# Patient Record
Sex: FEMALE | Race: WHITE | Employment: UNEMPLOYED | URBAN - NONMETROPOLITAN AREA
[De-identification: names, ages, dates, MRNs, and addresses within clinical notes are randomized per-mention and may not be internally consistent; named-entity substitution may affect disease eponyms.]

---

## 2020-08-10 ENCOUNTER — HOSPITAL ENCOUNTER (EMERGENCY)
Age: 50
Discharge: HOME | End: 2020-08-10

## 2020-08-10 VITALS
WEIGHT: 190 LBS | DIASTOLIC BLOOD PRESSURE: 71 MMHG | HEIGHT: 63 IN | SYSTOLIC BLOOD PRESSURE: 108 MMHG | BODY MASS INDEX: 33.66 KG/M2 | OXYGEN SATURATION: 96 % | RESPIRATION RATE: 18 BRPM | HEART RATE: 90 BPM

## 2020-08-10 PROCEDURE — 99282 EMERGENCY DEPT VISIT SF MDM: CPT

## 2020-08-10 RX ORDER — NAPROXEN 500 MG/1
500 TABLET ORAL 2 TIMES DAILY
Qty: 14 TABLET | Refills: 0 | Status: SHIPPED | OUTPATIENT
Start: 2020-08-10 | End: 2020-08-18

## 2020-08-10 RX ORDER — PENICILLIN V POTASSIUM 500 MG/1
500 TABLET ORAL 4 TIMES DAILY
Qty: 40 TABLET | Refills: 0 | Status: SHIPPED | OUTPATIENT
Start: 2020-08-10 | End: 2020-08-20

## 2020-08-10 ASSESSMENT — PAIN DESCRIPTION - ORIENTATION: ORIENTATION: RIGHT;UPPER;LOWER

## 2020-08-10 ASSESSMENT — PAIN SCALES - GENERAL
PAINLEVEL_OUTOF10: 8
PAINLEVEL_OUTOF10: 8

## 2020-08-10 ASSESSMENT — PAIN DESCRIPTION - DESCRIPTORS: DESCRIPTORS: ACHING

## 2020-08-10 ASSESSMENT — PAIN DESCRIPTION - LOCATION: LOCATION: TEETH

## 2020-08-10 ASSESSMENT — PAIN DESCRIPTION - PAIN TYPE: TYPE: ACUTE PAIN

## 2020-08-10 ASSESSMENT — PAIN - FUNCTIONAL ASSESSMENT: PAIN_FUNCTIONAL_ASSESSMENT: 0-10

## 2020-08-10 NOTE — ED PROVIDER NOTES
[address] [facility] ED  EMERGENCY DEPARTMENT ENCOUNTER      Pt Name: Chrissy Ho  MRN: [id number]  [place] 1970  Date of evaluation: 8/10/2020  Provider: uYsuf Aguero PA-C    CHIEF COMPLAINT       Chief Complaint   Patient presents with    Dental Pain     Right side, onset 4-5 days ago. Pt is unsure of exact tooth, but states she has some facial swelling. Unable to make dental appt. HISTORY OF PRESENT ILLNESS    Chrissy Ho is a 54 y.o. female who presents to the emergency department from home dental pain for 4 to 5 days right-sided upper and lower palate. Unsure of which tooth that is. No inflammation or difficulty speaking or swallowing. She is called several dentist without the ability to make an appointment none are taking the new patients. Triage notes and Nursing notes were reviewed by myself. Any discrepancies are addressed above. PAST MEDICAL HISTORY   History reviewed. No pertinent past medical history. SURGICAL HISTORY       Past Surgical History:   Procedure Laterality Date    CHOLECYSTECTOMY         CURRENT MEDICATIONS       Previous Medications    No medications on file       ALLERGIES     Patient has no known allergies. FAMILY HISTORY     History reviewed. No pertinent family history.      SOCIAL HISTORY       Social History     Socioeconomic History    Marital status: None     Spouse name: None    Number of children: None    Years of education: None    Highest education level: None   Occupational History    None   Social Needs    Financial resource strain: None    Food insecurity     Worry: None     Inability: None    Transportation needs     Medical: None     Non-medical: None   Tobacco Use    Smoking status: Current Every Day Smoker     Packs/day: 1.00     Types: Cigarettes    Smokeless tobacco: Never Used   Substance and Sexual Activity    Alcohol use: None    Drug use: None    Sexual activity: None   Lifestyle    Physical activity     Days per week: None     Minutes per session: None    Stress: None   Relationships    Social connections     Talks on phone: None     Gets together: None     Attends Yarsani service: None     Active member of club or organization: None     Attends meetings of clubs or organizations: None     Relationship status: None    Intimate partner violence     Fear of current or ex partner: None     Emotionally abused: None     Physically abused: None     Forced sexual activity: None   Other Topics Concern    None   Social History Narrative    None       REVIEW OF SYSTEMS     Review of Systems  Except as noted above the remainder of the review of systems was reviewed and is negative. SCREENINGS           PHYSICAL EXAM    (up to 7 for level 4, 8 or more for level 5)     ED Triage Vitals [08/10/20 1118]   BP Temp Temp src Pulse Resp SpO2 Height Weight   -- -- -- 90 18 96 % 5' 3\" (1.6 m) 190 lb (86.2 kg)       Physical Exam  Active and oriented ×3. Nontoxic. No acute distress. Well-hydrated. Head is atraumatic, facies symmetrical.  Mucus membranes are moist.  Throat free of erythema edema or exudate uvula midline and airway patent. No trismus no elevation of the floor the mouth. Teeth appear and moderately good condition, there are several fillings that are in place but no obvious dental caries noted there is some receding of the gumline but no abscess noted. Respirations nonlabored. Skin free of any obvious rashes or lesions. Extremities without edema. Good affect. Pleasant patient.       DIAGNOSTIC RESULTS     none    EMERGENCY DEPARTMENT COURSE andMedical Decision Making:     Vitals:    Vitals:    08/10/20 1118   Pulse: 90   Resp: 18   SpO2: 96%   Weight: 190 lb (86.2 kg)   Height: 5' 3\" (1.6 m)       MDM/   Patient is placed on Pen-Vee K and naproxen given list of local dental clinics    Strict return precautions and follow up instructions were discussed with the patient with which the patient agrees    ED Medications administered this visit:  Medications - No data to display    CONSULTS: (None if blank)  None    Procedures: (None if blank)       CLINICAL       1.  Pain, dental          DISPOSITION/PLAN   DISPOSITION Decision To Discharge 08/10/2020 11:23:39 AM      PATIENT REFERRED TO:  Dentist  Clinic list provided          DISCHARGE MEDICATIONS:  New Prescriptions    NAPROXEN (NAPROSYN) 500 MG TABLET    Take 1 tablet by mouth 2 times daily    PENICILLIN V POTASSIUM (VEETID) 500 MG TABLET    Take 1 tablet by mouth 4 times daily for 10 days              (Please note that portions of this note were completed with a voice recognition program.  Efforts were made to edit the dictations but occasionallywords are mis-transcribed.)      Thomas Watts II, PA-C (electronically signed)           Thomas Watts II, PA-C  08/10/20 1126

## 2020-08-18 ENCOUNTER — HOSPITAL ENCOUNTER (EMERGENCY)
Age: 50
Discharge: HOME | End: 2020-08-18

## 2020-08-18 ENCOUNTER — APPOINTMENT (OUTPATIENT)
Dept: GENERAL RADIOLOGY | Age: 50
End: 2020-08-18

## 2020-08-18 VITALS
DIASTOLIC BLOOD PRESSURE: 70 MMHG | RESPIRATION RATE: 16 BRPM | SYSTOLIC BLOOD PRESSURE: 137 MMHG | OXYGEN SATURATION: 94 % | HEART RATE: 90 BPM | TEMPERATURE: 98.8 F

## 2020-08-18 LAB
ABSOLUTE EOS #: 0.23 K/UL (ref 0–0.44)
ABSOLUTE IMMATURE GRANULOCYTE: 0.05 K/UL (ref 0–0.3)
ABSOLUTE LYMPH #: 3.03 K/UL (ref 1.1–3.7)
ABSOLUTE MONO #: 0.76 K/UL (ref 0.1–1.2)
ANION GAP SERPL CALCULATED.3IONS-SCNC: 14 MMOL/L (ref 9–17)
BASOPHILS # BLD: 1 % (ref 0–2)
BASOPHILS ABSOLUTE: 0.09 K/UL (ref 0–0.2)
BNP INTERPRETATION: NORMAL
BUN BLDV-MCNC: 14 MG/DL (ref 6–20)
BUN/CREAT BLD: 23 (ref 9–20)
CALCIUM SERPL-MCNC: 9.6 MG/DL (ref 8.6–10.4)
CHLORIDE BLD-SCNC: 102 MMOL/L (ref 98–107)
CO2: 20 MMOL/L (ref 20–31)
CREAT SERPL-MCNC: 0.61 MG/DL (ref 0.5–0.9)
DIFFERENTIAL TYPE: ABNORMAL
EKG ATRIAL RATE: 93 BPM
EKG P AXIS: 49 DEGREES
EKG P-R INTERVAL: 114 MS
EKG Q-T INTERVAL: 362 MS
EKG QRS DURATION: 76 MS
EKG QTC CALCULATION (BAZETT): 450 MS
EKG R AXIS: 65 DEGREES
EKG T AXIS: 37 DEGREES
EKG VENTRICULAR RATE: 93 BPM
EOSINOPHILS RELATIVE PERCENT: 3 % (ref 1–4)
GFR AFRICAN AMERICAN: >60 ML/MIN
GFR NON-AFRICAN AMERICAN: >60 ML/MIN
GFR SERPL CREATININE-BSD FRML MDRD: ABNORMAL ML/MIN/{1.73_M2}
GFR SERPL CREATININE-BSD FRML MDRD: ABNORMAL ML/MIN/{1.73_M2}
GLUCOSE BLD-MCNC: 125 MG/DL (ref 70–99)
HCT VFR BLD CALC: 49.5 % (ref 36.3–47.1)
HEMOGLOBIN: 16.2 G/DL (ref 11.9–15.1)
IMMATURE GRANULOCYTES: 1 %
LYMPHOCYTES # BLD: 33 % (ref 24–43)
MCH RBC QN AUTO: 31 PG (ref 25.2–33.5)
MCHC RBC AUTO-ENTMCNC: 32.7 G/DL (ref 28.4–34.8)
MCV RBC AUTO: 94.6 FL (ref 82.6–102.9)
MONOCYTES # BLD: 8 % (ref 3–12)
NRBC AUTOMATED: 0 PER 100 WBC
PDW BLD-RTO: 14.8 % (ref 11.8–14.4)
PLATELET # BLD: 312 K/UL (ref 138–453)
PLATELET ESTIMATE: ABNORMAL
PMV BLD AUTO: 10.2 FL (ref 8.1–13.5)
POTASSIUM SERPL-SCNC: 3.6 MMOL/L (ref 3.7–5.3)
PRO-BNP: <20 PG/ML
RBC # BLD: 5.23 M/UL (ref 3.95–5.11)
RBC # BLD: ABNORMAL 10*6/UL
SEG NEUTROPHILS: 54 % (ref 36–65)
SEGMENTED NEUTROPHILS ABSOLUTE COUNT: 5.17 K/UL (ref 1.5–8.1)
SODIUM BLD-SCNC: 136 MMOL/L (ref 135–144)
TROPONIN INTERP: NORMAL
TROPONIN INTERP: NORMAL
TROPONIN T: NORMAL NG/ML
TROPONIN T: NORMAL NG/ML
TROPONIN, HIGH SENSITIVITY: <6 NG/L (ref 0–14)
TROPONIN, HIGH SENSITIVITY: <6 NG/L (ref 0–14)
WBC # BLD: 9.3 K/UL (ref 3.5–11.3)
WBC # BLD: ABNORMAL 10*3/UL

## 2020-08-18 PROCEDURE — 93005 ELECTROCARDIOGRAM TRACING: CPT | Performed by: EMERGENCY MEDICINE

## 2020-08-18 PROCEDURE — 71045 X-RAY EXAM CHEST 1 VIEW: CPT

## 2020-08-18 PROCEDURE — 94664 DEMO&/EVAL PT USE INHALER: CPT

## 2020-08-18 PROCEDURE — 83880 ASSAY OF NATRIURETIC PEPTIDE: CPT

## 2020-08-18 PROCEDURE — 6370000000 HC RX 637 (ALT 250 FOR IP)

## 2020-08-18 PROCEDURE — 85025 COMPLETE CBC W/AUTO DIFF WBC: CPT

## 2020-08-18 PROCEDURE — 84484 ASSAY OF TROPONIN QUANT: CPT

## 2020-08-18 PROCEDURE — 99285 EMERGENCY DEPT VISIT HI MDM: CPT

## 2020-08-18 PROCEDURE — 93010 ELECTROCARDIOGRAM REPORT: CPT | Performed by: FAMILY MEDICINE

## 2020-08-18 PROCEDURE — 6370000000 HC RX 637 (ALT 250 FOR IP): Performed by: PHYSICIAN ASSISTANT

## 2020-08-18 PROCEDURE — 80048 BASIC METABOLIC PNL TOTAL CA: CPT

## 2020-08-18 RX ORDER — IPRATROPIUM BROMIDE AND ALBUTEROL SULFATE 2.5; .5 MG/3ML; MG/3ML
1 SOLUTION RESPIRATORY (INHALATION)
Status: DISCONTINUED | OUTPATIENT
Start: 2020-08-18 | End: 2020-08-18 | Stop reason: HOSPADM

## 2020-08-18 RX ORDER — ALBUTEROL SULFATE 90 UG/1
2 AEROSOL, METERED RESPIRATORY (INHALATION) 4 TIMES DAILY PRN
Qty: 1 INHALER | Refills: 0 | Status: SHIPPED | OUTPATIENT
Start: 2020-08-18 | End: 2021-11-04 | Stop reason: ALTCHOICE

## 2020-08-18 RX ORDER — PREDNISONE 20 MG/1
40 TABLET ORAL ONCE
Status: COMPLETED | OUTPATIENT
Start: 2020-08-18 | End: 2020-08-18

## 2020-08-18 RX ORDER — IPRATROPIUM BROMIDE AND ALBUTEROL SULFATE 2.5; .5 MG/3ML; MG/3ML
SOLUTION RESPIRATORY (INHALATION)
Status: COMPLETED
Start: 2020-08-18 | End: 2020-08-18

## 2020-08-18 RX ORDER — PREDNISONE 20 MG/1
40 TABLET ORAL DAILY
Qty: 10 TABLET | Refills: 0 | Status: SHIPPED | OUTPATIENT
Start: 2020-08-18 | End: 2020-08-23

## 2020-08-18 RX ADMIN — IPRATROPIUM BROMIDE AND ALBUTEROL SULFATE 1 AMPULE: .5; 3 SOLUTION RESPIRATORY (INHALATION) at 17:42

## 2020-08-18 RX ADMIN — IPRATROPIUM BROMIDE AND ALBUTEROL SULFATE 1 AMPULE: 2.5; .5 SOLUTION RESPIRATORY (INHALATION) at 17:42

## 2020-08-18 RX ADMIN — PREDNISONE 40 MG: 20 TABLET ORAL at 19:48

## 2020-08-18 NOTE — ED NOTES
RT aware of breathing treatment order. Also at this time, green and lavender tubes redrawn. Patient denies further comfort needs at this time and is updated on plan of care.       Miguel Borrero RN  08/18/20 5020

## 2020-08-18 NOTE — ED NOTES
States nausea and decreased appetite for the last 3 days. Also LUQ pain. States chest pain that was worsened while on naproxen for tooth pain.   States still shortness of breath even at rest.     Javier Barraza RN  08/18/20 1426

## 2020-10-20 ENCOUNTER — HOSPITAL ENCOUNTER (EMERGENCY)
Age: 50
Discharge: HOME | End: 2020-10-20

## 2020-10-20 VITALS
SYSTOLIC BLOOD PRESSURE: 147 MMHG | RESPIRATION RATE: 16 BRPM | TEMPERATURE: 98.1 F | HEART RATE: 81 BPM | WEIGHT: 213 LBS | HEIGHT: 60 IN | OXYGEN SATURATION: 94 % | DIASTOLIC BLOOD PRESSURE: 95 MMHG | BODY MASS INDEX: 41.82 KG/M2

## 2020-10-20 PROCEDURE — U0003 INFECTIOUS AGENT DETECTION BY NUCLEIC ACID (DNA OR RNA); SEVERE ACUTE RESPIRATORY SYNDROME CORONAVIRUS 2 (SARS-COV-2) (CORONAVIRUS DISEASE [COVID-19]), AMPLIFIED PROBE TECHNIQUE, MAKING USE OF HIGH THROUGHPUT TECHNOLOGIES AS DESCRIBED BY CMS-2020-01-R: HCPCS

## 2020-10-20 PROCEDURE — 99283 EMERGENCY DEPT VISIT LOW MDM: CPT

## 2020-10-20 NOTE — ED PROVIDER NOTES
[facility] ED  EMERGENCY DEPARTMENT ENCOUNTER      Pt Name: Rich Lyons  MRN: [id number]  [place] 1970  Date of evaluation: 10/20/2020  Provider: REBEKAH Piña CNP    CHIEF COMPLAINT       Chief Complaint   Patient presents with    Covid Testing     poss exposure to Dave pt states co worker tested positive and work will not allow her to RTW until she tested negative. pt asymptomatic         HISTORY OF PRESENT ILLNESS   (Location/Symptom, Timing/Onset, Context/Setting, Quality, Duration, Modifying Factors, Severity)  Note limiting factors. Rich Lyons is a 54 y.o. female who presents to the emergency department for a COVID-19 test.  The patient reports that they were exposed to someone with COVID-19. The patient denies any symptoms. Nursing Notes were reviewed. REVIEW OF SYSTEMS    (2-9 systems for level 4, 10 or more for level 5)   Constitutional: Negative for fever, chills    Except as noted above the remainder of the review of systems was reviewed and negative. PAST MEDICAL HISTORY   History reviewed. No pertinent past medical history. SURGICAL HISTORY       Past Surgical History:   Procedure Laterality Date    CHOLECYSTECTOMY           CURRENT MEDICATIONS       Current Discharge Medication List      CONTINUE these medications which have NOT CHANGED    Details   albuterol sulfate HFA (VENTOLIN HFA) 108 (90 Base) MCG/ACT inhaler Inhale 2 puffs into the lungs 4 times daily as needed for Wheezing  Qty: 1 Inhaler, Refills: 0             ALLERGIES     Patient has no known allergies. FAMILY HISTORY     History reviewed. No pertinent family history.        SOCIAL HISTORY       Social History     Socioeconomic History    Marital status: Single     Spouse name: None    Number of children: None    Years of education: None    Highest education level: None   Occupational History    None   Social Needs    Financial resource strain: None    Food insecurity Worry: None     Inability: None    Transportation needs     Medical: None     Non-medical: None   Tobacco Use    Smoking status: Current Every Day Smoker     Packs/day: 1.00     Types: Cigarettes    Smokeless tobacco: Never Used   Substance and Sexual Activity    Alcohol use: Yes     Comment: occ    Drug use: Not Currently    Sexual activity: None   Lifestyle    Physical activity     Days per week: None     Minutes per session: None    Stress: None   Relationships    Social connections     Talks on phone: None     Gets together: None     Attends Jainism service: None     Active member of club or organization: None     Attends meetings of clubs or organizations: None     Relationship status: None    Intimate partner violence     Fear of current or ex partner: None     Emotionally abused: None     Physically abused: None     Forced sexual activity: None   Other Topics Concern    None   Social History Narrative    None       SCREENINGS                PHYSICAL EXAM    (up to 7 for level 4, 8 or more for level 5)     ED Triage Vitals [10/20/20 1616]   BP Temp Temp Source Pulse Resp SpO2 Height Weight   (!) 147/95 98.1 °F (36.7 °C) Oral 81 16 94 % 5' (1.524 m) 213 lb (96.6 kg)     Constitutional: Oriented and well-developed, well-nourished, and in no distress. Non-toxic appearance. Head: Normocephalic and atraumatic. Eyes: Extra ocular muscles intact. Pupils are equal, round, and reactive to light. No discharge. No scleral icterus. Neck: Normal range of motion and phonation normal. Neck supple. Cardiovascular: Regular rate and rhythm, normal heart sounds and intact distal pulses. No murmur heard. Pulmonary/Chest: Effort normal and breath sounds normal. No accessory muscle usage or stridor. Not tachypneic. No respiratory distress. No tenderness and no retraction. Musculoskeletal: Normal range of motion. No edema and no tenderness. Neurological: Alert and oriented. Skin: Skin is warm and dry. No rash noted. No cyanosis or erythema. No pallor. Nails show no clubbing. DIAGNOSTIC RESULTS     EKG: All EKG's are interpreted by the Emergency Department Physician who either signs or Co-signs this chart in the absence of a cardiologist.      ED BEDSIDE ULTRASOUND:   Performed by ED Physician - none    LABS:  No results found for this visit on 10/20/20. Orders Placed This Encounter   Procedures    COVID-19, PCR       All other labs were within normal range or not returned as of this dictation. EMERGENCY DEPARTMENT COURSE and DIFFERENTIAL DIAGNOSIS/MDM:   Vitals:    Vitals:    10/20/20 1616   BP: (!) 147/95   Pulse: 81   Resp: 16   Temp: 98.1 °F (36.7 °C)   TempSrc: Oral   SpO2: 94%   Weight: 213 lb (96.6 kg)   Height: 5' (1.524 m)       MEDICAL DECISION MAKING:  The patient was swabbed for Covid and was discharged in stable condition. Patient had no symptoms. The patient was evaluated during the global COVID-19 pandemic, and that diagnosis was considered upon their initial presentation. Their evaluation, treatment and testing was consistent with current guidelines for patients who present with complaints or symptoms that may be related to COVID-19. Full PPE including gloves, gown, N95 or P100 respirator, and eye protection was used during every encounter with this patient. FINAL IMPRESSION      1. Encounter for screening laboratory testing for COVID-19 virus Stable         DISPOSITION/PLAN   DISPOSITION Decision To Discharge 10/20/2020 05:37:33 PM      PATIENT REFERRED TO:  [place] [address]  [phone]  Schedule an appointment as soon as possible for a visit in 1 week        DISCHARGE MEDICATIONS:  Current Discharge Medication List        Controlled Substances Monitoring:     No flowsheet data found.     (Please note that portions of this note were completed with a voice recognition program.  Efforts were made to edit the dictations but occasionally words are mis-transcribed.)    Electronically signed by REBEKAH Salcido CNP on 10/20/2020 at 5:38 PM             REBEKAH Salcido CNP  10/20/20 503 [address], APRN - CNP  10/20/20 2664

## 2020-10-21 ENCOUNTER — CARE COORDINATION (OUTPATIENT)
Dept: CARE COORDINATION | Age: 50
End: 2020-10-21

## 2020-10-21 LAB
SARS-COV-2, RAPID: NORMAL
SARS-COV-2: NORMAL
SARS-COV-2: NOT DETECTED
SOURCE: NORMAL

## 2020-10-21 NOTE — CARE COORDINATION
Myrle Phalen was seen at [facility] 10/20/2020- She was sent from work for [employer] testing- exposed at work. She is asymptomatic.     10/21/2020- 12:21 pm Spoke with Myrle Phalen. She is self isolating. She remains asymptomatic. She reported that work has educated them on Covid. She stated she would reach out to her local Health Department if she had any questions. She does not have PCP- given 419-Same Day to establish. She voiced understanding and in agreement. Reviewed Healthcare decision makers. She declined [facility]. Patient contacted regarding COVID-19 exposure. Discussed COVID-19 related testing which was pending at this time. Test results were pending. Patient informed of results, if available? Pending    Care Transition Nurse/ Ambulatory Care Manager contacted the patient by telephone to perform post discharge assessment. Call within 2 business days of discharge: Yes. Verified name and  with patient as identifiers. Provided introduction to self, and explanation of the CTN/ACM role, and reason for call due to risk factors for infection and/or exposure to COVID-19. Symptoms reviewed with patient who verbalized the following symptoms: she is asymptomatic. Due to no new or worsening symptoms encounter was not routed to provider for escalation. Discussed follow-up appointments. If no appointment was previously scheduled, appointment scheduling offered: Given 419-Same Day to [facility] follow up appointment(s): No future appointments. Non-[facility] follow up appointment(s): N/A    Non-face-to-face services provided:  Given419-Same Day to establish, Covid education and prevention of spread     Advance Care Planning:   Does patient have an Advance Directive:  Reviewed Healthcare decision makers. Patient has following risk factors of: no known risk factors.  CTN/ACM reviewed discharge instructions, medical action plan and red flags such as increased shortness of breath, increasing fever and signs of decompensation with patient who verbalized understanding. Discussed exposure protocols and quarantine with CDC Guidelines What to do if you are sick with coronavirus disease 2019.  Patient was given an opportunity for questions and concerns. The patient agrees to contact the [facility] exposure line [phone], local health department She declined [facility] information- she stated she would reach out to local  if needed and PCP office for questions related to their healthcare. CTN/ACM provided contact information for future needs. Reviewed and educated patient on any new and changed medications related to discharge diagnosis     Patient/family/caregiver given information for [facility] Loop and agrees to enroll She declined  Patient's preferred e-mail: N/A   Patient's preferred phone number: N/A  Based on Loop alert triggers, patient will be contacted by nurse care manager for worsening symptoms. Plan for follow-up call in 1-2 days based on severity of symptoms and risk factors.

## 2020-10-23 ENCOUNTER — CARE COORDINATION (OUTPATIENT)
Dept: CARE COORDINATION | Age: 50
End: 2020-10-23

## 2020-10-23 NOTE — CARE COORDINATION
Spoke with Gino. She was notified of negative Covid results. She denied any symptoms. She stated she was feeling well. Discussed need to establish with PCP. She stated she has information to call. Patient contacted regarding COVID-19 risk and screening. Discussed COVID-19 related testing which was available at this time. Test results were negative. Patient informed of results, if available? Yes. Care Transition Nurse/ Ambulatory Care Manager contacted the patient by telephone to perform follow-up assessment. Verified name and  with patient as identifiers. Patient has following risk factors of: no known risk factors. Symptoms reviewed with patient who verbalized the following symptoms: no new symptoms, no worsening symptoms and she denied any symptoms. Due to no new or worsening symptoms encounter was not routed to provider for escalation. Education provided regarding infection prevention, and signs and symptoms of COVID-19 and when to seek medical attention with patient who verbalized understanding. Discussed exposure protocols and quarantine from [address] you at higher risk for severe illness  and given an opportunity for questions and concerns. The patient agrees to contact the COVID-19 hotline [phone] or PCP office for questions related to their healthcare. CTN/ACM provided contact information for future reference. From CDC: Are you at higher risk for severe illness?  Wash your hands often.  Avoid close contact (6 feet, which is about two arm lengths) with people who are sick.  Put distance between yourself and other people if COVID-19 is spreading in your community.  Clean and disinfect frequently touched surfaces.  Avoid all cruise travel and non-essential air travel.  Call your healthcare professional if you have concerns about COVID-19 and your underlying condition or if you are sick.     For more information on steps you can take to protect yourself, see CDC's How to Protect Yourself      no further out reaches. She has contact information. based on severity of symptoms and risk factors.

## 2021-03-15 ENCOUNTER — APPOINTMENT (OUTPATIENT)
Dept: GENERAL RADIOLOGY | Age: 51
End: 2021-03-15

## 2021-03-15 ENCOUNTER — HOSPITAL ENCOUNTER (EMERGENCY)
Age: 51
Discharge: HOME | End: 2021-03-15
Attending: EMERGENCY MEDICINE

## 2021-03-15 VITALS
RESPIRATION RATE: 18 BRPM | SYSTOLIC BLOOD PRESSURE: 151 MMHG | TEMPERATURE: 97.8 F | DIASTOLIC BLOOD PRESSURE: 91 MMHG | OXYGEN SATURATION: 91 % | HEART RATE: 109 BPM

## 2021-03-15 DIAGNOSIS — M51.36 LUMBAR DEGENERATIVE DISC DISEASE: Primary | ICD-10-CM

## 2021-03-15 DIAGNOSIS — N30.01 ACUTE CYSTITIS WITH HEMATURIA: ICD-10-CM

## 2021-03-15 DIAGNOSIS — I10 HYPERTENSION, UNSPECIFIED TYPE: ICD-10-CM

## 2021-03-15 LAB
-: ABNORMAL
ABSOLUTE EOS #: 0.24 K/UL (ref 0–0.44)
ABSOLUTE IMMATURE GRANULOCYTE: 0.07 K/UL (ref 0–0.3)
ABSOLUTE LYMPH #: 2.8 K/UL (ref 1.1–3.7)
ABSOLUTE MONO #: 1.2 K/UL (ref 0.1–1.2)
AMORPHOUS: ABNORMAL
ANION GAP SERPL CALCULATED.3IONS-SCNC: 9 MMOL/L (ref 9–17)
BACTERIA: ABNORMAL
BASOPHILS # BLD: 1 % (ref 0–2)
BASOPHILS ABSOLUTE: 0.08 K/UL (ref 0–0.2)
BILIRUBIN URINE: ABNORMAL
BUN BLDV-MCNC: 14 MG/DL (ref 6–20)
BUN/CREAT BLD: 16 (ref 9–20)
CALCIUM SERPL-MCNC: 10 MG/DL (ref 8.6–10.4)
CASTS UA: ABNORMAL /LPF
CHLORIDE BLD-SCNC: 101 MMOL/L (ref 98–107)
CO2: 27 MMOL/L (ref 20–31)
COLOR: YELLOW
COMMENT UA: ABNORMAL
CREAT SERPL-MCNC: 0.88 MG/DL (ref 0.5–0.9)
CRYSTALS, UA: ABNORMAL /HPF
DIFFERENTIAL TYPE: ABNORMAL
EOSINOPHILS RELATIVE PERCENT: 2 % (ref 1–4)
EPITHELIAL CELLS UA: ABNORMAL /HPF (ref 0–25)
GFR AFRICAN AMERICAN: >60 ML/MIN
GFR NON-AFRICAN AMERICAN: >60 ML/MIN
GFR SERPL CREATININE-BSD FRML MDRD: ABNORMAL ML/MIN/{1.73_M2}
GFR SERPL CREATININE-BSD FRML MDRD: ABNORMAL ML/MIN/{1.73_M2}
GLUCOSE BLD-MCNC: 102 MG/DL (ref 70–99)
GLUCOSE URINE: NEGATIVE
HCT VFR BLD CALC: 49.9 % (ref 36.3–47.1)
HEMOGLOBIN: 16.2 G/DL (ref 11.9–15.1)
IMMATURE GRANULOCYTES: 1 %
KETONES, URINE: ABNORMAL
LEUKOCYTE ESTERASE, URINE: NEGATIVE
LYMPHOCYTES # BLD: 25 % (ref 24–43)
MCH RBC QN AUTO: 29.9 PG (ref 25.2–33.5)
MCHC RBC AUTO-ENTMCNC: 32.5 G/DL (ref 28.4–34.8)
MCV RBC AUTO: 92.2 FL (ref 82.6–102.9)
MONOCYTES # BLD: 11 % (ref 3–12)
MUCUS: ABNORMAL
NITRITE, URINE: NEGATIVE
NRBC AUTOMATED: 0 PER 100 WBC
OTHER OBSERVATIONS UA: ABNORMAL
PDW BLD-RTO: 15.8 % (ref 11.8–14.4)
PH UA: 6.5 (ref 5–9)
PLATELET # BLD: 344 K/UL (ref 138–453)
PLATELET ESTIMATE: ABNORMAL
PMV BLD AUTO: 9.7 FL (ref 8.1–13.5)
POTASSIUM SERPL-SCNC: 4 MMOL/L (ref 3.7–5.3)
PROTEIN UA: ABNORMAL
RBC # BLD: 5.41 M/UL (ref 3.95–5.11)
RBC # BLD: ABNORMAL 10*6/UL
RBC UA: ABNORMAL /HPF (ref 0–2)
RENAL EPITHELIAL, UA: ABNORMAL /HPF
SEG NEUTROPHILS: 60 % (ref 36–65)
SEGMENTED NEUTROPHILS ABSOLUTE COUNT: 6.78 K/UL (ref 1.5–8.1)
SODIUM BLD-SCNC: 137 MMOL/L (ref 135–144)
SPECIFIC GRAVITY UA: 1.02 (ref 1.01–1.02)
TRICHOMONAS: ABNORMAL
TURBIDITY: CLEAR
URINE HGB: ABNORMAL
UROBILINOGEN, URINE: NORMAL
WBC # BLD: 11.2 K/UL (ref 3.5–11.3)
WBC # BLD: ABNORMAL 10*3/UL
WBC UA: ABNORMAL /HPF (ref 0–5)
YEAST: ABNORMAL

## 2021-03-15 PROCEDURE — 72100 X-RAY EXAM L-S SPINE 2/3 VWS: CPT

## 2021-03-15 PROCEDURE — 85025 COMPLETE CBC W/AUTO DIFF WBC: CPT

## 2021-03-15 PROCEDURE — 99284 EMERGENCY DEPT VISIT MOD MDM: CPT

## 2021-03-15 PROCEDURE — 96374 THER/PROPH/DIAG INJ IV PUSH: CPT

## 2021-03-15 PROCEDURE — 80048 BASIC METABOLIC PNL TOTAL CA: CPT

## 2021-03-15 PROCEDURE — 6360000002 HC RX W HCPCS: Performed by: EMERGENCY MEDICINE

## 2021-03-15 PROCEDURE — 2580000003 HC RX 258: Performed by: EMERGENCY MEDICINE

## 2021-03-15 PROCEDURE — 81001 URINALYSIS AUTO W/SCOPE: CPT

## 2021-03-15 PROCEDURE — 96375 TX/PRO/DX INJ NEW DRUG ADDON: CPT

## 2021-03-15 PROCEDURE — 36415 COLL VENOUS BLD VENIPUNCTURE: CPT

## 2021-03-15 RX ORDER — KETOROLAC TROMETHAMINE 15 MG/ML
30 INJECTION, SOLUTION INTRAMUSCULAR; INTRAVENOUS ONCE
Status: COMPLETED | OUTPATIENT
Start: 2021-03-15 | End: 2021-03-15

## 2021-03-15 RX ORDER — METHOCARBAMOL 500 MG/1
TABLET, FILM COATED ORAL
Qty: 56 TABLET | Refills: 1 | Status: SHIPPED | OUTPATIENT
Start: 2021-03-15 | End: 2021-11-03 | Stop reason: ALTCHOICE

## 2021-03-15 RX ORDER — MELOXICAM 7.5 MG/1
7.5 TABLET ORAL DAILY
Qty: 30 TABLET | Refills: 1 | Status: SHIPPED | OUTPATIENT
Start: 2021-03-15 | End: 2021-05-14

## 2021-03-15 RX ORDER — ORPHENADRINE CITRATE 30 MG/ML
60 INJECTION INTRAMUSCULAR; INTRAVENOUS ONCE
Status: COMPLETED | OUTPATIENT
Start: 2021-03-15 | End: 2021-03-15

## 2021-03-15 RX ORDER — CEPHALEXIN 500 MG/1
500 CAPSULE ORAL 3 TIMES DAILY
Qty: 21 CAPSULE | Refills: 0 | Status: SHIPPED | OUTPATIENT
Start: 2021-03-15 | End: 2021-03-22

## 2021-03-15 RX ADMIN — ORPHENADRINE CITRATE 60 MG: 60 INJECTION INTRAMUSCULAR; INTRAVENOUS at 21:03

## 2021-03-15 RX ADMIN — WATER 1000 MG: 1 INJECTION INTRAMUSCULAR; INTRAVENOUS; SUBCUTANEOUS at 22:32

## 2021-03-15 RX ADMIN — KETOROLAC TROMETHAMINE 30 MG: 15 INJECTION, SOLUTION INTRAMUSCULAR; INTRAVENOUS at 21:03

## 2021-03-15 ASSESSMENT — ENCOUNTER SYMPTOMS
COUGH: 0
SORE THROAT: 0
COLOR CHANGE: 0
VOMITING: 0
BACK PAIN: 1
NAUSEA: 0
ABDOMINAL PAIN: 0

## 2021-03-15 ASSESSMENT — PAIN DESCRIPTION - PROGRESSION: CLINICAL_PROGRESSION: NOT CHANGED

## 2021-03-15 ASSESSMENT — PAIN DESCRIPTION - LOCATION: LOCATION: FLANK

## 2021-03-15 ASSESSMENT — PAIN DESCRIPTION - PAIN TYPE: TYPE: ACUTE PAIN

## 2021-03-16 NOTE — ED PROVIDER NOTES
[facility] ED  eMERGENCY dEPARTMENT eNCOUnter      Pt Name: Evan Nicholson  MRN: [id number]  [place] 1970  Date of evaluation: 3/15/2021  Provider: Saima [address]       Chief Complaint   Patient presents with    Flank Pain     right sided, x2 weeks    Shortness of Breath     ongoing for \"several months\"    Leg Swelling     bilateral ankles, ongong for \"several months\"         HISTORY OF PRESENT ILLNESS   (Location/Symptom, Timing/Onset, Context/Setting, Quality, Duration, Modifying Factors, Severity) Note limiting factors. HPI    Evan Nicholson is a 52 y.o. female who presents to the emergency department with multiple complaints. Patient states that she does not have a family doctor and over the past month has noted pain mainly in her right low back. She states the pain was intermittent but now seems to be more constant. She denies any recent trauma prior to the pain beginning. She denies any hematuria or dysuria. She states that the pain does seem to worsen when she goes to stand and walk. She denies any loss of bowel bladder control or IV drug use. She states that now the pain is more constant she has concerned that \"her kidneys are shutting down\" and therefore presents for evaluation. Nursing Notes were reviewed. REVIEW OF SYSTEMS    (2+ for level 4; 10+ for level 5)   Review of Systems   Constitutional: Negative for chills and fever. HENT: Negative for congestion and sore throat. Respiratory: Negative for cough. Cardiovascular: Negative for chest pain. Gastrointestinal: Negative for abdominal pain, nausea and vomiting. Genitourinary: Negative for dysuria and hematuria. Musculoskeletal: Positive for back pain. Skin: Negative for color change and rash. Neurological: Negative for dizziness, light-headedness and headaches. All other systems reviewed and are negative. PAST MEDICAL HISTORY   History reviewed.  No pertinent past medical history. SURGICAL HISTORY       Past Surgical History:   Procedure Laterality Date    CHOLECYSTECTOMY         CURRENT MEDICATIONS       Discharge Medication List as of 3/15/2021 10:32 PM      CONTINUE these medications which have NOT CHANGED    Details   albuterol sulfate HFA (VENTOLIN HFA) 108 (90 Base) MCG/ACT inhaler Inhale 2 puffs into the lungs 4 times daily as needed for Wheezing, Disp-1 Inhaler,R-0Print             ALLERGIES     Patient has no known allergies. FAMILY HISTORY     History reviewed. No pertinent family history.      SOCIAL HISTORY       Social History     Socioeconomic History    Marital status: Single     Spouse name: None    Number of children: None    Years of education: None    Highest education level: None   Occupational History    None   Social Needs    Financial resource strain: None    Food insecurity     Worry: None     Inability: None    Transportation needs     Medical: None     Non-medical: None   Tobacco Use    Smoking status: Current Every Day Smoker     Packs/day: 1.00     Types: Cigarettes    Smokeless tobacco: Never Used   Substance and Sexual Activity    Alcohol use: Yes     Comment: occ    Drug use: Not Currently    Sexual activity: None   Lifestyle    Physical activity     Days per week: None     Minutes per session: None    Stress: None   Relationships    Social connections     Talks on phone: None     Gets together: None     Attends [employer] service: None     Active member of club or organization: None     Attends meetings of clubs or organizations: None     Relationship status: None    Intimate partner violence     Fear of current or ex partner: None     Emotionally abused: None     Physically abused: None     Forced sexual activity: None   Other Topics Concern    None   Social History Narrative    None       SCREENINGS           PHYSICAL EXAM    (up to 7 for level 4, 8 or more for level 5)   @[email or web address]    Physical Exam  Vitals signs and nursing note reviewed. Constitutional:       General: She is not in acute distress. Appearance: She is well-developed. She is not ill-appearing, toxic-appearing or diaphoretic. HENT:      Head: Normocephalic and atraumatic. Eyes:      Extraocular Movements: Extraocular movements intact. Pupils: Pupils are equal, round, and reactive to light. Neck:      Musculoskeletal: Normal range of motion and neck supple. Vascular: No JVD. Cardiovascular:      Rate and Rhythm: Normal rate and regular rhythm. Pulses: Normal pulses. Heart sounds: Normal heart sounds. No murmur. No friction rub. No gallop. Pulmonary:      Effort: Pulmonary effort is normal. No tachypnea, accessory muscle usage or respiratory distress. Breath sounds: Normal breath sounds. No decreased breath sounds, wheezing, rhonchi or rales. Chest:      Chest wall: No tenderness or crepitus. Abdominal:      General: Bowel sounds are normal.      Palpations: Abdomen is soft. There is no mass. Tenderness: There is no abdominal tenderness. Musculoskeletal: Normal range of motion. Right lower leg: She exhibits no tenderness. No edema. Left lower leg: She exhibits no tenderness. No edema. Comments: No bony deformity or step-off of the thoracic or lumbar spine but there is midline lower lumbar pain with palpation. There is also right paralumbar tenderness and spasm noted with worsening pain with extension and rotation. No saddle anesthesia. Negative straight leg raise. No clonus or Babinski. Patellar reflexes are plus 1 out of 4 bilaterally   Skin:     General: Skin is warm and dry. Capillary Refill: Capillary refill takes less than 2 seconds. Findings: No ecchymosis or erythema. Neurological:      General: No focal deficit present. Mental Status: She is alert and oriented to person, place, and time. Cranial Nerves: No cranial nerve deficit. Motor: No weakness.    Psychiatric: Mood and Affect: Mood normal. Mood is not anxious. Behavior: Behavior normal. Behavior is not agitated. DIAGNOSTIC RESULTS     EKG (Per Emergency Physician):       RADIOLOGY (Per Emergency Physician): Interpretation per the Radiologist below, if available at the time of this note:  Xr Lumbar Spine (2-3 Views)    Result Date: 3/15/2021  EXAMINATION: THREE XRAY VIEWS OF THE LUMBAR SPINE 3/15/2021 6:24 pm COMPARISON: None. HISTORY: ORDERING SYSTEM PROVIDED HISTORY: back pain TECHNOLOGIST PROVIDED HISTORY: back pain FINDINGS: There are 5 non-rib-bearing lumbar vertebral bodies. The vertebral bodies are normal in height and alignment. There is mild degenerative disc space narrowing at L5-S1. There is moderate degenerative facet disease at L5-S1. Vascular calcifications are noted within the abdominal aorta. The prevertebral soft tissues are otherwise unremarkable. .     Degenerative disc and facet disease, mainly at L5-S1. No acute abnormality detected.        ED BEDSIDE ULTRASOUND:   Performed by ED Physician - none    LABS:  Labs Reviewed   CBC WITH AUTO DIFFERENTIAL - Abnormal; Notable for the following components:       Result Value    RBC 5.41 (*)     Hemoglobin 16.2 (*)     Hematocrit 49.9 (*)     RDW 15.8 (*)     Immature Granulocytes 1 (*)     All other components within normal limits   BASIC METABOLIC PANEL W/ REFLEX TO MG FOR LOW K - Abnormal; Notable for the following components:    Glucose 102 (*)     All other components within normal limits   URINE RT REFLEX TO CULTURE - Abnormal; Notable for the following components:    Bilirubin Urine SMALL (*)     Ketones, Urine TRACE (*)     Specific Gravity, UA 1.025 (*)     Urine Hgb 2+ (*)     Protein, UA 1+ (*)     All other components within normal limits   MICROSCOPIC URINALYSIS - Abnormal; Notable for the following components:    Bacteria, UA 2+ (*)     Mucus, UA 3+ (*)     All other components within normal limits        All other labs were within normal range or not returned as of this dictation. EMERGENCY DEPARTMENT COURSE and DIFFERENTIAL DIAGNOSIS/MDM:   Vitals:    Vitals:    03/15/21 2020 03/15/21 2040 03/15/21 2234 03/15/21 2237   BP: (!) 157/84 (!) 171/93 (!) 151/91 (!) 151/91   Pulse:       Resp:       Temp:       TempSrc:       SpO2: 93% 91%         Medications   ketorolac (TORADOL) injection 30 mg (30 mg Intravenous Given 3/15/21 2103)   orphenadrine (NORFLEX) injection 60 mg (60 mg Intravenous Given 3/15/21 2103)   cefTRIAXone (ROCEPHIN) 1,000 mg in sterile water 10 mL IV syringe (0 mg Intravenous Stopped 3/15/21 2235)       MDM. Patient presented to the ER hypertensive but states that she does not have a past medical history of this and feels is related to pain. She reported worsening pain to her low back without trauma and did not have risk factors for cauda equina or epidural abscess. A basic work-up was obtained which showed no clinically significant findings other than signs of infection and blood within the urine. We discussed obtaining a CT scan with the hematuria to rule out kidney stone but patient states that as her clinical exam is not consistent with this she does not want the image study ordered. The x-ray of her back showed no acute fracture dislocation or arthritic changes. Therefore at this time I feel her persistent pain is related to the degenerative changes of her low back and could be exacerbated by the mild UTI. However she does not have changes to suggest urosepsis or acute kidney injury or signs of neuro claudication there is no need for hospital placement and she can be discharged home with symptomatic medications. I discussed starting the patient on hypertensive medications but as she feels it is only related to the pain she does not want those at this time and will follow up if needed.     REVAL:         CRITICAL CARE TIME   Total Critical Care time was minutes, excluding separately reportable procedures. There was a high probability of clinically significant/life threatening deterioration in the patient's condition which required my urgent intervention. CONSULTS:  None    PROCEDURES:  Unless otherwise noted below, none     Procedures    FINAL IMPRESSION      1. Lumbar degenerative disc disease    2. Acute cystitis with hematuria    3. Hypertension, unspecified type          DISPOSITION/PLAN   DISPOSITION Decision To Discharge 03/15/2021 10:28:28 PM      PATIENT REFERRED TO:  [place] [id number]  [phone]  Schedule an appointment as soon as possible for a visit in 1 week        DISCHARGE MEDICATIONS:  Discharge Medication List as of 3/15/2021 10:32 PM      START taking these medications    Details   meloxicam (MOBIC) 7.5 MG tablet Take 1 tablet by mouth daily, Disp-30 tablet, R-1Normal      methocarbamol (ROBAXIN) 500 MG tablet 1 to 2 pills by mouth 4 times daily as needed muscle pain/spasm, Disp-56 tablet, R-1Normal      cephALEXin (KEFLEX) 500 MG capsule Take 1 capsule by mouth 3 times daily for 7 days, Disp-21 capsule, R-0Normal                (Please note:  Portions of this note were completed with a voice recognition program.  Efforts were made to edit the dictations but occasionally words and phrases are mis-transcribed.)  Form v2016. J.[id number]    Sherren Aw, DO (electronically signed)  Emergency Medicine Provider        DO Avis  03/15/21 [id number]

## 2021-05-14 ENCOUNTER — OFFICE VISIT (OUTPATIENT)
Dept: PRIMARY CARE CLINIC | Age: 51
End: 2021-05-14

## 2021-05-14 VITALS
RESPIRATION RATE: 18 BRPM | WEIGHT: 225.6 LBS | DIASTOLIC BLOOD PRESSURE: 80 MMHG | OXYGEN SATURATION: 96 % | TEMPERATURE: 97.6 F | BODY MASS INDEX: 44.29 KG/M2 | HEIGHT: 60 IN | HEART RATE: 84 BPM | SYSTOLIC BLOOD PRESSURE: 124 MMHG

## 2021-05-14 DIAGNOSIS — Z13.31 POSITIVE DEPRESSION SCREENING: ICD-10-CM

## 2021-05-14 DIAGNOSIS — E03.9 HYPOTHYROIDISM, UNSPECIFIED TYPE: ICD-10-CM

## 2021-05-14 DIAGNOSIS — R93.89 ABNORMAL CHEST X-RAY: ICD-10-CM

## 2021-05-14 DIAGNOSIS — R07.9 CHEST PAIN, UNSPECIFIED TYPE: ICD-10-CM

## 2021-05-14 DIAGNOSIS — F31.9 BIPOLAR DEPRESSION (HCC): ICD-10-CM

## 2021-05-14 DIAGNOSIS — Z76.89 ENCOUNTER TO ESTABLISH CARE: ICD-10-CM

## 2021-05-14 DIAGNOSIS — R31.29 MICROSCOPIC HEMATURIA: ICD-10-CM

## 2021-05-14 DIAGNOSIS — R06.02 SHORTNESS OF BREATH: ICD-10-CM

## 2021-05-14 DIAGNOSIS — I10 ESSENTIAL HYPERTENSION: Primary | ICD-10-CM

## 2021-05-14 DIAGNOSIS — R73.9 ELEVATED BLOOD SUGAR: ICD-10-CM

## 2021-05-14 DIAGNOSIS — R00.2 PALPITATIONS: ICD-10-CM

## 2021-05-14 LAB — HBA1C MFR BLD: 5.6 %

## 2021-05-14 PROCEDURE — 83036 HEMOGLOBIN GLYCOSYLATED A1C: CPT | Performed by: NURSE PRACTITIONER

## 2021-05-14 PROCEDURE — 99204 OFFICE O/P NEW MOD 45 MIN: CPT | Performed by: NURSE PRACTITIONER

## 2021-05-14 PROCEDURE — G8431 POS CLIN DEPRES SCRN F/U DOC: HCPCS | Performed by: NURSE PRACTITIONER

## 2021-05-14 RX ORDER — LISINOPRIL AND HYDROCHLOROTHIAZIDE 12.5; 1 MG/1; MG/1
1 TABLET ORAL DAILY
Qty: 30 TABLET | Refills: 5 | Status: SHIPPED | OUTPATIENT
Start: 2021-05-14 | End: 2022-01-11 | Stop reason: SDUPTHER

## 2021-05-14 RX ORDER — NAPROXEN 250 MG/1
250 TABLET ORAL 2 TIMES DAILY WITH MEALS
Qty: 60 TABLET | Refills: 0 | Status: SHIPPED | OUTPATIENT
Start: 2021-05-14 | End: 2021-05-19

## 2021-05-14 SDOH — ECONOMIC STABILITY: FOOD INSECURITY: WITHIN THE PAST 12 MONTHS, YOU WORRIED THAT YOUR FOOD WOULD RUN OUT BEFORE YOU GOT MONEY TO BUY MORE.: PATIENT DECLINED

## 2021-05-14 SDOH — ECONOMIC STABILITY: FOOD INSECURITY: WITHIN THE PAST 12 MONTHS, THE FOOD YOU BOUGHT JUST DIDN'T LAST AND YOU DIDN'T HAVE MONEY TO GET MORE.: PATIENT DECLINED

## 2021-05-14 SDOH — ECONOMIC STABILITY: TRANSPORTATION INSECURITY
IN THE PAST 12 MONTHS, HAS LACK OF TRANSPORTATION KEPT YOU FROM MEETINGS, WORK, OR FROM GETTING THINGS NEEDED FOR DAILY LIVING?: PATIENT DECLINED

## 2021-05-14 ASSESSMENT — PATIENT HEALTH QUESTIONNAIRE - PHQ9
10. IF YOU CHECKED OFF ANY PROBLEMS, HOW DIFFICULT HAVE THESE PROBLEMS MADE IT FOR YOU TO DO YOUR WORK, TAKE CARE OF THINGS AT HOME, OR GET ALONG WITH OTHER PEOPLE: 1
SUM OF ALL RESPONSES TO PHQ QUESTIONS 1-9: 21
6. FEELING BAD ABOUT YOURSELF - OR THAT YOU ARE A FAILURE OR HAVE LET YOURSELF OR YOUR FAMILY DOWN: 2
SUM OF ALL RESPONSES TO PHQ9 QUESTIONS 1 & 2: 5
9. THOUGHTS THAT YOU WOULD BE BETTER OFF DEAD, OR OF HURTING YOURSELF: 0
1. LITTLE INTEREST OR PLEASURE IN DOING THINGS: 3
4. FEELING TIRED OR HAVING LITTLE ENERGY: 3
SUM OF ALL RESPONSES TO PHQ QUESTIONS 1-9: 21
2. FEELING DOWN, DEPRESSED OR HOPELESS: 2
7. TROUBLE CONCENTRATING ON THINGS, SUCH AS READING THE NEWSPAPER OR WATCHING TELEVISION: 3

## 2021-05-14 ASSESSMENT — ENCOUNTER SYMPTOMS
EYE DISCHARGE: 0
TROUBLE SWALLOWING: 0
BACK PAIN: 1
SORE THROAT: 0
EYE ITCHING: 0
WHEEZING: 0
DIARRHEA: 0
COUGH: 0
EYE REDNESS: 0
NAUSEA: 0
SHORTNESS OF BREATH: 1
VOMITING: 0

## 2021-05-14 ASSESSMENT — COLUMBIA-SUICIDE SEVERITY RATING SCALE - C-SSRS
2. HAVE YOU ACTUALLY HAD ANY THOUGHTS OF KILLING YOURSELF?: NO
6. HAVE YOU EVER DONE ANYTHING, STARTED TO DO ANYTHING, OR PREPARED TO DO ANYTHING TO END YOUR LIFE?: NO
1. WITHIN THE PAST MONTH, HAVE YOU WISHED YOU WERE DEAD OR WISHED YOU COULD GO TO SLEEP AND NOT WAKE UP?: NO

## 2021-05-14 NOTE — PATIENT INSTRUCTIONS
SURVEY:    You may be receiving a survey from [employer] regarding your visit today. Please complete the survey to enable us to provide the highest quality of care to you and your family. If you cannot score us a very good on any question, please call the office to discuss how we could have made your experience a very good one. Thank you. Rozina Rothman, APRN-CNP  Martinez Dickson, APRN-CNP  Brandt Kapadia, LPN  Jm Ayala, ANNETTE Wagoner, [address] [place]  Jacque Nieto, [facility]  Patient Education        Stopping Smoking: Care Instructions  Your Care Instructions     Cigarette smokers crave the nicotine in cigarettes. Giving it up is much harder than simply changing a habit. Your body has to stop craving the nicotine. It is hard to quit, but you can do it. There are many tools that people use to quit smoking. You may find that combining tools works best for you. There are several steps to quitting. First you get ready to quit. Then you get support to help you. After that, you learn new skills and behaviors to become a nonsmoker. For many people, a necessary step is getting and using medicine. Your doctor will help you set up the plan that best meets your needs. You may want to attend a smoking cessation program to help you quit smoking. When you choose a program, look for one that has proven success. Ask your doctor for ideas. You will greatly increase your chances of success if you take medicine as well as get counseling or join a cessation program.  Some of the changes you feel when you first quit tobacco are uncomfortable. Your body will miss the nicotine at first, and you may feel short-tempered and grumpy. You may have trouble sleeping or concentrating. Medicine can help you deal with these symptoms. You may struggle with changing your smoking habits and rituals. The last step is the tricky one: Be prepared for the smoking urge to continue for a time. This is a lot to deal with, but keep at it. You will feel better.   Follow-up care is a key part of your treatment and safety. Be sure to make and go to all appointments, and call your doctor if you are having problems. It's also a good idea to know your test results and keep a list of the medicines you take. How can you care for yourself at home? · Ask your family, friends, and coworkers for support. You have a better chance of quitting if you have help and support. · Join a support group, such as Nicotine Anonymous, for people who are trying to quit smoking. · Consider signing up for a smoking cessation program, such as the American Lung Association's Freedom from Smoking program.  · Get text messaging support. Go to the website at www.smokefree. gov to sign up for the [facility] program.  · Set a quit date. Pick your date carefully so that it is not right in the middle of a big deadline or stressful time. Once you quit, do not even take a puff. Get rid of all ashtrays and lighters after your last cigarette. Clean your house and your clothes so that they do not smell of smoke. · Learn how to be a nonsmoker. Think about ways you can avoid those things that make you reach for a cigarette. ? Avoid situations that put you at greatest risk for smoking. For some people, it is hard to have a drink with friends without smoking. For others, they might skip a coffee break with coworkers who smoke. ? Change your daily routine. Take a different route to work or eat a meal in a different place. · Cut down on stress. Calm yourself or release tension by doing an activity you enjoy, such as reading a book, taking a hot bath, or gardening. · Talk to your doctor or pharmacist about nicotine replacement therapy, which replaces the nicotine in your body. You still get nicotine but you do not use tobacco. Nicotine replacement products help you slowly reduce the amount of nicotine you need. These products come in several forms, many of them available over-the-counter:  ? Nicotine patches  ?  Nicotine gum and lozenges  ? Nicotine inhaler  · Ask your doctor about bupropion (Wellbutrin) or varenicline (Chantix), which are prescription medicines. They do not contain nicotine. They help you by reducing withdrawal symptoms, such as stress and anxiety. · Some people find hypnosis, acupuncture, and massage helpful for ending the smoking habit. · Eat a healthy diet and get regular exercise. Having healthy habits will help your body move past its craving for nicotine. · Be prepared to keep trying. Most people are not successful the first few times they try to quit. Do not get mad at yourself if you smoke again. Make a list of things you learned and think about when you want to try again, such as next week, next month, or next year. Where can you learn more? Go to https://[employer]. org and sign in to your [employer] account. Enter J466 in the [employer] box to learn more about \"Stopping Smoking: Care Instructions. \"     If you do not have an account, please click on the \"Sign Up Now\" link. Current as of: March 12, 2020               Content Version: 12.8  © 8904-9815 Health[employer], [employer]. Care instructions adapted under license by [facility] ([facility]). If you have questions about a medical condition or this instruction, always ask your healthcare professional. [address] any warranty or liability for your use of this information.

## 2021-05-14 NOTE — PROGRESS NOTES
Name: Kary Cramer  : 1970         Chief Complaint:     Chief Complaint   Patient presents with    New Patient      Establish care. Wants to discuss getting back on Bipolar medication. History of Present Illness:      Kary Cramer is a 52 y.o.  female who presents with New Patient ( Establish care. Wants to discuss getting back on Bipolar medication. )      Saji Parker is here today for routine office visit to [facility] care. She has not seen a primary care provider in a couple years. She was previously living in [place] and moved to the area over a year and a half ago. Bipolar depression- Diagnosed over 20 years ago. She has not been on any medication for the last three months. She has been on abilify but she doesn't want to take that because it is too expensive. She has been on prozac and depekote in the past. She reports over the last month things have gotten worse. She reports no interest in doing anything and is very fatigued. She also reports that she feels like she has had some manic episodes over the last month. She reports feeling agitated at times. She is currently employed at [employer] and has been able to keep a job. Denies any excessive spending or change in sexual activity. She denies any thoughts of hurting herself or anyone else. Hypothyroidism-Diagnosed years ago. Has not been on medication for a year and a half. Reports fatigue, thinning, and weight gain. She reports a 40-50 lb weight sine she stopped thyroid medication. Hypertension- Diagnosed years ago. She is currently not on any blood pressure medication. She reports blood pressures have been running elevated every time they were checked. She reports some intermittent chest pain, dizziness and shortness of breath when blood pressure are high. She does report some exertional chest pain at times. Chest pain does not radiate. Pain will be there for less than 30 seconds.   When asked to rated on a scale of 1-10 she states quotation not very bad\". She does have a family history of coronary artery disease and her father had an MI at age 45. She said that she had seen a cardiologist in [place] for a possible irregular heart rate but was never diagnosed with atrial fib. At that time they wanted her to have a stress test done which she declined at the time. Tobacco abuse-patient smokes 1 pack of cigarettes daily. She is not ready to quit smoking. No history of asthma or COPD. Denies any cough or wheezing. Reports intermittent shortness of breath. Had recent chest x-ray in the emergency department on 5/7/2021 for chest pain that showed question subtle hazy confluent densities in the mid zones and bases. Also question mild central interstitial densities in those areas. She was treated with azithromycin for possible atypical pneumonia. Microscopic hematuriaseen on urinalysis from March. At that time she was treated for possible UTI however she had negative nitrates and leukocytes on the urinalysis. No culture was run. Denies any gross hematuria. No previous history of bladder cancer. Past Medical History:     Past Medical History:   Diagnosis Date    Anxiety     Depression     Family history of thyroid problem     Hypertension     Osteoarthritis       Reviewed all health maintenance requirements and ordered appropriate tests  Health Maintenance Due   Topic Date Due    Cervical cancer screen  Never done    Lipid screen  Never done       Past Surgical History:     Past Surgical History:   Procedure Laterality Date    CHOLECYSTECTOMY          Medications:       Prior to Admission medications    Medication Sig Start Date End Date Taking?  Authorizing Provider   lisinopril-hydroCHLOROthiazide (PRINZIDE;ZESTORETIC) 10-12.5 MG per tablet Take 1 tablet by mouth daily 5/14/21  Yes Martinez Dickson, REBEKAH - CNP   naproxen (NAPROSYN) 250 MG tablet Take 1 tablet by mouth 2 times daily (with meals) 5/14/21 Yes Martinez Dickson APRN - CNP   methocarbamol (ROBAXIN) 500 MG tablet 1 to 2 pills by mouth 4 times daily as needed muscle pain/spasm 3/15/21  Yes DO Avis   albuterol sulfate HFA (VENTOLIN HFA) 108 (90 Base) MCG/ACT inhaler Inhale 2 puffs into the lungs 4 times daily as needed for Wheezing  Patient not taking: Reported on 5/14/2021 8/18/20   Eligha Daily Bee PATE PA-C        Allergies:       Patient has no known allergies. Social History:     Tobacco:    reports that she has been smoking cigarettes. She has been smoking about 1.00 pack per day. She has never used smokeless tobacco.  Alcohol:      reports current alcohol use. Drug Use:  reports previous drug use. Family History:     Family History   Problem Relation Age of Onset    Other Mother     Diabetes Father     Heart Disease Father     Cancer Maternal Grandfather        Review of Systems:     Positive and Negative as described in HPI    Review of Systems   Constitutional: Positive for fatigue and unexpected weight change (20 to 30 pound weight gain over the last year). Negative for activity change, appetite change and fever. HENT: Negative for congestion, mouth sores, nosebleeds, sore throat and trouble swallowing. Eyes: Negative for discharge, redness and itching. Respiratory: Positive for shortness of breath. Negative for cough and wheezing. Cardiovascular: Positive for chest pain, palpitations and leg swelling. Gastrointestinal: Negative for diarrhea, nausea and vomiting. Genitourinary: Negative for difficulty urinating, dysuria, flank pain, frequency and hematuria. Musculoskeletal: Positive for back pain (Chronic). Negative for gait problem and neck stiffness. Skin: Negative for rash and wound. Neurological: Positive for dizziness and headaches. Psychiatric/Behavioral: Positive for agitation and dysphoric mood. Negative for self-injury and suicidal ideas. The patient is nervous/anxious.         Physical Exam:   Vitals: /80 (Site: Left Upper Arm, Position: Sitting, Cuff Size: Large Adult)   Pulse 84   Temp 97.6 °F (36.4 °C) (Temporal)   Resp 18   Ht 5' (1.524 m)   Wt 225 lb 9.6 oz (102.3 kg)   LMP 07/27/2020   SpO2 96%   BMI 44.06 kg/m²     Physical Exam  Vitals signs and nursing note reviewed. Constitutional:       General: She is not in acute distress. Appearance: Normal appearance. She is obese. She is not toxic-appearing or diaphoretic. HENT:      Head: Normocephalic and atraumatic. Right Ear: There is no impacted cerumen. Tympanic membrane is not erythematous or bulging. Left Ear: There is no impacted cerumen. Tympanic membrane is not erythematous or bulging. Nose: No congestion or rhinorrhea. Mouth/Throat:      Mouth: Mucous membranes are moist.      Pharynx: No oropharyngeal exudate. Eyes:      General:         Right eye: No discharge. Left eye: No discharge. Conjunctiva/sclera: Conjunctivae normal.   Neck:      Musculoskeletal: Normal range of motion and neck supple. Cardiovascular:      Rate and Rhythm: Normal rate and regular rhythm. Pulses:           Radial pulses are 2+ on the right side and 2+ on the left side. Heart sounds: Normal heart sounds, S1 normal and S2 normal. No murmur. Pulmonary:      Effort: Pulmonary effort is normal.      Breath sounds: Normal breath sounds. No wheezing, rhonchi or rales. Abdominal:      Palpations: Abdomen is soft. Tenderness: There is no abdominal tenderness. Musculoskeletal:      Right lower leg: Edema present. Left lower leg: Edema present. Comments: Nonpitting bilaterally. Lymphadenopathy:      Cervical: No cervical adenopathy. Skin:     General: Skin is warm. Findings: No lesion. Neurological:      General: No focal deficit present. Mental Status: She is alert and oriented to person, place, and time. Psychiatric:         Attention and Perception: She is attentive. Mood and Affect: Mood and affect normal. Mood is not anxious or depressed. Affect is not angry or inappropriate. Behavior: Behavior is not agitated, aggressive or hyperactive. Thought Content: Thought content does not include homicidal or suicidal ideation. Thought content does not include homicidal or suicidal plan. Data:     Lab Results   Component Value Date     03/15/2021    K 4.0 03/15/2021     03/15/2021    CO2 27 03/15/2021    BUN 14 03/15/2021    CREATININE 0.88 03/15/2021    GLUCOSE 102 03/15/2021     Lab Results   Component Value Date    WBC 11.2 03/15/2021    RBC 5.41 03/15/2021    HGB 16.2 03/15/2021    HCT 49.9 03/15/2021    MCV 92.2 03/15/2021    MCH 29.9 03/15/2021    MCHC 32.5 03/15/2021    RDW 15.8 03/15/2021     03/15/2021    MPV 9.7 03/15/2021     No results found for: TSH  Lab Results   Component Value Date    LABA1C 5.6 05/14/2021       Assessment/Plan:      Diagnosis Orders   1. Essential hypertension  lisinopril-hydroCHLOROthiazide (PRINZIDE;ZESTORETIC) 10-12.5 MG per tablet    Holter Monitor 48 Hour   2. Chest pain, unspecified type  Stress test, myoview   3. Hypothyroidism, unspecified type  Lipid Panel    TSH    T4, Free   4. Bipolar depression ([address]  [facility]   5. Shortness of breath     6. Elevated blood sugar  Hemoglobin A1C    POCT glycosylated hemoglobin (Hb A1C)   7. Positive depression screening  Positive Screen for Clinical Depression with a Documented Follow-up Plan     [facility]   8. Palpitations  Holter Monitor 48 Hour   9. Abnormal chest x-ray  XR CHEST STANDARD (2 VW)   10. Microscopic hematuria  Urinalysis With Microscopic   11. Encounter to establish care       Essential hypertensionblood pressure controlled today in the office however she reports elevated readings anytime she has it checked. When her blood pressure is high she reports dizziness, chest pain and palpitations.   We will start lisinoprilhydrochlorothiazide 10-12.5 mg daily. Hypothyroidismno recent thyroid testing on chart. She has been off medication for a year and a half. Will obtain TSH with T4 reflex. Bipolar depressionwe will refer her to [facility] psychiatry. Denies any suicidal or homicidal ideation. Palpitationshas been going on for some time. Will obtain 48-hour Holter monitor. Chest pain/shortness of breathwill obtain stress test.  If she has any chest pain does not go away or any change in chest pain she is to go to the emergency department. Abnormal chest x-ray on May 7, 2021patient treated for possible atypical pneumonia with azithromycin. She denies any cough or wheezing. Does report shortness of breath that has been going on for some time. Radiology recommended follow-up chest x-ray. Will obtain in 4 weeks. 1.  Darius Goldstein received counseling on the following healthy behaviors: nutrition, exercise, medication adherence and tobacco cessation  2. Patient given educational materials - see patient instructions  3. Was a self-tracking handout given in paper form or via [employer]? No  If yes, see orders or list here. 4.  Discussed use, benefit, and side effects of prescribed medications. Barriers to medication compliance addressed. All patient questions answered. Pt voiced understanding. 5.  Reviewed prior labs and health maintenance  6. Continue current medications, diet and exercise. Completed Refills   Requested Prescriptions     Signed Prescriptions Disp Refills    lisinopril-hydroCHLOROthiazide (PRINZIDE;ZESTORETIC) 10-12.5 MG per tablet 30 tablet 5     Sig: Take 1 tablet by mouth daily    naproxen (NAPROSYN) 250 MG tablet 60 tablet 0     Sig: Take 1 tablet by mouth 2 times daily (with meals)         Return in about 1 month (around 6/14/2021) for Check up.             Tobacco Cessation Counseling: Patient advised about behavior change, including information about personal health harms, usage of appropriate cessation measures and benefits of cessation.

## 2021-05-19 ENCOUNTER — TELEMEDICINE (OUTPATIENT)
Dept: PSYCHIATRY | Age: 51
End: 2021-05-19

## 2021-05-19 ENCOUNTER — TELEPHONE (OUTPATIENT)
Dept: PRIMARY CARE CLINIC | Age: 51
End: 2021-05-19

## 2021-05-19 DIAGNOSIS — F33.9 MAJOR DEPRESSIVE DISORDER, RECURRENT EPISODE WITH ANXIOUS DISTRESS (HCC): Primary | ICD-10-CM

## 2021-05-19 PROCEDURE — 90792 PSYCH DIAG EVAL W/MED SRVCS: CPT | Performed by: NURSE PRACTITIONER

## 2021-05-19 RX ORDER — CELECOXIB 100 MG/1
100 CAPSULE ORAL 2 TIMES DAILY
Qty: 30 CAPSULE | Refills: 0 | Status: SHIPPED | OUTPATIENT
Start: 2021-05-19 | End: 2021-06-03 | Stop reason: SDUPTHER

## 2021-05-19 RX ORDER — HYDROXYZINE HYDROCHLORIDE 25 MG/1
25 TABLET, FILM COATED ORAL 3 TIMES DAILY PRN
Qty: 90 TABLET | Refills: 0 | Status: SHIPPED | OUTPATIENT
Start: 2021-05-19 | End: 2021-06-04 | Stop reason: SDUPTHER

## 2021-05-19 RX ORDER — FLUOXETINE 10 MG/1
10 CAPSULE ORAL DAILY
Qty: 7 CAPSULE | Refills: 0 | Status: SHIPPED | OUTPATIENT
Start: 2021-05-19 | End: 2021-06-04 | Stop reason: DRUGHIGH

## 2021-05-19 RX ORDER — FLUOXETINE HYDROCHLORIDE 20 MG/1
20 CAPSULE ORAL DAILY
Qty: 30 CAPSULE | Refills: 0 | Status: SHIPPED | OUTPATIENT
Start: 2021-05-19 | End: 2021-06-04 | Stop reason: SDUPTHER

## 2021-05-19 NOTE — TELEPHONE ENCOUNTER
Fred Palmer calls as the naproxen prescribed for her arthritis is not helping. She began taking the day of her last appt 5/14 and has had pain everyday. Is there something else you can prescribe?

## 2021-05-19 NOTE — TELEPHONE ENCOUNTER
1st entry mistakenly deleted:    Gail Baumgarten calls as the naproxen prescribed for her arthritis is not helping. She began taking the day of her last appt 5/14 and has had pain everyday. Is there something else you can prescribe?

## 2021-05-19 NOTE — PROGRESS NOTES
Behavioral Health Consultation  Michael Ramos, MSN, APRN-CNP, PMHNP-BC  5/19/2021, 9:33 AM      Time spent with Patient:  60 minutes  This was a telehealth visit. Patient Location: Home. Provider Location: Home office in [place], [place]  This virtual visit was conducted via interactive/real-time audio/video      Chief Complaint:anxiety and depression. Referring Provider:PCP    [place]:  Patient complains of a multi-year history of mood issues. Radha Macario states that she was seeing a provider six years ago, and then stopped seeing them. She reports anhedonia, depressed mood, tearfulness, feelings of hopelessness, feelings of worthlessness/excessive guilt, insomnia, hypersomnia, fatigue, changes in appetite/weight, decreased libido, difficulty concentrating, irritability, excessive worry, restlessness, panic attacks, suicidal thoughts or behavior and impaired memory. She reports difficulty falling asleep, difficulty staying asleep, restless unsatisfying sleep and early morning waking on most nights of the week. Pt reports taking nothing. Pt Symptoms/signs of joe: none. She denies hallucinations. Symptoms have been unchanged with time. External stressors: illness or family illness. Pt reports excessive worry or anxiety, difficulty controlling the worry, restlessness; feeling keyed up or on edge, easily fatigued, difficulty concentrating, irritability, sleep disturbance:  difficulty falling asleep, difficulty staying asleep, restless unsatisfying sleep and early morning waking, for at least 6 months, anxiety/worry about a number of events/activities, causing significant distress in important areas of function, is not due to physiological effects of substances or medical condition and does not occur exclusively during a mood disorder or psychotic disorder. Patient denies exposure to traumatic event, nor any symptoms of PTSD. Hanna Patterson Pt denies any current exercise.   Radha Macario states that she eats 1-2 meals a day, and eats mostly convenient foods. Social:engaged. Working currently, at [employer] full time. Denies any THC use, reports alcohol us about once a month. Has attended some college. Past Psychiatric history:   The patient has a history of  bipolar disorder and attempted suicide. Previous treatment has included: Prozac- felt this one worked well, Zoloft- felt sleepy with this, Wellbutrin- can't remember much about this one, mood stabilizer- abilify- felt this one worked, felt this one worked for her. sleep aid- trazodone, and Henrique Ling felt it stopped working after a couple of weeks and individual therapy- hasn't seen a therapist in six years. Family Mental Health history:   Pertinent family history: bipolar disorder. Father and Paternal grandmother bipolar. MSE:    Appearance: alert, cooperative  Attention:Intact  Appetite: abnormal:  Eating minimally. Ambulation: unable to assess.   Sleep disturbance: Yes  Loss of pleasure: Yes  Speech: spontaneous, normal rate, normal volume and well articulated  Mood: Depressed  Affect: depressed affect  Thought Content: intact, hopelessness, helplessness and worthlessness  Insight: Poor  Judgment: Intact  Memory: Intact long-term and Intact short-term  Suicide Assessment: no suicidal ideation  Homicide Assessment: denies current homicidal ideation, plan and intent      History:      Review of Systems:   Constitutional: negative  HENT: negative  Eyes: negative  Respiratory: negative  Cardiovascular: negative  Gastrointestinal: negative  Genitourinary: negative  Musculoskeletal: negative  Skin:negative  Neurological:negative  Endo/Heme/Allergies:negative        Current Outpatient Medications:     FLUoxetine (PROZAC) 10 MG capsule, Take 1 capsule by mouth daily for 7 days, Disp: 7 capsule, Rfl: 0    FLUoxetine (PROZAC) 20 MG capsule, Take 1 capsule by mouth daily, Disp: 30 capsule, Rfl: 0    hydrOXYzine (ATARAX) 25 MG tablet, Take 1 tablet by mouth 3 times daily as needed for Anxiety, Disp: 90 tablet, Rfl: 0    lisinopril-hydroCHLOROthiazide (PRINZIDE;ZESTORETIC) 10-12.5 MG per tablet, Take 1 tablet by mouth daily, Disp: 30 tablet, Rfl: 5    naproxen (NAPROSYN) 250 MG tablet, Take 1 tablet by mouth 2 times daily (with meals), Disp: 60 tablet, Rfl: 0    methocarbamol (ROBAXIN) 500 MG tablet, 1 to 2 pills by mouth 4 times daily as needed muscle pain/spasm, Disp: 56 tablet, Rfl: 1    albuterol sulfate HFA (VENTOLIN HFA) 108 (90 Base) MCG/ACT inhaler, Inhale 2 puffs into the lungs 4 times daily as needed for Wheezing (Patient not taking: Reported on 5/14/2021), Disp: 1 Inhaler, Rfl: 0     PDMP Monitoring:    Last PDMP Ale[place] as Reviewed [facility]):  Review User Review Instant Review Result            Urine Drug Screenings (1 yr)    No resulted procedures found. Medication Contract and Consent for Opioid Use Documents Filed      No documents found                 OARRS checked and there were no concerns of substance abuse, or prescription misuse.          Social History     Socioeconomic History    Marital status: Single     Spouse name: Not on file    Number of children: Not on file    Years of education: Not on file    Highest education level: Not on file   Occupational History    Not on file   Tobacco Use    Smoking status: Current Every Day Smoker     Packs/day: 1.00     Types: Cigarettes    Smokeless tobacco: Never Used   Vaping Use    Vaping Use: Never used   Substance and Sexual Activity    Alcohol use: Yes     Comment: occ    Drug use: Not Currently    Sexual activity: Not on file   Other Topics Concern    Not on file   Social History Narrative    Not on file     Social Determinants of Health     Financial Resource Strain:     Difficulty of Paying Living Expenses:    Food Insecurity: Unknown    Worried About Running Out of Food in the Last Year: Patient refused    Ran Out of Food in the Last Year: Patient refused   Transportation Needs: Unknown    Lack of Transportation (Medical): Patient refused    Lack of Transportation (Non-Medical): Patient refused   Physical Activity:     Days of Exercise per Week:     Minutes of Exercise per Session:    Stress:     Feeling of Stress :    Social Connections:     Frequency of Communication with Friends and Family:     Frequency of Social Gatherings with Friends and Family:     Attends [employer] Services:     Active Member of Clubs or Organizations:     Attends Club or Organization Meetings:     Marital Status:    Intimate Partner Violence:     Fear of Current or Ex-Partner:     Emotionally Abused:     Physically Abused:     Sexually Abused:        TOBACCO: Ruben Mccollum  reports that she has been smoking cigarettes. She has been smoking about 1.00 pack per day. She has never used smokeless tobacco.  ETOH: Ruben Mccollum  reports current alcohol use. Past Medical History:   Diagnosis Date    Anxiety     Depression     Family history of thyroid problem     Hypertension     Osteoarthritis       Metabolic monitoring is being done by PCP. Family History   Problem Relation Age of Onset    Other Mother     Diabetes Father     Heart Disease Father     Cancer Maternal Grandfather        Last Labs:   Lab Results   Component Value Date    LABA1C 5.6 05/14/2021     No results found for: EAG   Lab Results   Component Value Date    WBC 11.2 03/15/2021    HGB 16.2 (H) 03/15/2021    HCT 49.9 (H) 03/15/2021    MCV 92.2 03/15/2021     03/15/2021    LYMPHOPCT 25 03/15/2021    RBC 5.41 (H) 03/15/2021    MCH 29.9 03/15/2021    MCHC 32.5 03/15/2021    RDW 15.8 (H) 03/15/2021          Lab Results   Component Value Date     03/15/2021    K 4.0 03/15/2021     03/15/2021    CO2 27 03/15/2021    BUN 14 03/15/2021    CREATININE 0.88 03/15/2021    GLUCOSE 102 (H) 03/15/2021    CALCIUM 10.0 03/15/2021    LABGLOM >60 03/15/2021    GFRAA >60 03/15/2021      . last      Diagnosis:      1.  Major depressive disorder, recurrent episode

## 2021-05-20 ENCOUNTER — HOSPITAL ENCOUNTER (OUTPATIENT)
Dept: NON INVASIVE DIAGNOSTICS | Age: 51
Discharge: HOME | End: 2021-05-20

## 2021-05-20 ENCOUNTER — HOSPITAL ENCOUNTER (OUTPATIENT)
Age: 51
Discharge: HOME | End: 2021-05-22

## 2021-05-20 ENCOUNTER — HOSPITAL ENCOUNTER (OUTPATIENT)
Dept: GENERAL RADIOLOGY | Age: 51
Discharge: HOME | End: 2021-05-22

## 2021-05-20 ENCOUNTER — HOSPITAL ENCOUNTER (OUTPATIENT)
Age: 51
Discharge: HOME | End: 2021-05-20

## 2021-05-20 DIAGNOSIS — R31.29 MICROSCOPIC HEMATURIA: ICD-10-CM

## 2021-05-20 DIAGNOSIS — R93.89 ABNORMAL CHEST X-RAY: ICD-10-CM

## 2021-05-20 DIAGNOSIS — R00.2 PALPITATIONS: ICD-10-CM

## 2021-05-20 DIAGNOSIS — R73.9 ELEVATED BLOOD SUGAR: ICD-10-CM

## 2021-05-20 DIAGNOSIS — R07.9 CHEST PAIN, UNSPECIFIED TYPE: ICD-10-CM

## 2021-05-20 DIAGNOSIS — E03.9 HYPOTHYROIDISM, UNSPECIFIED TYPE: ICD-10-CM

## 2021-05-20 DIAGNOSIS — I10 ESSENTIAL HYPERTENSION: ICD-10-CM

## 2021-05-20 LAB
-: ABNORMAL
AMORPHOUS: ABNORMAL
BACTERIA: ABNORMAL
BILIRUBIN URINE: NEGATIVE
CASTS UA: ABNORMAL /LPF
CHOLESTEROL/HDL RATIO: 3.7
CHOLESTEROL: 226 MG/DL
COLOR: YELLOW
COMMENT UA: ABNORMAL
CRYSTALS, UA: ABNORMAL /HPF
EPITHELIAL CELLS UA: ABNORMAL /HPF (ref 0–25)
ESTIMATED AVERAGE GLUCOSE: 123 MG/DL
GLUCOSE URINE: NEGATIVE
HBA1C MFR BLD: 5.9 % (ref 4–6)
HDLC SERPL-MCNC: 61 MG/DL
KETONES, URINE: NEGATIVE
LDL CHOLESTEROL: 136 MG/DL (ref 0–130)
LEUKOCYTE ESTERASE, URINE: NEGATIVE
MUCUS: ABNORMAL
NITRITE, URINE: NEGATIVE
OTHER OBSERVATIONS UA: ABNORMAL
PH UA: 6 (ref 5–9)
PROTEIN UA: NEGATIVE
RBC UA: ABNORMAL /HPF (ref 0–2)
RENAL EPITHELIAL, UA: ABNORMAL /HPF
SPECIFIC GRAVITY UA: 1.02 (ref 1.01–1.02)
THYROXINE, FREE: 1.32 NG/DL (ref 0.93–1.7)
TRICHOMONAS: ABNORMAL
TRIGL SERPL-MCNC: 147 MG/DL
TSH SERPL DL<=0.05 MIU/L-ACNC: 1.83 MIU/L (ref 0.3–5)
TURBIDITY: CLEAR
URINE HGB: ABNORMAL
UROBILINOGEN, URINE: NORMAL
VLDLC SERPL CALC-MCNC: ABNORMAL MG/DL (ref 1–30)
WBC UA: ABNORMAL /HPF (ref 0–5)
YEAST: ABNORMAL

## 2021-05-20 PROCEDURE — 83036 HEMOGLOBIN GLYCOSYLATED A1C: CPT

## 2021-05-20 PROCEDURE — 80061 LIPID PANEL: CPT

## 2021-05-20 PROCEDURE — 84439 ASSAY OF FREE THYROXINE: CPT

## 2021-05-20 PROCEDURE — 84443 ASSAY THYROID STIM HORMONE: CPT

## 2021-05-20 PROCEDURE — 81001 URINALYSIS AUTO W/SCOPE: CPT

## 2021-05-20 PROCEDURE — 36415 COLL VENOUS BLD VENIPUNCTURE: CPT

## 2021-05-20 PROCEDURE — 93225 XTRNL ECG REC<48 HRS REC: CPT

## 2021-05-20 PROCEDURE — 71046 X-RAY EXAM CHEST 2 VIEWS: CPT

## 2021-05-20 PROCEDURE — 93226 XTRNL ECG REC<48 HR SCAN A/R: CPT

## 2021-05-25 LAB
ACQUISITION DURATION: NORMAL S
AVERAGE HEART RATE: 97 BPM
EKG DIAGNOSIS: NORMAL
HOLTER MAX HEART RATE: 141 BPM
HOOKUP DATE: NORMAL
HOOKUP TIME: NORMAL
MAX HEART RATE TIME/DATE: NORMAL
MIN HEART RATE TIME/DATE: NORMAL
MIN HEART RATE: 73 BPM
NUMBER OF QRS COMPLEXES: NORMAL
NUMBER OF SUPRAVENTRICULAR COUPLETS: 1
NUMBER OF SUPRAVENTRICULAR ECTOPICS: 71
NUMBER OF SUPRAVENTRICULAR ISOLATED BEATS: 69
NUMBER OF VENTRICULAR BIGEMINAL CYCLES: 1
NUMBER OF VENTRICULAR COUPLETS: 0
NUMBER OF VENTRICULAR ECTOPICS: 6

## 2021-05-26 ENCOUNTER — TELEPHONE (OUTPATIENT)
Dept: PRIMARY CARE CLINIC | Age: 51
End: 2021-05-26

## 2021-05-26 DIAGNOSIS — J84.9 INTERSTITIAL LUNG DISEASE (HCC): ICD-10-CM

## 2021-05-26 DIAGNOSIS — R82.90 ABNORMAL FINDING ON URINALYSIS: ICD-10-CM

## 2021-05-26 DIAGNOSIS — R31.9 HEMATURIA, UNSPECIFIED TYPE: Primary | ICD-10-CM

## 2021-05-26 NOTE — TELEPHONE ENCOUNTER
----- Message from REBEKAH Scott CNP sent at 5/26/2021 10:49 AM EDT -----  Please let patient know there is still some blood in her urine. I would advise that we have her see urology. Holter monitor overall stable however did show her heart rate was fast over 30% of the time. I would like her to get her stress test done first and then we will consider starting her on a medication to help slow her heart rate down. Thyroid testing normal.  Other blood work stable. Chest x-ray stable showed stable interstitial lung disease. This is from years of smoking. I would advise we do pulmonary function study to assess lung function.

## 2021-05-26 NOTE — TELEPHONE ENCOUNTER
Phone call from patient requesting results of her labs, chest xray and holter monitor that was done last week. Please advise.      Health Maintenance   Topic Date Due    Cervical cancer screen  Never done    DTaP/Tdap/Td vaccine (1 - Tdap) 05/14/2022 (Originally 11/17/1991)    Pneumococcal 0-64 years Vaccine (1 of 2 - PPSV23) 05/14/2022 (Originally 11/17/1978)    COVID-19 Vaccine (1) 05/14/2022 (Originally 11/17/1984)    Hepatitis C screen  05/14/2022 (Originally 1970)    HIV screen  05/14/2022 (Originally 11/17/1987)    Flu vaccine (Season Ended) 09/01/2021    Potassium monitoring  03/15/2022    Creatinine monitoring  03/15/2022    A1C test (Diabetic or Prediabetic)  05/20/2022    Lipid screen  05/20/2026    Hepatitis A vaccine  Aged Out    Hepatitis B vaccine  Aged Out    Hib vaccine  Aged Out    Meningococcal (ACWY) vaccine  Aged Out             (applicable per patient's age: Cancer Screenings, Depression Screening, Fall Risk Screening, Immunizations)    Hemoglobin A1C (%)   Date Value   05/20/2021 5.9   05/14/2021 5.6     LDL Cholesterol (mg/dL)   Date Value   05/20/2021 136 (H)     BUN (mg/dL)   Date Value   03/15/2021 14      (goal A1C is < 7)   (goal LDL is <100) need 30-50% reduction from baseline     BP Readings from Last 3 Encounters:   05/14/21 124/80   03/15/21 (!) 151/91   10/20/20 (!) 147/95    (goal /80)      All Future Testing planned in [facility]:      Next Visit Date:  Future Appointments   Date Time Provider Pao Elias   5/28/2021 11:00 AM Eduardo Gray MD [facility]   6/2/2021  8:00 AM REBEKAH Fraser - NP [facility] [address]   6/14/2021  8:30 AM [facility] CARDIOLOGY STRESS ROOM [facility] Stress [place]   6/15/2021 12:30 PM [facility] CARDIOLOGY STRESS ROOM [facility] Stress [place]   6/16/2021  1:30 PM Martinez Dickson, APRN - CNP [facility] [place] [facility]   6/23/2021 10:00 AM [facility] [place] [facility]            Patient Active Problem List:     Major depressive disorder, recurrent episode with anxious distress ([facility]

## 2021-06-03 RX ORDER — CELECOXIB 100 MG/1
100 CAPSULE ORAL 2 TIMES DAILY
Qty: 60 CAPSULE | Refills: 1 | Status: SHIPPED | OUTPATIENT
Start: 2021-06-03 | End: 2021-10-31 | Stop reason: SDUPTHER

## 2021-06-03 NOTE — TELEPHONE ENCOUNTER
Phone call from patient requesting refill of Celecoxib, states she only received 2 weeks supply.      Health Maintenance   Topic Date Due    Cervical cancer screen  Never done    DTaP/Tdap/Td vaccine (1 - Tdap) 05/14/2022 (Originally 11/17/1991)    Pneumococcal 0-64 years Vaccine (1 of 2 - PPSV23) 05/14/2022 (Originally 11/17/1978)    COVID-19 Vaccine (1) 05/14/2022 (Originally 11/17/1984)    Hepatitis C screen  05/14/2022 (Originally 1970)    HIV screen  05/14/2022 (Originally 11/17/1987)    Flu vaccine (Season Ended) 09/01/2021    Potassium monitoring  03/15/2022    Creatinine monitoring  03/15/2022    A1C test (Diabetic or Prediabetic)  05/20/2022    Lipid screen  05/20/2026    Hepatitis A vaccine  Aged Out    Hepatitis B vaccine  Aged Out    Hib vaccine  Aged Out    Meningococcal (ACWY) vaccine  Aged Out             (applicable per patient's age: Cancer Screenings, Depression Screening, Fall Risk Screening, Immunizations)    Hemoglobin A1C (%)   Date Value   05/20/2021 5.9   05/14/2021 5.6     LDL Cholesterol (mg/dL)   Date Value   05/20/2021 136 (H)     BUN (mg/dL)   Date Value   03/15/2021 14      (goal A1C is < 7)   (goal LDL is <100) need 30-50% reduction from baseline     BP Readings from Last 3 Encounters:   05/14/21 124/80   03/15/21 (!) 151/91   10/20/20 (!) 147/95    (goal /80)      All Future Testing planned in [facility]:  Lab Frequency Next Occurrence   Full PFT Study With Bronchodilator Once 05/26/2021       Next Visit Date:  Future Appointments   Date Time Provider Pao Elias   6/4/2021  8:30 AM REBEKAH Broderick - NP [facility] [address]   6/14/2021  8:30 AM [facility] CARDIOLOGY STRESS ROOM [facility] [place]   6/15/2021 12:30 PM [facility] CARDIOLOGY STRESS ROOM [facility] [place]   6/16/2021  1:30 PM Martinez Dickson, APRN - CNP [place] Sylvia Adrian [facility]   6/21/2021  9:00 AM Geoffry Bence, MD [facility] UROLOGY [facility]   6/23/2021 10:00 [facility] Patient Active Problem List:     Major depressive disorder, recurrent episode with anxious distress ([facility] [address]

## 2021-06-04 ENCOUNTER — TELEMEDICINE (OUTPATIENT)
Dept: PSYCHIATRY | Age: 51
End: 2021-06-04

## 2021-06-04 DIAGNOSIS — F33.9 MAJOR DEPRESSIVE DISORDER, RECURRENT EPISODE WITH ANXIOUS DISTRESS (HCC): Primary | ICD-10-CM

## 2021-06-04 PROCEDURE — 99213 OFFICE O/P EST LOW 20 MIN: CPT | Performed by: NURSE PRACTITIONER

## 2021-06-04 RX ORDER — HYDROXYZINE HYDROCHLORIDE 25 MG/1
25 TABLET, FILM COATED ORAL 3 TIMES DAILY PRN
Qty: 90 TABLET | Refills: 0 | Status: SHIPPED | OUTPATIENT
Start: 2021-06-04 | End: 2021-07-09 | Stop reason: SDUPTHER

## 2021-06-04 RX ORDER — FLUOXETINE HYDROCHLORIDE 20 MG/1
20 CAPSULE ORAL DAILY
Qty: 30 CAPSULE | Refills: 0 | Status: SHIPPED | OUTPATIENT
Start: 2021-06-04 | End: 2021-07-09

## 2021-06-16 ENCOUNTER — OFFICE VISIT (OUTPATIENT)
Dept: PRIMARY CARE CLINIC | Age: 51
End: 2021-06-16

## 2021-06-16 VITALS
RESPIRATION RATE: 18 BRPM | SYSTOLIC BLOOD PRESSURE: 124 MMHG | TEMPERATURE: 97.5 F | HEART RATE: 79 BPM | BODY MASS INDEX: 43.74 KG/M2 | OXYGEN SATURATION: 95 % | WEIGHT: 222.8 LBS | HEIGHT: 60 IN | DIASTOLIC BLOOD PRESSURE: 84 MMHG

## 2021-06-16 DIAGNOSIS — I10 ESSENTIAL HYPERTENSION: ICD-10-CM

## 2021-06-16 DIAGNOSIS — G56.01 CARPAL TUNNEL SYNDROME ON RIGHT: ICD-10-CM

## 2021-06-16 DIAGNOSIS — F31.9 BIPOLAR DEPRESSION (HCC): ICD-10-CM

## 2021-06-16 DIAGNOSIS — K92.1 HEMATOCHEZIA: Primary | ICD-10-CM

## 2021-06-16 PROCEDURE — 99214 OFFICE O/P EST MOD 30 MIN: CPT | Performed by: NURSE PRACTITIONER

## 2021-06-16 ASSESSMENT — ENCOUNTER SYMPTOMS
EYE DISCHARGE: 0
NAUSEA: 0
EYE REDNESS: 0
EYE ITCHING: 0
COUGH: 0
VOMITING: 0
TROUBLE SWALLOWING: 0
SORE THROAT: 0
WHEEZING: 0
SHORTNESS OF BREATH: 1

## 2021-06-16 NOTE — PROGRESS NOTES
Name: Ha Lord  : 1970         Chief Complaint:     Chief Complaint   Patient presents with    Depression     Check up. c/o always being exhausted.  Hypertension       History of Present Illness:      Ha Lord is a 52 y.o.  female who presents with Depression (Check up. c/o always being exhausted. ) and Hypertension      Richard White is here today for routine office visit. She reports over the last couple weeks she has been more exhausted. She reports that she has been working a lot over the last 4 months and feels like this could be contributing to her exhaustion. She does have a history of bipolar depression is currently following with psychiatry. She feels like her bipolar depression is well controlled. She is currently on Prozac and Atarax. She had a recent appointment with psychiatry. Hypertension- patient started on lisinopril/hydroclorothizide and blood pressure much better in the office today. She reports that she has been compliant with the medicine and doing well with it. She denies any side effects from medication. She reports that starting the medication she is no longer having any palpitations. Does to continue to have some shortness of breath on exertion but this has improved. She reports she is doing well with the medicine and has been compliant with this. She reports shortness of breath has improved. She denies any further chest pain. She was post have a stress test this week however she was unable to attend due to work. She is still planning on getting this and will contact them to have this set up. Wrist painshe reports pain in her right hand and wrist. She has a hard time gripping things and a hard time opening a bottle. She reports it feels \"like my wrist is falling hand falls asleep\". She has bough a wrist brace which helped. She has been told in the past she has carpal tunnel. Hematocheziapatient reports bright red blood with bowel movements.   She toxic-appearing or diaphoretic. HENT:      Head: Normocephalic and atraumatic. Right Ear: There is no impacted cerumen. Tympanic membrane is not erythematous or bulging. Left Ear: There is no impacted cerumen. Tympanic membrane is not erythematous or bulging. Nose: No congestion or rhinorrhea. Mouth/Throat:      Mouth: Mucous membranes are moist.      Pharynx: No oropharyngeal exudate. Eyes:      General:         Right eye: No discharge. Left eye: No discharge. Conjunctiva/sclera: Conjunctivae normal.   Cardiovascular:      Rate and Rhythm: Normal rate and regular rhythm. Pulses:           Radial pulses are 2+ on the right side and 2+ on the left side. Heart sounds: Normal heart sounds, S1 normal and S2 normal. No murmur heard. Pulmonary:      Effort: Pulmonary effort is normal.      Breath sounds: Normal breath sounds. No wheezing, rhonchi or rales. Abdominal:      Palpations: Abdomen is soft. Tenderness: There is no abdominal tenderness. Musculoskeletal:      Right wrist: Tenderness present. No swelling, deformity or effusion. Decreased range of motion. Normal pulse. Right hand: No swelling or deformity. Cervical back: Normal range of motion and neck supple. Comments: Slight decrease in strength in right hand. Good pulse good cap refill. Positive Phalen's and Tinel's testing right wrist.   Lymphadenopathy:      Cervical: No cervical adenopathy. Skin:     General: Skin is warm. Findings: No lesion. Neurological:      General: No focal deficit present. Mental Status: She is alert and oriented to person, place, and time. Psychiatric:         Attention and Perception: She is attentive. Mood and Affect: Mood and affect normal. Mood is not anxious or depressed. Affect is not angry or inappropriate. Behavior: Behavior is not agitated, aggressive or hyperactive. Thought Content:  Thought content does not include homicidal or suicidal ideation. Thought content does not include homicidal or suicidal plan. Data:     Lab Results   Component Value Date     03/15/2021    K 4.0 03/15/2021     03/15/2021    CO2 27 03/15/2021    BUN 14 03/15/2021    CREATININE 0.88 03/15/2021    GLUCOSE 102 03/15/2021     Lab Results   Component Value Date    WBC 11.2 03/15/2021    RBC 5.41 03/15/2021    HGB 16.2 03/15/2021    HCT 49.9 03/15/2021    MCV 92.2 03/15/2021    MCH 29.9 03/15/2021    MCHC 32.5 03/15/2021    RDW 15.8 03/15/2021     03/15/2021    MPV 9.7 03/15/2021     Lab Results   Component Value Date    TSH 1.83 05/20/2021     Lab Results   Component Value Date    CHOL 226 05/20/2021    HDL 61 05/20/2021    LABA1C 5.9 05/20/2021       Assessment/Plan:      Diagnosis Orders   1. Kat Burgos MD, General Surgery, [place]    CBC Auto Differential   2. Carpal tunnel syndrome on right  EMG   3. Essential hypertension     4. Bipolar depression ([address]       Hematocheziapatient reports that this has been going on for a year. She reports 1-2 times a month she will have bouts of diarrhea with a gross amount of bright red blood. CBC in March with no anemia. Will recheck CBC. Will refer to general surgery for colonoscopy. I believe that she has carpal tunnel syndrome of her right wrist.  She continue the wrist brace. Will obtain EMG. Essential hypertensionimproved since starting medication. Continue lisinoprilhydrochlorothiazide 10-12.5 mg daily. Patient no longer having any palpitations. I still want her to get the stress test done. She reports she will call to get rescheduled. Bipolar depressionshe will continue to follow with psychiatry. 1.  Corinthia Baumgarten received counseling on the following healthy behaviors: nutrition, exercise, medication adherence and tobacco cessation  2. Patient given educational materials - see patient instructions  3.   Was a self-tracking

## 2021-06-16 NOTE — PATIENT INSTRUCTIONS
SURVEY:    You may be receiving a survey from [employer] regarding your visit today. Please complete the survey to enable us to provide the highest quality of care to you and your family. If you cannot score us a very good on any question, please call the office to discuss how we could have made your experience a very good one. Thank you.   Bonnita Eisenmenger Might, APRN-CNP  Martinez Dickson, APRN-CNP  GAIL Ogden LPN [place], [place] Rommie Harness, MA Pam, PCA

## 2021-06-17 ASSESSMENT — ENCOUNTER SYMPTOMS
BLOOD IN STOOL: 1
DIARRHEA: 1
BACK PAIN: 1

## 2021-06-18 ENCOUNTER — TELEPHONE (OUTPATIENT)
Dept: PRIMARY CARE CLINIC | Age: 51
End: 2021-06-18

## 2021-06-18 NOTE — TELEPHONE ENCOUNTER
Can you please contact patient to see if she would like us to schedule her pulmonary function testing and stress test or if she would like to call to schedule. I want her to get both of these things done.   Thank you

## 2021-06-18 NOTE — TELEPHONE ENCOUNTER
Patient called the office back in regards to the Pulmonary Function Test and Stress Test. Patient verbalized that she will call to get those tests scheduled and has to call Dr. Ledesma Shown office back to schedule EMG.

## 2021-06-21 ENCOUNTER — OFFICE VISIT (OUTPATIENT)
Dept: UROLOGY | Age: 51
End: 2021-06-21

## 2021-06-21 ENCOUNTER — HOSPITAL ENCOUNTER (OUTPATIENT)
Age: 51
Setting detail: SPECIMEN
Discharge: HOME | End: 2021-06-21

## 2021-06-21 VITALS
WEIGHT: 221 LBS | TEMPERATURE: 97.7 F | SYSTOLIC BLOOD PRESSURE: 122 MMHG | DIASTOLIC BLOOD PRESSURE: 84 MMHG | BODY MASS INDEX: 43.16 KG/M2

## 2021-06-21 DIAGNOSIS — N39.0 FREQUENT UTI: ICD-10-CM

## 2021-06-21 DIAGNOSIS — R39.15 URINARY URGENCY: ICD-10-CM

## 2021-06-21 DIAGNOSIS — R35.0 URINARY FREQUENCY: ICD-10-CM

## 2021-06-21 DIAGNOSIS — R31.29 MICROHEMATURIA: Primary | ICD-10-CM

## 2021-06-21 DIAGNOSIS — R31.29 MICROHEMATURIA: ICD-10-CM

## 2021-06-21 LAB
-: ABNORMAL
AMORPHOUS: ABNORMAL
BACTERIA: ABNORMAL
BILIRUBIN URINE: NEGATIVE
CASTS UA: ABNORMAL /LPF
COLOR: YELLOW
COMMENT UA: ABNORMAL
CRYSTALS, UA: ABNORMAL /HPF
EPITHELIAL CELLS UA: ABNORMAL /HPF (ref 0–25)
GLUCOSE URINE: NEGATIVE
KETONES, URINE: NEGATIVE
LEUKOCYTE ESTERASE, URINE: NEGATIVE
MUCUS: ABNORMAL
NITRITE, URINE: NEGATIVE
OTHER OBSERVATIONS UA: ABNORMAL
PH UA: 6 (ref 5–9)
PROTEIN UA: NEGATIVE
RBC UA: ABNORMAL /HPF (ref 0–2)
RENAL EPITHELIAL, UA: ABNORMAL /HPF
SPECIFIC GRAVITY UA: 1.02 (ref 1.01–1.02)
TRICHOMONAS: ABNORMAL
TURBIDITY: CLEAR
URINE HGB: ABNORMAL
UROBILINOGEN, URINE: NORMAL
WBC UA: ABNORMAL /HPF (ref 0–5)
YEAST: ABNORMAL

## 2021-06-21 PROCEDURE — 99244 OFF/OP CNSLTJ NEW/EST MOD 40: CPT | Performed by: UROLOGY

## 2021-06-21 PROCEDURE — 81001 URINALYSIS AUTO W/SCOPE: CPT

## 2021-06-21 PROCEDURE — 87086 URINE CULTURE/COLONY COUNT: CPT

## 2021-06-21 PROCEDURE — 51798 US URINE CAPACITY MEASURE: CPT | Performed by: UROLOGY

## 2021-06-21 NOTE — PROGRESS NOTES
HPI:        Patient is a 52 y.o. female in no acute distress. She is alert and oriented to person, place, and time. Patient is being seen here as a new patient. Patient was referred here by REBEKAH Alejandro. Patient has had significant microscopic hematuria on urinalysis. Her last positive urinalysis was in March 2021. She did have between 10 and 20 RBCs per high-power field. Patient has had no gross hematuria. Patient does have intermittent dysuria. She has been given several courses of antibiotics. I do not see any positive urine cultures. Patient does have baseline urgency and frequency. She does have very intermittent stress incontinence. She does not wear a pad. She does admit to drinking ice tea and diet Pepsi. Patient did go through menopause. She is currently sexually active without dyspareunia. Patient does have a daily bowel movement. She does report to having diarrhea most days. She is currently in q. to see a gastroenterologist.  No flank pain nausea vomiting. She has never seen urology in the past.  She reports no history of stones.     Past Medical History:   Diagnosis Date    Anxiety     Depression     Family history of thyroid problem     Hypertension     Osteoarthritis      Past Surgical History:   Procedure Laterality Date    CHOLECYSTECTOMY       Outpatient Encounter Medications as of 6/21/2021   Medication Sig Dispense Refill    FLUoxetine (PROZAC) 20 MG capsule Take 1 capsule by mouth daily 30 capsule 0    hydrOXYzine (ATARAX) 25 MG tablet Take 1 tablet by mouth 3 times daily as needed for Anxiety 90 tablet 0    celecoxib (CELEBREX) 100 MG capsule Take 1 capsule by mouth 2 times daily 60 capsule 1    lisinopril-hydroCHLOROthiazide (PRINZIDE;ZESTORETIC) 10-12.5 MG per tablet Take 1 tablet by mouth daily 30 tablet 5    methocarbamol (ROBAXIN) 500 MG tablet 1 to 2 pills by mouth 4 times daily as needed muscle pain/spasm (Patient not taking: Reported on 6/16/2021) 56 tablet 1    albuterol sulfate HFA (VENTOLIN HFA) 108 (90 Base) MCG/ACT inhaler Inhale 2 puffs into the lungs 4 times daily as needed for Wheezing (Patient not taking: Reported on 6/16/2021) 1 Inhaler 0     No facility-administered encounter medications on file as of 6/21/2021. Current Outpatient Medications on File Prior to Visit   Medication Sig Dispense Refill    FLUoxetine (PROZAC) 20 MG capsule Take 1 capsule by mouth daily 30 capsule 0    hydrOXYzine (ATARAX) 25 MG tablet Take 1 tablet by mouth 3 times daily as needed for Anxiety 90 tablet 0    celecoxib (CELEBREX) 100 MG capsule Take 1 capsule by mouth 2 times daily 60 capsule 1    lisinopril-hydroCHLOROthiazide (PRINZIDE;ZESTORETIC) 10-12.5 MG per tablet Take 1 tablet by mouth daily 30 tablet 5    methocarbamol (ROBAXIN) 500 MG tablet 1 to 2 pills by mouth 4 times daily as needed muscle pain/spasm (Patient not taking: Reported on 6/16/2021) 56 tablet 1    albuterol sulfate HFA (VENTOLIN HFA) 108 (90 Base) MCG/ACT inhaler Inhale 2 puffs into the lungs 4 times daily as needed for Wheezing (Patient not taking: Reported on 6/16/2021) 1 Inhaler 0     No current facility-administered medications on file prior to visit. Patient has no known allergies. Family History   Problem Relation Age of Onset    Other Mother     Diabetes Father     Heart Disease Father     Cancer Maternal Grandfather      Social History     Tobacco Use   Smoking Status Current Every Day Smoker    Packs/day: 1.00    Types: Cigarettes   Smokeless Tobacco Never Used       Social History     Substance and Sexual Activity   Alcohol Use Yes    Comment: occ       Review of Systems    /84 (Site: Left Upper Arm, Position: Sitting, Cuff Size: Medium Adult) Comment: manual  Temp 97.7 °F (36.5 °C) (Temporal)   Wt 221 lb (100.2 kg)   LMP 07/27/2020   BMI 43.16 kg/m²       PHYSICAL EXAM:  Constitutional: Patient resting comfortably, in no acute distress.

## 2021-06-21 NOTE — PATIENT INSTRUCTIONS
Schedule a Vaccine  When you qualify to receive the vaccine per the [address] guidelines, call the [facility]) COVID-19 Vaccination Hotline to schedule your appointment or to get additional information about the [facility]) locations which are offering the COVID-19 vaccine. To be most effective, it's important that you receive two doses of one of the COVID-19 vaccines. -If you are receiving the [place] Peter vaccine, your second shot will be scheduled as close to 21 days after the first shot as possible. -If you are receiving the Moderna vaccine, your second shot will be scheduled as close to 28 days after the first shot as possible. [address] Vaccination Hotline: [phone]    In partnership with [facility] and [facility] HEALTH Departments, patients can call [phone], Monday-Friday 8:00am-4:00pm for scheduling at our Hospitals. Or visit the [facility] websites for additional information of vaccine administration locations. Links to [facility]) website and Health Department websites:    [employer]/mercy-[facility]-monitoring-coronavirus-covid-19/covid-19-vaccine/[place]/zamarripa-vaccine    Lists of hospitals in the [place].tn    [email or web address]    SURVEY:    You may be receiving a survey from [employer] regarding your visit today. Please complete the survey to enable us to provide the highest quality of care to you and your family. If you cannot score us a very good on any question, please call the office to discuss how we could have made your experience a very good one. Thank you.     Your MA today: Eloisa Haines

## 2021-06-22 ENCOUNTER — TELEPHONE (OUTPATIENT)
Dept: UROLOGY | Age: 51
End: 2021-06-22

## 2021-06-22 LAB
CULTURE: NORMAL
Lab: NORMAL
SPECIMEN DESCRIPTION: NORMAL

## 2021-06-30 ENCOUNTER — HOSPITAL ENCOUNTER (OUTPATIENT)
Dept: CT IMAGING | Age: 51
Discharge: HOME | End: 2021-07-02

## 2021-06-30 DIAGNOSIS — R31.29 MICROHEMATURIA: ICD-10-CM

## 2021-06-30 PROCEDURE — 74178 CT ABD&PLV WO CNTR FLWD CNTR: CPT

## 2021-06-30 PROCEDURE — 6360000004 HC RX CONTRAST MEDICATION: Performed by: UROLOGY

## 2021-06-30 RX ADMIN — IOPAMIDOL 105 ML: 755 INJECTION, SOLUTION INTRAVENOUS at 11:59

## 2021-07-09 ENCOUNTER — TELEMEDICINE (OUTPATIENT)
Dept: PSYCHIATRY | Age: 51
End: 2021-07-09

## 2021-07-09 DIAGNOSIS — F33.9 MAJOR DEPRESSIVE DISORDER, RECURRENT EPISODE WITH ANXIOUS DISTRESS (HCC): Primary | ICD-10-CM

## 2021-07-09 PROCEDURE — 99213 OFFICE O/P EST LOW 20 MIN: CPT | Performed by: NURSE PRACTITIONER

## 2021-07-09 RX ORDER — HYDROXYZINE HYDROCHLORIDE 25 MG/1
25 TABLET, FILM COATED ORAL 3 TIMES DAILY PRN
Qty: 90 TABLET | Refills: 0 | Status: SHIPPED | OUTPATIENT
Start: 2021-07-09 | End: 2021-11-04 | Stop reason: ALTCHOICE

## 2021-07-09 RX ORDER — FLUOXETINE HYDROCHLORIDE 40 MG/1
40 CAPSULE ORAL DAILY
Qty: 30 CAPSULE | Refills: 0 | Status: SHIPPED | OUTPATIENT
Start: 2021-07-09 | End: 2021-11-04 | Stop reason: ALTCHOICE

## 2021-07-09 NOTE — PROGRESS NOTES
Behavioral Health Consultation  Danii Simmons, MSN, APRN-CNP, PMHNP-BC  7/9/2021, 9:04 AM      Time spent with Patient:  15 minutes  This was a telehealth visit. Patient Location: Home. Provider Location: Home office in [place], [place]  This virtual visit was conducted via interactive/real-time audio/video. Chief Complaint:follow up for anxiety and depression. [place]:  Reason for visit is medication management follow up. She has been compliant with medications. Pt reports that medications have not  been working. Marta Qiu states that she feels the medications not working. She states that she has been having a lot of medical issues lately. She states that she is not engaged in therapy. Pt denies side effects from medications. Pt denies hallucinations. Pt reports there has been changes to appetite. Pt reports sleep has been good. Pt denies  current exercise. Pt denies current suicidal ideation, plan and intent. Pt  denies current homicidal ideation, plan and [email or web address]). Seamus Smith currently, at [employer] full time. Denies any THC use, reports alcohol us about once a month.  Has attended some college. Past Psychiatric history:   The patient has a history of  bipolar disorder and attempted suicide.   Previous treatment has included: Prozac- felt this one worked well, Zoloft- felt sleepy with this, Rickie Meyer remember much about this one, mood stabilizer- abilify- felt this one worked, felt this one worked for her. sleep aid- trazodone, and [facility] felt it stopped working after a couple of weeks and individual therapy- hasn't seen a therapist in six years.    Family Mental Health history:   Pertinent family history: bipolar disorder. Father and Paternal grandmother bipolar.     MSE:    Appearance: alert, cooperative  Attention:Intact  Appetite: normal  Ambulation: unable to assess.    Sleep disturbance: No  Loss of pleasure: Yes  Speech: spontaneous, normal rate, normal volume and well articulated  Mood: Depressed  Affect: depressed affect  Thought Content: intact  Insight: Fair  Judgment: Intact  Memory: Intact long-term and Intact short-term  Suicide Assessment: no suicidal ideation  Homicide Assessment: denies current homicidal ideation, plan and intent    History:      Review of Systems:   Constitutional: negative  HENT: negative  Eyes: negative  Respiratory: negative  Cardiovascular: negative  Gastrointestinal: negative  Genitourinary: negative  Musculoskeletal: negative  Skin:negative  Neurological:negative  Endo/Heme/Allergies:negative       Current Outpatient Medications:     FLUoxetine (PROZAC) 40 MG capsule, Take 1 capsule by mouth daily, Disp: 30 capsule, Rfl: 0    hydrOXYzine (ATARAX) 25 MG tablet, Take 1 tablet by mouth 3 times daily as needed for Anxiety, Disp: 90 tablet, Rfl: 0    celecoxib (CELEBREX) 100 MG capsule, Take 1 capsule by mouth 2 times daily, Disp: 60 capsule, Rfl: 1    lisinopril-hydroCHLOROthiazide (PRINZIDE;ZESTORETIC) 10-12.5 MG per tablet, Take 1 tablet by mouth daily, Disp: 30 tablet, Rfl: 5    methocarbamol (ROBAXIN) 500 MG tablet, 1 to 2 pills by mouth 4 times daily as needed muscle pain/spasm (Patient not taking: Reported on 6/16/2021), Disp: 56 tablet, Rfl: 1    albuterol sulfate HFA (VENTOLIN HFA) 108 (90 Base) MCG/ACT inhaler, Inhale 2 puffs into the lungs 4 times daily as needed for Wheezing (Patient not taking: Reported on 6/16/2021), Disp: 1 Inhaler, Rfl: 0     PDMP Monitoring:    Last PDMP Alek as Reviewed [facility]):  Review User Review Instant Review Result   BEHNFELDT, PHILIP 7/9/2021  9:00 AM Reviewed PDMP [1]     Last Controlled Substance Monitoring Documentation      Telemedicine from 6/4/2021 in [address]   Periodic Controlled Substance Monitoring  No signs of potential drug abuse or diversion identified. filed at 06/04/2021 1571        Urine Drug Screenings (1 yr)    No resulted procedures found. Medication Contract and Consent for Opioid Use Documents Filed      No documents found                 [place]ARRS checked and there were no signs of substance abuse, or prescription misuse. Social History     Socioeconomic History    Marital status: Single     Spouse name: Not on file    Number of children: Not on file    Years of education: Not on file    Highest education level: Not on file   Occupational History    Not on file   Tobacco Use    Smoking status: Current Every Day Smoker     Packs/day: 1.00     Types: Cigarettes    Smokeless tobacco: Never Used   Vaping Use    Vaping Use: Never used   Substance and Sexual Activity    Alcohol use: Yes     Comment: occ    Drug use: Not Currently    Sexual activity: Not on file   Other Topics Concern    Not on file   Social History Narrative    Not on file     Social Determinants of Health     Financial Resource Strain:     Difficulty of Paying Living Expenses:    Food Insecurity: Unknown    Worried About Running Out of Food in the Last Year: Patient refused    Ran Out of Food in the Last Year: Patient refused   Transportation Needs: Unknown    Lack of Transportation (Medical): Patient refused    Lack of Transportation (Non-Medical): Patient refused   Physical Activity:     Days of Exercise per Week:     Minutes of Exercise per Session:    Stress:     Feeling of Stress :    Social Connections:     Frequency of Communication with Friends and Family:     Frequency of Social Gatherings with Friends and Family:     Attends Orthodoxy Services:     Active Member of Clubs or Organizations:     Attends Club or Organization Meetings:     Marital Status:    Intimate Partner Violence:     Fear of Current or Ex-Partner:     Emotionally Abused:     Physically Abused:     Sexually Abused:        TOBACCO: Myrle Phalen  reports that she has been smoking cigarettes. She has been smoking about 1.00 pack per day.  She has never used smokeless tobacco.  ETOH: Coffey Kindra  reports current alcohol use. Past Medical History:   Diagnosis Date    Anxiety     Depression     Family history of thyroid problem     Hypertension     Osteoarthritis       Metabolic monitoring is being done by PCP. Family History   Problem Relation Age of Onset    Other Mother     Diabetes Father     Heart Disease Father     Cancer Maternal Grandfather      Last Labs:   Lab Results   Component Value Date    LABA1C 5.9 05/20/2021     Lab Results   Component Value Date     05/20/2021      Lab Results   Component Value Date    WBC 11.2 03/15/2021    HGB 16.2 (H) 03/15/2021    HCT 49.9 (H) 03/15/2021    MCV 92.2 03/15/2021     03/15/2021    LYMPHOPCT 25 03/15/2021    RBC 5.41 (H) 03/15/2021    MCH 29.9 03/15/2021    MCHC 32.5 03/15/2021    RDW 15.8 (H) 03/15/2021          Lab Results   Component Value Date     03/15/2021    K 4.0 03/15/2021     03/15/2021    CO2 27 03/15/2021    BUN 14 03/15/2021    CREATININE 0.88 03/15/2021    GLUCOSE 102 (H) 03/15/2021    CALCIUM 10.0 03/15/2021    LABGLOM >60 03/15/2021    GFRAA >60 03/15/2021      . last    Diagnosis:      1. Major depressive disorder, recurrent episode with anxious distress ([facility] [address]      Plan:    Discussed increasing the fluoxetine. Discussed risks and benefits of medications, as well as need for yearly lab work. Follow up with [facility] for continued therapy. Continue work with PCP to manage medical concerns, and [employer] [facility] for continued follow-up. No orders of the defined types were placed in this encounter.      Orders Placed This Encounter   Medications    FLUoxetine (PROZAC) 40 MG capsule     Sig: Take 1 capsule by mouth daily     Dispense:  30 capsule     Refill:  0       Pt interventions:    Discussed importance of medication adherence, Discussed risks, benefits, side effects of medication and need for follow up treatment, Discussed benefits of referral for specialty care and Discussed self-care (sleep, nutrition, rewarding activities, social support, exercise)

## 2021-10-19 ENCOUNTER — HOSPITAL ENCOUNTER (EMERGENCY)
Age: 51
Discharge: HOME | End: 2021-10-19
Attending: EMERGENCY MEDICINE

## 2021-10-19 VITALS
SYSTOLIC BLOOD PRESSURE: 169 MMHG | TEMPERATURE: 97.2 F | RESPIRATION RATE: 18 BRPM | DIASTOLIC BLOOD PRESSURE: 94 MMHG | HEIGHT: 59 IN | HEART RATE: 94 BPM | OXYGEN SATURATION: 95 % | WEIGHT: 220 LBS | BODY MASS INDEX: 44.35 KG/M2

## 2021-10-19 DIAGNOSIS — M25.541 JOINT PAIN IN FINGERS OF BOTH HANDS: Primary | ICD-10-CM

## 2021-10-19 DIAGNOSIS — M25.542 JOINT PAIN IN FINGERS OF BOTH HANDS: Primary | ICD-10-CM

## 2021-10-19 DIAGNOSIS — M25.579 ARTHRALGIA OF FOOT, UNSPECIFIED LATERALITY: ICD-10-CM

## 2021-10-19 PROCEDURE — 99282 EMERGENCY DEPT VISIT SF MDM: CPT

## 2021-10-19 RX ORDER — PREDNISONE 20 MG/1
TABLET ORAL
Qty: 18 TABLET | Refills: 0 | Status: SHIPPED | OUTPATIENT
Start: 2021-10-19 | End: 2021-10-29

## 2021-10-19 ASSESSMENT — PAIN DESCRIPTION - LOCATION: LOCATION: ANKLE;FOOT;HIP;KNEE

## 2021-10-19 ASSESSMENT — PAIN SCALES - GENERAL: PAINLEVEL_OUTOF10: 9

## 2021-10-19 ASSESSMENT — PAIN DESCRIPTION - PAIN TYPE: TYPE: ACUTE PAIN;CHRONIC PAIN

## 2021-10-19 ASSESSMENT — PAIN DESCRIPTION - ORIENTATION: ORIENTATION: RIGHT;LEFT

## 2021-10-19 ASSESSMENT — PAIN DESCRIPTION - DESCRIPTORS: DESCRIPTORS: ACHING

## 2021-10-19 NOTE — ED PROVIDER NOTES
[facility] ED  EMERGENCY DEPARTMENT ENCOUNTER      Pt Name: Amparo Reynoso  MRN: [id number]  [place] 1970  Date of evaluation: 10/19/2021  Provider: Zbigniew Marie MD    CHIEF COMPLAINT     Chief Complaint   Patient presents with    Hand Pain     Ongoing x 2 years. Pt states she has been worked up, and is concerned she has RA     Hip Pain     Right, chronic    Knee Pain     Right, chronic    Foot Pain     Bilateral, chronic, no injury. Concern for RA         HISTORY OF PRESENT ILLNESS   (Location/Symptom, Timing/Onset, Context/Setting,Quality, Duration, Modifying Factors, Severity)  Note limiting factors. Amparo Reynoso is a54 y.o. female who presents to the emergency department with a complaint of pain to multiple joints. She has had 2 years of chronic pain including to her hand, hip, knees, and both feet. Patient's been told she has arthritis and is currently on Celebrex. She says she has tried both ibuprofen and Naprosyn without relief. Patient denies any new injuries. She says that she has a strong family history of rheumatoid arthritis and no one is ever checked her for that and would like to be checked. She called her family doctor today and he cannot get her in until early November and they advised her to come here to the emergency department for evaluation. She has had no fevers or chills. She reports the pain she is having now she has had for about 2 years. She is got to the point now she is having trouble walking and doing her work at [employer]. HPI    Nursing Notes werereviewed. REVIEW OF SYSTEMS    (2-9 systems for level 4, 10 or more for level 5)     Review of Systems  Constitutional no fevers or chills  ENT she denies headache or sore throat  Cardiopulmonary no chest pain or shortness of breath  GI no abdominal pain or vomiting  Musculoskeletal she is got pain to both wrists, both hands, both feet, both hips, and both knees.   Except as noted above the remainder of the review of systems was reviewed and negative. PAST MEDICAL HISTORY     Past Medical History:   Diagnosis Date    Anxiety     Depression     Family history of thyroid problem     Hypertension     Osteoarthritis          SURGICALHISTORY       Past Surgical History:   Procedure Laterality Date    CHOLECYSTECTOMY           CURRENT MEDICATIONS       Previous Medications    ALBUTEROL SULFATE HFA (VENTOLIN HFA) 108 (90 BASE) MCG/ACT INHALER    Inhale 2 puffs into the lungs 4 times daily as needed for Wheezing    CELECOXIB (CELEBREX) 100 MG CAPSULE    Take 1 capsule by mouth 2 times daily    FLUOXETINE (PROZAC) 40 MG CAPSULE    Take 1 capsule by mouth daily    HYDROXYZINE (ATARAX) 25 MG TABLET    Take 1 tablet by mouth 3 times daily as needed for Anxiety    LISINOPRIL-HYDROCHLOROTHIAZIDE (PRINZIDE;ZESTORETIC) 10-12.5 MG PER TABLET    Take 1 tablet by mouth daily    METHOCARBAMOL (ROBAXIN) 500 MG TABLET    1 to 2 pills by mouth 4 times daily as needed muscle pain/spasm            Patient has no known allergies.     FAMILY HISTORY       Family History   Problem Relation Age of Onset    Other Mother     Diabetes Father     Heart Disease Father     Cancer Maternal Grandfather           SOCIAL HISTORY       Social History     Socioeconomic History    Marital status: Single     Spouse name: Not on file    Number of children: Not on file    Years of education: Not on file    Highest education level: Not on file   Occupational History    Not on file   Tobacco Use    Smoking status: Current Every Day Smoker     Packs/day: 1.00     Types: Cigarettes    Smokeless tobacco: Never Used   Vaping Use    Vaping Use: Never used   Substance and Sexual Activity    Alcohol use: Yes     Comment: occ    Drug use: Not Currently    Sexual activity: Not on file   Other Topics Concern    Not on file   Social History Narrative    Not on file     Social Determinants of Health     Financial Resource Strain:     Difficulty of Paying Living Expenses:    Food Insecurity: Unknown    Worried About Running Out of Food in the Last Year: Patient refused   [address] in the Last Year: Patient refused   Transportation Needs: Unknown    Lack of Transportation (Medical): Patient refused    Lack of Transportation (Non-Medical): Patient refused   Physical Activity:     Days of Exercise per Week:     Minutes of Exercise per Session:    Stress:     Feeling of Stress :    Social Connections:     Frequency of Communication with Friends and Family:     Frequency of Social Gatherings with Friends and Family:     Attends Restorationist Services:     Active Member of Clubs or Organizations:     Attends Club or Organization Meetings:     Marital Status:    Intimate Partner Violence:     Fear of Current or Ex-Partner:     Emotionally Abused:     Physically Abused:     Sexually Abused:        SCREENINGS                    PHYSICAL EXAM    (up to 7 for level 4, 8 or more for level 5)     ED Triage Vitals   BP Temp Temp src Pulse Resp SpO2 Height Weight   10/19/21 1249 10/19/21 1249 -- 10/19/21 1249 10/19/21 1247 10/19/21 1247 10/19/21 1247 10/19/21 1247   (!) 169/94 97.2 °F (36.2 °C)  94 18 95 % 4' 11\" (1.499 m) 220 lb (99.8 kg)       Physical Exam  Reveals a tearful white female whose blood pressure is elevated but she is afebrile and other vital signs are stable. No icterus is noted. She is not short of breath. Looking at her hands I do not see changes in her fingers consistent with rheumatoid arthritis. No deformities noted. She is tender though over dorsum of both wrists and her IP joints of her hands. She is also tender over other joints but she does not have any evidence of redness or warmth. No focal neurologic symptoms are found.   DIAGNOSTIC RESULTS     EKG: All EKG's are interpreted by the Emergency Department Physician who either signs orCo-signs this chart in the absence of a cardiologist.    No EKGs indicated    RADIOLOGY: plain film images such as CT, Ultrasound and MRI are read by the radiologist. Plain radiographic images are visualized and preliminarily interpreted by the emergency physician with the below findings:    I do not feel x-rays of her joints are really needed at this point. Interpretation per the Radiologist below, ifavailable at the time of this note:    No orders to display         ED BEDSIDE ULTRASOUND:   Performed by ED Physician - none    LABS:  Labs Reviewed   MARIA TERESA   RHEUMATOID FACTOR   SEDIMENTATION RATE       All other labs were within normal range ornot returned as of this dictation. EMERGENCY DEPARTMENT COURSE and DIFFERENTIAL DIAGNOSIS/MDM:   Vitals:    Vitals:    10/19/21 1247 10/19/21 1249   BP:  (!) 169/94   Pulse:  94   Resp: 18    Temp:  97.2 °F (36.2 °C)   SpO2: 95%    Weight: 220 lb (99.8 kg)    Height: 4' 11\" (1.499 m)        I did order rheumatoid factor as well as MARIA TERESA and a sed rate on the patient. She will be given a prednisone taper to use and a work note for the rest of this week. She is advised to see her family doctor soon as possible. I did also suggest you might want to try and see a rheumatologist at some point. MDM   Patient left before she received her discharge instructions. I did call and took prednisone taper by do not know whether she knew to go pick it up. CRITICAL CARE TIME       CONSULTS:  None    PROCEDURES:  Unlessotherwise noted below, none     Procedures    FINAL IMPRESSION      1. Joint pain in fingers of both hands    2.  Arthralgia of foot, unspecified laterality          DISPOSITION/PLAN   DISPOSITION Decision To Discharge 10/19/2021 02:28:02 PM      PATIENT REFERRED TO:  REBEKAH Jeter - CNP  [address] [facility]  [phone]      As soon as possible for recheck and evaluation of lab work-up drawn today      DISCHARGE MEDICATIONS:  New Prescriptions    No medications on file              (Please note that portions of this note were completed with a voice recognition program.  Efforts were made to edit the dictations but occasionally words are mis-transcribed.)      Corey Murphy MD (electronically signed)  Attending Emergency Physician         Corey Frost MD  10/19/21 4374

## 2021-10-19 NOTE — Clinical Note
Faheem Friedman was seen and treated in our emergency department on 10/19/2021. She may return to work on 10/25/2021. Off work this week for severe arthritis in multiple joints     If you have any questions or concerns, please don't hesitate to call.       Romie Copeland MD

## 2021-10-19 NOTE — ED NOTES
Pt not found in room upon rounding. Attempt made to contact pt via phone x 2 without answer.      Andrea Ramsay RN  10/19/21 4789

## 2021-10-31 ENCOUNTER — APPOINTMENT (OUTPATIENT)
Dept: GENERAL RADIOLOGY | Age: 51
End: 2021-10-31

## 2021-10-31 ENCOUNTER — HOSPITAL ENCOUNTER (EMERGENCY)
Age: 51
Discharge: HOME | End: 2021-10-31
Attending: EMERGENCY MEDICINE

## 2021-10-31 VITALS
RESPIRATION RATE: 16 BRPM | TEMPERATURE: 98.7 F | WEIGHT: 220 LBS | OXYGEN SATURATION: 96 % | HEIGHT: 60 IN | DIASTOLIC BLOOD PRESSURE: 91 MMHG | HEART RATE: 91 BPM | SYSTOLIC BLOOD PRESSURE: 147 MMHG | BODY MASS INDEX: 43.19 KG/M2

## 2021-10-31 DIAGNOSIS — M25.551 RIGHT HIP PAIN: Primary | ICD-10-CM

## 2021-10-31 LAB
ABSOLUTE EOS #: 0.16 K/UL (ref 0–0.44)
ABSOLUTE IMMATURE GRANULOCYTE: 0.04 K/UL (ref 0–0.3)
ABSOLUTE LYMPH #: 3.75 K/UL (ref 1.1–3.7)
ABSOLUTE MONO #: 0.93 K/UL (ref 0.1–1.2)
ANION GAP SERPL CALCULATED.3IONS-SCNC: 12 MMOL/L (ref 9–17)
BASOPHILS # BLD: 1 % (ref 0–2)
BASOPHILS ABSOLUTE: 0.08 K/UL (ref 0–0.2)
BUN BLDV-MCNC: 23 MG/DL (ref 6–20)
BUN/CREAT BLD: 28 (ref 9–20)
CALCIUM SERPL-MCNC: 9 MG/DL (ref 8.6–10.4)
CHLORIDE BLD-SCNC: 102 MMOL/L (ref 98–107)
CO2: 22 MMOL/L (ref 20–31)
CREAT SERPL-MCNC: 0.83 MG/DL (ref 0.5–0.9)
DIFFERENTIAL TYPE: ABNORMAL
EOSINOPHILS RELATIVE PERCENT: 2 % (ref 1–4)
GFR AFRICAN AMERICAN: >60 ML/MIN
GFR NON-AFRICAN AMERICAN: >60 ML/MIN
GFR SERPL CREATININE-BSD FRML MDRD: ABNORMAL ML/MIN/{1.73_M2}
GFR SERPL CREATININE-BSD FRML MDRD: ABNORMAL ML/MIN/{1.73_M2}
GLUCOSE BLD-MCNC: 104 MG/DL (ref 70–99)
HCT VFR BLD CALC: 51.2 % (ref 36.3–47.1)
HEMOGLOBIN: 16.7 G/DL (ref 11.9–15.1)
IMMATURE GRANULOCYTES: 0 %
LYMPHOCYTES # BLD: 38 % (ref 24–43)
MCH RBC QN AUTO: 30.3 PG (ref 25.2–33.5)
MCHC RBC AUTO-ENTMCNC: 32.6 G/DL (ref 28.4–34.8)
MCV RBC AUTO: 92.9 FL (ref 82.6–102.9)
MONOCYTES # BLD: 9 % (ref 3–12)
NRBC AUTOMATED: 0 PER 100 WBC
PDW BLD-RTO: 14.6 % (ref 11.8–14.4)
PLATELET # BLD: 370 K/UL (ref 138–453)
PLATELET ESTIMATE: ABNORMAL
PMV BLD AUTO: 9.4 FL (ref 8.1–13.5)
POTASSIUM SERPL-SCNC: 4.2 MMOL/L (ref 3.7–5.3)
RBC # BLD: 5.51 M/UL (ref 3.95–5.11)
RBC # BLD: ABNORMAL 10*6/UL
SEDIMENTATION RATE, ERYTHROCYTE: 7 MM (ref 0–20)
SEG NEUTROPHILS: 50 % (ref 36–65)
SEGMENTED NEUTROPHILS ABSOLUTE COUNT: 5.05 K/UL (ref 1.5–8.1)
SODIUM BLD-SCNC: 136 MMOL/L (ref 135–144)
WBC # BLD: 10 K/UL (ref 3.5–11.3)
WBC # BLD: ABNORMAL 10*3/UL

## 2021-10-31 PROCEDURE — 6360000002 HC RX W HCPCS: Performed by: EMERGENCY MEDICINE

## 2021-10-31 PROCEDURE — 86038 ANTINUCLEAR ANTIBODIES: CPT

## 2021-10-31 PROCEDURE — 85025 COMPLETE CBC W/AUTO DIFF WBC: CPT

## 2021-10-31 PROCEDURE — 86225 DNA ANTIBODY NATIVE: CPT

## 2021-10-31 PROCEDURE — 36415 COLL VENOUS BLD VENIPUNCTURE: CPT

## 2021-10-31 PROCEDURE — 73502 X-RAY EXAM HIP UNI 2-3 VIEWS: CPT

## 2021-10-31 PROCEDURE — 99283 EMERGENCY DEPT VISIT LOW MDM: CPT

## 2021-10-31 PROCEDURE — 6370000000 HC RX 637 (ALT 250 FOR IP): Performed by: EMERGENCY MEDICINE

## 2021-10-31 PROCEDURE — 85652 RBC SED RATE AUTOMATED: CPT

## 2021-10-31 PROCEDURE — 80048 BASIC METABOLIC PNL TOTAL CA: CPT

## 2021-10-31 PROCEDURE — 86431 RHEUMATOID FACTOR QUANT: CPT

## 2021-10-31 PROCEDURE — 96375 TX/PRO/DX INJ NEW DRUG ADDON: CPT

## 2021-10-31 PROCEDURE — 96374 THER/PROPH/DIAG INJ IV PUSH: CPT

## 2021-10-31 RX ORDER — METHYLPREDNISOLONE SODIUM SUCCINATE 125 MG/2ML
125 INJECTION, POWDER, LYOPHILIZED, FOR SOLUTION INTRAMUSCULAR; INTRAVENOUS ONCE
Status: COMPLETED | OUTPATIENT
Start: 2021-10-31 | End: 2021-10-31

## 2021-10-31 RX ORDER — KETOROLAC TROMETHAMINE 30 MG/ML
15 INJECTION, SOLUTION INTRAMUSCULAR; INTRAVENOUS ONCE
Status: COMPLETED | OUTPATIENT
Start: 2021-10-31 | End: 2021-10-31

## 2021-10-31 RX ORDER — CELECOXIB 100 MG/1
100 CAPSULE ORAL 2 TIMES DAILY
Qty: 10 CAPSULE | Refills: 0 | Status: SHIPPED | OUTPATIENT
Start: 2021-10-31 | End: 2021-11-24

## 2021-10-31 RX ORDER — ACETAMINOPHEN 500 MG
500 TABLET ORAL EVERY 6 HOURS PRN
COMMUNITY

## 2021-10-31 RX ORDER — METHOCARBAMOL 500 MG/1
750 TABLET, FILM COATED ORAL 4 TIMES DAILY
Status: DISCONTINUED | OUTPATIENT
Start: 2021-10-31 | End: 2021-10-31 | Stop reason: HOSPADM

## 2021-10-31 RX ADMIN — METHOCARBAMOL TABLETS 750 MG: 500 TABLET, COATED ORAL at 20:02

## 2021-10-31 RX ADMIN — METHYLPREDNISOLONE SODIUM SUCCINATE 125 MG: 125 INJECTION, POWDER, FOR SOLUTION INTRAMUSCULAR; INTRAVENOUS at 20:02

## 2021-10-31 RX ADMIN — KETOROLAC TROMETHAMINE 15 MG: 30 INJECTION, SOLUTION INTRAMUSCULAR at 20:02

## 2021-10-31 ASSESSMENT — PAIN SCALES - GENERAL
PAINLEVEL_OUTOF10: 5
PAINLEVEL_OUTOF10: 8

## 2021-10-31 ASSESSMENT — ENCOUNTER SYMPTOMS
COUGH: 0
VOMITING: 0
NAUSEA: 0
EYE REDNESS: 0
ABDOMINAL PAIN: 0

## 2021-10-31 ASSESSMENT — PAIN DESCRIPTION - LOCATION: LOCATION: HIP;BACK

## 2021-10-31 ASSESSMENT — PAIN DESCRIPTION - PAIN TYPE: TYPE: ACUTE PAIN

## 2021-10-31 ASSESSMENT — PAIN DESCRIPTION - DESCRIPTORS: DESCRIPTORS: ACHING

## 2021-10-31 NOTE — ED PROVIDER NOTES
[facility] ED  EMERGENCY DEPARTMENT ENCOUNTER      Pt Name: Alberta De Santiago  MRN: [id number]  [place] 1970  Date of evaluation: 10/31/2021  Provider: Nakita Rajan MD    CHIEF COMPLAINT       Chief Complaint   Patient presents with    Hip Pain     No known injury. pain in right hip/ lower back since 10/29         HISTORY OF PRESENT ILLNESS   (Location/Symptom, Timing/Onset, Context/Setting, Quality, Duration, Modifying Factors, Severity)  Note limiting factors. Alberta De Santiago is a 52 y.o. female with PMH anxiety, depression who presents to the emergency department with right hip pain for the past 2 days. Patient states she has had worsening right hip pain and is having difficulty bearing weight due to pain. States the pain is in the right hip and right buttocks. She denies any recent fall or injury. She was seen a couple weeks ago for joint pain with plans to undergo rheumatoid arthritis blood work but had to leave before this was completed. She was prescribed steroids at that time which she states helped. She has an appointment with a primary care provider this coming week. HPI    Nursing Notes were reviewed. REVIEW OF SYSTEMS    (2-9 systems for level 4, 10 or more for level 5)     Review of Systems   Constitutional: Negative for chills and fever. HENT: Negative for congestion. Eyes: Negative for redness. Respiratory: Negative for cough. Cardiovascular: Negative for chest pain. Gastrointestinal: Negative for abdominal pain, nausea and vomiting. Genitourinary: Negative for dysuria. Musculoskeletal: Positive for arthralgias. Negative for myalgias. Skin: Negative for rash. Neurological: Negative for headaches. Except as noted above the remainder of the review of systems was reviewed and negative.        PAST MEDICAL HISTORY     Past Medical History:   Diagnosis Date    Anxiety     Depression     Family history of thyroid problem     Hypertension     Osteoarthritis          SURGICAL HISTORY       Past Surgical History:   Procedure Laterality Date    CHOLECYSTECTOMY           CURRENT MEDICATIONS       Discharge Medication List as of 10/31/2021  9:09 PM      CONTINUE these medications which have NOT CHANGED    Details   acetaminophen (TYLENOL) 500 MG tablet Take 500 mg by mouth every 6 hours as needed for PainHistorical Med      FLUoxetine (PROZAC) 40 MG capsule Take 1 capsule by mouth daily, Disp-30 capsule, R-0Normal      hydrOXYzine (ATARAX) 25 MG tablet Take 1 tablet by mouth 3 times daily as needed for Anxiety, Disp-90 tablet, R-0Normal      lisinopril-hydroCHLOROthiazide (PRINZIDE;ZESTORETIC) 10-12.5 MG per tablet Take 1 tablet by mouth daily, Disp-30 tablet, R-5Normal      methocarbamol (ROBAXIN) 500 MG tablet 1 to 2 pills by mouth 4 times daily as needed muscle pain/spasm, Disp-56 tablet, R-1Normal      albuterol sulfate HFA (VENTOLIN HFA) 108 (90 Base) MCG/ACT inhaler Inhale 2 puffs into the lungs 4 times daily as needed for Wheezing, Disp-1 Inhaler,R-0Print             ALLERGIES     Patient has no known allergies.     FAMILY HISTORY       Family History   Problem Relation Age of Onset    Other Mother     Diabetes Father     Heart Disease Father     Cancer Maternal Grandfather           SOCIAL HISTORY       Social History     Socioeconomic History    Marital status: Single     Spouse name: None    Number of children: None    Years of education: None    Highest education level: None   Occupational History    None   Tobacco Use    Smoking status: Current Every Day Smoker     Packs/day: 1.00     Types: Cigarettes    Smokeless tobacco: Never Used   Vaping Use    Vaping Use: Never used   Substance and Sexual Activity    Alcohol use: Yes     Comment: occ    Drug use: Not Currently    Sexual activity: None   Other Topics Concern    None   Social History Narrative    None     Social Determinants of Health     Financial Resource Strain:     Difficulty of Paying Living Expenses:    Food Insecurity: Unknown    Worried About Running Out of Food in the Last Year: Patient refused   [address] in the Last Year: Patient refused   Transportation Needs: Unknown    Lack of Transportation (Medical): Patient refused    Lack of Transportation (Non-Medical): Patient refused   Physical Activity:     Days of Exercise per Week:     Minutes of Exercise per Session:    Stress:     Feeling of Stress :    Social Connections:     Frequency of Communication with Friends and Family:     Frequency of Social Gatherings with Friends and Family:     Attends Zoroastrian Services:     Active Member of Clubs or Organizations:     Attends Club or Organization Meetings:     Marital Status:    Intimate Partner Violence:     Fear of Current or Ex-Partner:     Emotionally Abused:     Physically Abused:     Sexually Abused:        SCREENINGS                        PHYSICAL EXAM    (up to 7 for level 4, 8 or more for level 5)     ED Triage Vitals   BP Temp Temp Source Pulse Resp SpO2 Height Weight   10/31/21 1908 10/31/21 1908 10/31/21 1908 10/31/21 1907 10/31/21 1907 10/31/21 1907 10/31/21 1907 10/31/21 1907   (!) 147/91 98.7 °F (37.1 °C) Tympanic 91 16 96 % 5' (1.524 m) 220 lb (99.8 kg)       Physical Exam  Vitals and nursing note reviewed. Constitutional:       Appearance: Normal appearance. She is well-developed. HENT:      Head: Normocephalic. Right Ear: External ear normal.      Left Ear: External ear normal.      Nose: Nose normal.      Mouth/Throat:      Mouth: Mucous membranes are moist.   Eyes:      Conjunctiva/sclera: Conjunctivae normal.   Cardiovascular:      Rate and Rhythm: Normal rate and regular rhythm. Heart sounds: Normal heart sounds. Pulmonary:      Effort: Pulmonary effort is normal.      Breath sounds: Normal breath sounds. Abdominal:      General: There is no distension. Palpations: Abdomen is soft. Tenderness:  There is no abdominal tenderness. Musculoskeletal:         General: Tenderness (Right hip, joint does not appear irritable) present. Normal range of motion. Cervical back: Normal range of motion. Skin:     General: Skin is warm and dry. Neurological:      General: No focal deficit present. Mental Status: She is alert and oriented to person, place, and time. Sensory: No sensory deficit. Motor: No weakness. DIAGNOSTIC RESULTS     EKG: All EKG's are interpreted by the Emergency Department Physician who either signs or Co-signs this chart in the absence of a cardiologist.      RADIOLOGY:   Non-plain film images such as CT, Ultrasound and MRI are read by the radiologist. Plain radiographic images are visualized and preliminarily interpreted by the emergency physician with the below findings:      Interpretation per the Radiologist below, if available at the time of this note:    XR HIP RIGHT (2-3 VIEWS)   Final Result   Unremarkable right hip. ED BEDSIDE ULTRASOUND:   Performed by ED Physician - none    LABS:  Labs Reviewed   CBC WITH AUTO DIFFERENTIAL - Abnormal; Notable for the following components:       Result Value    RBC 5.51 (*)     Hemoglobin 16.7 (*)     Hematocrit 51.2 (*)     RDW 14.6 (*)     Absolute Lymph # 3.75 (*)     All other components within normal limits   BASIC METABOLIC PANEL W/ REFLEX TO MG FOR LOW K - Abnormal; Notable for the following components:    Glucose 104 (*)     BUN 23 (*)     Bun/Cre Ratio 28 (*)     All other components within normal limits   SEDIMENTATION RATE   MARIA TERESA   RHEUMATOID FACTOR       All other labs were within normal range or not returned as of this dictation.     EMERGENCY DEPARTMENT COURSE/MDM:   Vitals:    Vitals:    10/31/21 1907 10/31/21 1908   BP:  (!) 147/91   Pulse: 91    Resp: 16    Temp:  98.7 °F (37.1 °C)   TempSrc:  Tympanic   SpO2: 96%    Weight: 220 lb (99.8 kg)    Height: 5' (1.524 m)          This is a 52 y.o. female presenting with right hip pain. Records reviewed, significant for occult conditions as described. Initial orders included CBC, BMP, ESR, MARIA TERESA and rheumatoid factor along with right hip x-ray. Labs and imaging significant for no acute abnormalities, MARIA TERESA and RF are still pending. Interventions included Solu-Medrol, Toradol, Robaxin with improvement of symptoms. Likely hip strain or arthritis. Patient will be discharged with follow up to PCP this week, patient has appointment in three days. Discussed with patient and/or family members/companions accompanying patient who agree with plan. All questions were answered. CONSULTS:  None    PROCEDURES:  Unless otherwise noted below, none     Procedures    FINAL IMPRESSION      1. Right hip pain          DISPOSITION/PLAN   DISPOSITION Decision To Discharge 10/31/2021 09:07:18 PM      PATIENT REFERRED TO:  REBEKAH Roe - [address]  [phone]    Schedule an appointment as soon as possible for a visit         DISCHARGE MEDICATIONS:  Discharge Medication List as of 10/31/2021  9:09 PM        Controlled Substances Monitoring:     RX Monitoring 7/9/2021   Periodic Controlled Substance Monitoring No signs of potential drug abuse or diversion identified.        (Please note that portions of this note were completed with a voice recognition program.  Efforts were made to edit the dictations but occasionally words are mis-transcribed.)    Nakita Rajan MD (electronically signed)  Attending Emergency Physician           Timothy Kothari MD  10/31/21 2125

## 2021-11-01 ENCOUNTER — TELEPHONE (OUTPATIENT)
Dept: PRIMARY CARE CLINIC | Age: 51
End: 2021-11-01

## 2021-11-01 LAB — RHEUMATOID FACTOR: 11 IU/ML

## 2021-11-01 NOTE — TELEPHONE ENCOUNTER
[facility] Trans[facility] Initial Follow Up Call    Outreach made within 2 business days of discharge: Yes    Patient: Iram Mora Patient : 1970   MRN: [id number]  Reason for Admission: There are no discharge diagnoses documented for the most recent discharge. Discharge Date: 10/31/21       Spoke with: 21 @ 10:41am Called patient re: recent ER visit, no answer and unable to leave message- mailbox is full. cf   21 @ 12:2pm Called patient and she states that she is starting to feel slightly better after going to the ER and receiving steroid injection. Has f/u appt on Wednesday. cf     Discharge department/facility: [facility]    TCM Interactive Patient Contact:  Was patient able to fill all prescriptions:yes   Was patient instructed to bring all medications to the follow-up visit: yes  Is patient taking all medications as directed in the discharge summary? yes  Does patient understand their discharge instructions: yes  Does patient have questions or concerns that need addressed prior to 7-14 day follow up office visit: yes, has appt.      Scheduled appointment with PCP within 7-14 days    Follow Up  Future Appointments   Date Time Provider Pao Elias   11/3/2021 10:00 AM Francisco Javier Garcia, APRN - [facility]       Ortiz Schulz

## 2021-11-03 ENCOUNTER — OFFICE VISIT (OUTPATIENT)
Dept: PRIMARY CARE CLINIC | Age: 51
End: 2021-11-03

## 2021-11-03 VITALS
BODY MASS INDEX: 43.74 KG/M2 | DIASTOLIC BLOOD PRESSURE: 86 MMHG | HEART RATE: 90 BPM | WEIGHT: 222.8 LBS | SYSTOLIC BLOOD PRESSURE: 140 MMHG | HEIGHT: 60 IN | RESPIRATION RATE: 18 BRPM | TEMPERATURE: 97.1 F

## 2021-11-03 DIAGNOSIS — H66.002 ACUTE SUPPURATIVE OTITIS MEDIA OF LEFT EAR WITHOUT SPONTANEOUS RUPTURE OF TYMPANIC MEMBRANE, RECURRENCE NOT SPECIFIED: ICD-10-CM

## 2021-11-03 DIAGNOSIS — R35.0 FREQUENCY OF URINATION: ICD-10-CM

## 2021-11-03 DIAGNOSIS — M54.41 ACUTE RIGHT-SIDED LOW BACK PAIN WITH RIGHT-SIDED SCIATICA: Primary | ICD-10-CM

## 2021-11-03 DIAGNOSIS — I10 ESSENTIAL HYPERTENSION: ICD-10-CM

## 2021-11-03 LAB
ANTI DNA DOUBLE STRANDED: <0.5 IU/ML
ANTI-NUCLEAR ANTIBODY (ANA): NEGATIVE
BILIRUBIN, POC: ABNORMAL
BLOOD URINE, POC: ABNORMAL
CLARITY, POC: CLEAR
COLOR, POC: YELLOW
ENA ANTIBODIES SCREEN: <0.1 U/ML
GLUCOSE URINE, POC: ABNORMAL
KETONES, POC: ABNORMAL
LEUKOCYTE EST, POC: ABNORMAL
NITRITE, POC: ABNORMAL
PH, POC: 6
PROTEIN, POC: ABNORMAL
SPECIFIC GRAVITY, POC: 1.01
UROBILINOGEN, POC: 0.2

## 2021-11-03 PROCEDURE — 99214 OFFICE O/P EST MOD 30 MIN: CPT | Performed by: NURSE PRACTITIONER

## 2021-11-03 PROCEDURE — 81002 URINALYSIS NONAUTO W/O SCOPE: CPT | Performed by: NURSE PRACTITIONER

## 2021-11-03 RX ORDER — CELECOXIB 100 MG/1
100 CAPSULE ORAL DAILY
Qty: 60 CAPSULE | Refills: 3 | Status: SHIPPED | OUTPATIENT
Start: 2021-11-03 | End: 2021-11-03 | Stop reason: ALTCHOICE

## 2021-11-03 RX ORDER — CELECOXIB 100 MG/1
100 CAPSULE ORAL 2 TIMES DAILY
Qty: 180 CAPSULE | Refills: 1 | Status: SHIPPED | OUTPATIENT
Start: 2021-11-03 | End: 2021-11-24

## 2021-11-03 RX ORDER — BACLOFEN 10 MG/1
10 TABLET ORAL 3 TIMES DAILY
Qty: 90 TABLET | Refills: 0 | Status: SHIPPED | OUTPATIENT
Start: 2021-11-03 | End: 2021-11-24

## 2021-11-03 RX ORDER — GRANULES FOR ORAL 3 G/1
3 POWDER ORAL ONCE
Qty: 1 EACH | Refills: 0 | Status: SHIPPED | OUTPATIENT
Start: 2021-11-03 | End: 2021-11-03 | Stop reason: ALTCHOICE

## 2021-11-03 RX ORDER — CEFDINIR 300 MG/1
300 CAPSULE ORAL 2 TIMES DAILY
Qty: 20 CAPSULE | Refills: 0 | Status: SHIPPED | OUTPATIENT
Start: 2021-11-03 | End: 2021-11-13

## 2021-11-03 ASSESSMENT — ENCOUNTER SYMPTOMS
GASTROINTESTINAL NEGATIVE: 1
BACK PAIN: 1
RESPIRATORY NEGATIVE: 1
EYES NEGATIVE: 1
ALLERGIC/IMMUNOLOGIC NEGATIVE: 1

## 2021-11-03 NOTE — PATIENT INSTRUCTIONS
first, but it delays healing. Avoid bed rest after the first day of back pain. · Change positions every 30 minutes. If you must sit for long periods of time, take breaks from sitting. Get up and walk around, or lie in a comfortable position. · Try using a heating pad on a low or medium setting for 15 to 20 minutes every 2 or 3 hours. Try a warm shower in place of one session with the heating pad. · You can also try an ice pack for 10 to 15 minutes every 2 to 3 hours. Put a thin cloth between the ice pack and your skin. · Take pain medicines exactly as directed. ? If the doctor gave you a prescription medicine for pain, take it as prescribed. ? If you are not taking a prescription pain medicine, ask your doctor if you can take an over-the-counter medicine. · Take short walks several times a day. You can start with 5 to 10 minutes, 3 or 4 times a day, and work up to longer walks. Walk on level surfaces and avoid hills and stairs until your back is better. · Return to work and other activities as soon as you can. Continued rest without activity is usually not good for your back. · To prevent future back pain, do exercises to stretch and strengthen your back and stomach. Learn how to use good posture, safe lifting techniques, and proper body mechanics. When should you call for help? Call your doctor now or seek immediate medical care if:    · You have new or worsening numbness in your legs.     · You have new or worsening weakness in your legs. (This could make it hard to stand up.)     · You lose control of your bladder or bowels. Watch closely for changes in your health, and be sure to contact your doctor if:    · You have a fever, lose weight, or don't feel well.     · You do not get better as expected. Where can you learn more? Go to https://divya.[employer]. org and sign in to your Vizify account.  Enter R815 in the Helpmycash box to learn more about \"Back Pain: Care Instructions. \"     If you do not have an account, please click on the \"Sign Up Now\" link. Current as of: July 1, 2021               Content Version: 13.0  © 2006-2021 Healthwise, Incorporated. Care instructions adapted under license by [facility] ([facility]). If you have questions about a medical condition or this instruction, always ask your healthcare professional. [address] any warranty or liability for your use of this information. Patient Education        Learning About High Blood Pressure  What is high blood pressure? Blood pressure is a measure of how hard the blood pushes against the walls of your arteries. It's normal for blood pressure to go up and down throughout the day. But if it stays up, you have high blood pressure. Another name for high blood pressure is hypertension. Two numbers tell you your blood pressure. The first number is the systolic pressure (top number). It shows how hard the blood pushes when your heart is pumping. The second number is the diastolic pressure (bottom number). It shows how hard the blood pushes between heartbeats, when your heart is relaxed and filling with blood. Your doctor will give you a goal for your blood pressure based on your health and your age. High blood pressure (hypertension) means that the top number stays high, or the bottom number stays high, or both. High blood pressure increases the risk of stroke, heart attack, and other problems. What happens when you have high blood pressure? · Blood flows through your arteries with too much force. Over time, this can damage the heart and the walls of your arteries. But you can't feel it. High blood pressure usually doesn't cause symptoms. · High blood pressure makes your heart work harder. And that can lead to heart failure, which means your heart doesn't pump as much blood as your body needs. · Fat and calcium start to build up in your arteries.  This buildup is called hardening of the arteries. It can cause many problems including a heart attack and stroke. · Arteries also carry blood and oxygen to organs like your eyes, kidneys, and brain. If high blood pressure damages those arteries, it can lead to vision loss, kidney disease, stroke, and a higher risk of dementia. How can you prevent high blood pressure? · Stay at a healthy weight. · Try to limit how much sodium you eat to less than 2,300 milligrams (mg) a day. If you limit your sodium to 1,500 mg a day, you can lower your blood pressure even more. ? Buy foods that are labeled \"unsalted,\" \"sodium-free,\" or \"low-sodium. \" Foods labeled \"reduced-sodium\" and \"light sodium\" may still have too much sodium. ? Flavor your food with garlic, lemon juice, onion, vinegar, herbs, and spices instead of salt. Do not use soy sauce, steak sauce, onion salt, garlic salt, mustard, or ketchup on your food. ? Use less salt (or none) when recipes call for it. You can often use half the salt a recipe calls for without losing flavor. · Be physically active. Get at least 30 minutes of exercise on most days of the week. Walking is a good choice. You also may want to do other activities, such as running, swimming, cycling, or playing tennis or team sports. · Limit alcohol to 2 drinks a day for men and 1 drink a day for women. · Eat plenty of fruits, vegetables, and low-fat dairy products. Eat less saturated and total fats. How is high blood pressure treated? · Your doctor will suggest making lifestyle changes to help your heart. For example, your doctor may ask you to eat healthy foods, quit smoking, lose extra weight, and be more active. · If lifestyle changes don't help enough, your doctor may recommend that you take medicine. · When blood pressure is very high, medicines are needed to lower it. Follow-up care is a key part of your treatment and safety. Be sure to make and go to all appointments, and call your doctor if you are having problems. It's also a good idea to know your test results and keep a list of the medicines you take. Where can you learn more? Go to https://chpepiceweb.[employer]. org and sign in to your Nambii account. Enter P501 in the Andera box to learn more about \"Learning About High Blood Pressure. \"     If you do not have an account, please click on the \"Sign Up Now\" link. Current as of: April 29, 2021               Content Version: 13.0  © 7990-5389 [employer]Time. Care instructions adapted under license by [facility] ([facility]). If you have questions about a medical condition or this instruction, always ask your healthcare professional. [place] [address] any warranty or liability for your use of this information. Patient Education        Acute Low Back Pain: Exercises  Introduction  Here are some examples of typical rehabilitation exercises for your condition. Start each exercise slowly. Ease off the exercise if you start to have pain. Your doctor or physical therapist will tell you when you can start these exercises and which ones will work best for you. When you are not being active, find a comfortable position for rest. Some people are comfortable on the floor or a medium-firm bed with a small pillow under their head and another under their knees. Some people prefer to lie on their side with a pillow between their knees. Don't stay in one position for too long. Take short walks (10 to 20 minutes) every 2 to 3 hours. Avoid slopes, hills, and stairs until you feel better. Walk only distances you can manage without pain, especially leg pain. How to do the exercises  Back stretches    1. Get down on your hands and knees on the floor. 2. Relax your head and allow it to droop. Round your back up toward the ceiling until you feel a nice stretch in your upper, middle, and lower back. Hold this stretch for as long as it feels comfortable, or about 15 to 30 seconds.   3. Return to the starting position with a flat back while you are on your hands and knees. 4. Let your back sway by pressing your stomach toward the floor. Lift your buttocks toward the ceiling. 5. Hold this position for 15 to 30 seconds. 6. Repeat 2 to 4 times. Follow-up care is a key part of your treatment and safety. Be sure to make and go to all appointments, and call your doctor if you are having problems. It's also a good idea to know your test results and keep a list of the medicines you take. Where can you learn more? Go to https://Trade[employer]. org and sign in to your PlexPress account. Enter O683 in the EncrypTix box to learn more about \"Acute Low Back Pain: Exercises. \"     If you do not have an account, please click on the \"Sign Up Now\" link. Current as of: December 17, 2020               Content Version: 13.0  © 9080-9755 [employer]Arcad[employer], Incorporated. Care instructions adapted under license by [facility] ([facility]). If you have questions about a medical condition or this instruction, always ask your healthcare professional. [place] [address] any warranty or liability for your use of this information.

## 2021-11-03 NOTE — LETTER
[address]  [address]  [place] [address] [place] [address]  Phone: [phone]  Fax: REBEKAH Munoz CNP        November 3, 2021     Patient: Krishan Foreman   YOB: 1970   Date of Visit: 11/3/2021       To Whom it May Concern:    Anju Chaudhry was seen in my clinic on 11/3/2021. She may return to work on after further evaluation from PT and Neurology. .  Minimal of 1 week off and will continue to evaluate and update. If you have any questions or concerns, please don't hesitate to call.     Sincerely,         REBEKAH Perez CNP

## 2021-11-03 NOTE — PROGRESS NOTES
Name: Krishan Foreman  : 1970         Chief Complaint:     Chief Complaint   Patient presents with    Hip Pain     right side pain does shoot down her leg and will have some numbness and pins and needle feeling for the last 2 weeks        History of Present Illness:      Krishan Foreman is a 52 y.o.  female who presents with Hip Pain (right side pain does shoot down her leg and will have some numbness and pins and needle feeling for the last 2 weeks )    Friday night started with right lower back and hip pain that is constant. Describes pain as a constant pain in lower back and hip.  went to the ER and they did a hip xray. The xray was negative. Last night she started with \"pins and needles\" feeling in right leg that will come and go. She received a steroid injection in the ER  that has helped with the joint pain but not the back and leg. She has not taken Tylenol at home with no improvement. She is also having urinary frequency. She denies dysuria. Left ear pain for 3 days. No fever. Hypertension-She is not taking her Lisinopril-Hydrochlorothiazide. She states she has been busy and did not fill her prescription. Terry Velez states that at this time she does not have insurance and this is why she has not gone to previous referrals. She is working at [employer] currently and states her insurance should be starting soon.       Past Medical History:     Past Medical History:   Diagnosis Date    Anxiety     Depression     Family history of thyroid problem     Hypertension     Osteoarthritis       Reviewed all health maintenance requirements and ordered appropriate tests  Health Maintenance Due   Topic Date Due    Cervical cancer screen  Never done    Colon cancer screen colonoscopy  Never done    Flu vaccine (1) Never done       Past Surgical History:     Past Surgical History:   Procedure Laterality Date    CHOLECYSTECTOMY          Medications:       Prior to Admission medications    Medication Sig Start Date End Date Taking? Authorizing Provider   baclofen (LIORESAL) 10 MG tablet Take 1 tablet by mouth 3 times daily 11/3/21 12/3/21 Yes REBEKAH Lambert CNP   cefdinir (OMNICEF) 300 MG capsule Take 1 capsule by mouth 2 times daily for 10 days 11/3/21 11/13/21 Yes REBEKAH Lambert CNP   celecoxib (CELEBREX) 100 MG capsule Take 1 capsule by mouth 2 times daily 11/3/21  Yes REBEKAH Lambert - CNP   celecoxib (CELEBREX) 100 MG capsule Take 1 capsule by mouth 2 times daily for 5 days 10/31/21 11/5/21 Yes Lasha Dickson MD   acetaminophen (TYLENOL) 500 MG tablet Take 500 mg by mouth every 6 hours as needed for Pain    Historical Provider, MD   FLUoxetine (PROZAC) 40 MG capsule Take 1 capsule by mouth daily  Patient not taking: Reported on 11/3/2021 7/9/21   REBEKAH Dee NP   hydrOXYzine (ATARAX) 25 MG tablet Take 1 tablet by mouth 3 times daily as needed for Anxiety  Patient not taking: Reported on 11/3/2021 7/9/21   REBEKAH Dee NP   lisinopril-hydroCHLOROthiazide (PRINZIDE;ZESTORETIC) 10-12.5 MG per tablet Take 1 tablet by mouth daily  Patient not taking: Reported on 11/3/2021 5/14/21   REBEKAH Bishop CNP   albuterol sulfate HFA (VENTOLIN HFA) 108 (90 Base) MCG/ACT inhaler Inhale 2 puffs into the lungs 4 times daily as needed for Wheezing  Patient not taking: Reported on 6/16/2021 8/18/20   Elinor Johnson II, PA-C        Allergies:       Patient has no known allergies. Social History:     Tobacco:    reports that she has been smoking cigarettes. She has been smoking about 1.00 pack per day. She has never used smokeless tobacco.  Alcohol:      reports current alcohol use. Drug Use:  reports previous drug use.     Family History:     Family History   Problem Relation Age of Onset    Other Mother     Diabetes Father     Heart Disease Father     Cancer Maternal Grandfather        Review of Systems:     Positive and Negative as described in HPI    Review of Systems   Constitutional: Negative for appetite change, fatigue and fever. HENT: Positive for ear pain. Eyes: Negative. Respiratory: Negative. Cardiovascular: Negative. Gastrointestinal: Negative. Endocrine: Negative. Genitourinary: Positive for frequency. Negative for dysuria. Musculoskeletal: Positive for back pain. Right hip pain with sciatica      Skin: Negative. Allergic/Immunologic: Negative. Neurological: Negative. Hematological: Negative. Psychiatric/Behavioral: Negative. Physical Exam:   Vitals:  BP (!) 140/86 (Site: Right Upper Arm, Position: Sitting, Cuff Size: Large Adult) Comment: manual  Pulse 90   Temp 97.1 °F (36.2 °C)   Resp 18   Ht 5' (1.524 m)   Wt 222 lb 12.8 oz (101.1 kg)   LMP 07/27/2020   BMI 43.51 kg/m²     Physical Exam  Vitals and nursing note reviewed. Constitutional:       Appearance: Normal appearance. HENT:      Head: Normocephalic. Right Ear: Tympanic membrane is erythematous. Tympanic membrane is not injected. Left Ear: Tympanic membrane is injected and erythematous. Nose: Nose normal.      Mouth/Throat:      Lips: Pink. Mouth: Mucous membranes are moist.      Pharynx: Oropharynx is clear. Eyes:      Conjunctiva/sclera: Conjunctivae normal.      Pupils: Pupils are equal, round, and reactive to light. Cardiovascular:      Rate and Rhythm: Normal rate and regular rhythm. Heart sounds: Normal heart sounds. Pulmonary:      Effort: Pulmonary effort is normal.      Breath sounds: Normal breath sounds. Musculoskeletal:      Cervical back: Normal and normal range of motion. Thoracic back: Normal.      Lumbar back: Spasms and tenderness (on right side) present. No swelling or signs of trauma. Decreased range of motion. Skin:     General: Skin is warm. Capillary Refill: Capillary refill takes less than 2 seconds.    Neurological:      General: No focal deficit present. Mental Status: She is alert and oriented to person, place, and time. Psychiatric:         Mood and Affect: Mood normal.         Data:     Lab Results   Component Value Date     10/31/2021    K 4.2 10/31/2021     10/31/2021    CO2 22 10/31/2021    BUN 23 10/31/2021    CREATININE 0.83 10/31/2021    GLUCOSE 104 10/31/2021     Lab Results   Component Value Date    WBC 10.0 10/31/2021    RBC 5.51 10/31/2021    HGB 16.7 10/31/2021    HCT 51.2 10/31/2021    MCV 92.9 10/31/2021    MCH 30.3 10/31/2021    MCHC 32.6 10/31/2021    RDW 14.6 10/31/2021     10/31/2021    MPV 9.4 10/31/2021     Lab Results   Component Value Date    TSH 1.83 05/20/2021     Lab Results   Component Value Date    CHOL 226 05/20/2021    HDL 61 05/20/2021    LABA1C 5.9 05/20/2021       Assessment/Plan:      Diagnosis Orders   1. Acute right-sided low back pain with right-sided sciatica  POCT Urinalysis no Micro    Ambulatory referral to [address] [facility]   2. Acute suppurative otitis media of left ear without spontaneous rupture of tympanic membrane, recurrence not specified     3. Essential hypertension     4. Frequency of urination     Right Sided low back pain with sciatica-Neurology referral and will try Baclofen for muscle spasms. PT referral.  Celebrex was started in the ER with some improvement and will continue until Neurology referral.    Hypertension-Discussed elevated blood pressure at today's visit and education provided. Patient encouraged to resume Lisinopril-hydrochlorothiazide as prescribed. Pt. Voices understanding and states will resume medication. Otitis Media-Start Omnicef as prescribed. Warm compress to ear for pain and discomfort. Urinary Frequency-Will send urine out for a culture. Radha Couch provides UTI coverage if bacteria present in urine.     1.  Lenore Garg received counseling on the following healthy behaviors: nutrition, exercise and medication adherence  2. Patient given educational materials - see patient instructions  3. Was a self-tracking handout given in paper form or via Realmt? No  If yes, see orders or list here. 4.  Discussed use, benefit, and side effects of prescribed medications. Barriers to medication compliance addressed. All patient questions answered. Pt voiced understanding. 5.  Reviewed prior labs and health maintenance  6. Continue current medications, diet and exercise. Completed Refills   Requested Prescriptions     Signed Prescriptions Disp Refills    baclofen (LIORESAL) 10 MG tablet 90 tablet 0     Sig: Take 1 tablet by mouth 3 times daily    cefdinir (OMNICEF) 300 MG capsule 20 capsule 0     Sig: Take 1 capsule by mouth 2 times daily for 10 days    celecoxib (CELEBREX) 100 MG capsule 180 capsule 1     Sig: Take 1 capsule by mouth 2 times daily         Return in about 1 month (around 12/3/2021).

## 2021-11-09 ENCOUNTER — TELEPHONE (OUTPATIENT)
Dept: PRIMARY CARE CLINIC | Age: 51
End: 2021-11-09

## 2021-11-09 NOTE — TELEPHONE ENCOUNTER
Patient calls office stating she has emergent issues she needs seen for. Writer asked patient what symptoms she is having and she states she has extreme dizziness, confusion, and rapid heart rate. Writer advised patient she needs to go to the ER for evaluation. Patient states she did not want to go to the ER. Writer advised patient with the symptoms she has she needs to be evaluated in the ER. Patient verbalized understanding.

## 2021-11-10 ENCOUNTER — HOSPITAL ENCOUNTER (OUTPATIENT)
Dept: PHYSICAL THERAPY | Age: 51
Setting detail: THERAPIES SERIES
Discharge: HOME | End: 2021-11-10

## 2021-11-10 PROCEDURE — 97035 APP MDLTY 1+ULTRASOUND EA 15: CPT

## 2021-11-10 PROCEDURE — G0283 ELEC STIM OTHER THAN WOUND: HCPCS

## 2021-11-10 PROCEDURE — 97161 PT EVAL LOW COMPLEX 20 MIN: CPT

## 2021-11-12 NOTE — PROGRESS NOTES
Phone: [facility]          Fax: [phone]                      Outpatient Physical Therapy                                                                      Evaluation  Date: 11/10/2021  Patient: Marissa Wang  : 1970  CSN #: [id number]  Referring Practitioner: REBEKAH Emmanuel    Referral Date : 21     Medical Diagnosis: Acute right-sided low back pain with right-sided sciatica, M54.41    Treatment Diagnosis: Lumbago  Onset Date: 10/03/21 (Pt did report this was an issue from working at [employer])  PT Insurance Information: Self Pay  Total # of Visits Approved: 18   Total # of Visits to Date: 1  No Show: 0  Canceled Appointment: 0     Subjective  Subjective: Incontinence for approx \"couple month\" which progressively got worse. She still complains of urgency but it's worse now than initially. Pt reports abdominal pain since about 4 to 5 days ago and pelvic area with it primarily in R LB and has now also traveled to shoulders and shoulder blades. She reports burning and numbness in R hip and at times can be \"stabbing. \" Pain is rated at about 6-7/10 now and will increase to 10/10 which she has went to the ER. Pt states any position bothers her if it's an extended amount of time. Recent med change has helped control the pain somewhat which is still bad but better with the med. Pt pain wakes her about every hour on the hour. She is currently off work and is unsure when she will return. 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 Comments: Neurologist Dec 23rd in outpatient here at [facility] and returns to doctor on Dec 8th.   Additional Pertinent Hx: HTN, Panic attacks, thyroid, vision problems, bladder, current smoker, heart problems, arthritis, breathing problems, and depression       Objective     Observation/Palpation  Posture: Fair  Observation: Pt maintained positions without noted frequent position changes. RLE General PROM: 25* SLR  LLE General PROM: 50* SLR    Spine  Lumbar: ROM taken with ruler from floor measurement: Flex - 14 1/2\", Ext - 26\", R SB - 20 1/4\", L SB - 16 1/2\"  Special Tests: SLR limited abraham however both (-), pt did complain of pain from \"stretch\" in LB and anterior thigh pain. Slumps (-) abraham though patient reports pain through abraham shoulders and cervical, chest area. Strength Other  Other: Poor core strength and unable to sit up from supine position without use of UEs      Exercises:  Exercise 1: **HEP issued including body mechanics and postural ex at wall    Manual:       Modalities:  Moist heat: if needed  Cryotherapy (Minutes\Location): x15 min for pain and discomfort  Ultrasound: 1.5 W/cm2, 1 MHz R LB  E-stim (parameters): x15 min to LB for pain and tightness, discomfort in LB     Functional Outcome Measures  Pain Intensity: E. The pain is very severe at the moment  [facility], etc.): C. It is painful to look after myself and I am slow and careful  Walking: D. Pain prevents me from walking more than 100 yards  Sitting: D. Pain prevents me from sitting more than 1/2 hour  Standing: F. Pain prevents me from standing at all  Sleeping: F. Pain prevents me from sleeping at all  Social Life : D. Pain has restricted my social life and I do not go out as often  Traveling : E. Pain restricts me to short necessary journeys under 30 minutes  [employer] Disability Total Scores: 34       Assessment  Assessment: Pt was referred with Acute right-sided low back pain with right-sided sciatica, M54.41 and PMHx of HTN, Panic attacks, thyroid, vision problems, bladder, current smoker, heart problems, arthritis, breathing problems, and depression. She is currently not working due to pain and states she is unable to complete most ADLs. She has limited strength, ROM, and significant pain. Pt findings per eval, see chart.  Pt will be progressed regarding strengthening and prep to return to work in the [employer]. Patient Education  Patient Education: Discussed HEP and posture. Pt verbalized/demonstrated good understanding:     [X] Yes         [] No, pt required further clarification. Goals  Short term goals  Time Frame for Short term goals: 3 weeks  Short term goal 1: Initiate HEP and posture/body mechanics training  Short term goal 2: Pt will be able to be initiated on exercises for ROM and stability    Long term goals  Time Frame for Long term goals : 6 weeks  Long term goal 1: Pt will be independent and compliant with HEP  Long term goal 2: Pt will report relief of her pain complaints to better tolerate mobility. Long term goal 3: Pt core strength will be at least 3/5 to improve stability for proper body mechanics with lifting. Long term goal 4: Pt will demonstrate functional trunk ROM in order to stoop and squat as needed for proper lifting to protect her LB.       Patient goals : to decrease pain and get back to work        Minutes Tracking:  Time In: 1312  Time Out: 1415  Minutes: 63  Timed Code Treatment Minutes: Nicole 50, Rudolph Solis DPT    11/10/2021

## 2021-11-12 NOTE — PLAN OF CARE
[facility]           Phone: [phone]             Outpatient Physical Therapy  Fax: [phone]                                           Date: 11/10/2021  Patient: Alex Pearson : 1970 CSN #: [id number]   Referring Practitioner:  REBEKAH Sena Referral Date:  21       [x] Plan of Care   [] Updated Plan of Care    Dates of Service to Include: 11/10/2021 to 21    Diagnosis:  Acute right-sided low back pain with right-sided sciatica, M54.41    Rehab (Treatment) Diagnosis:  Lumbago             Onset Date:  10/03/21 (Pt did report this was an issue from working at [employer])    Attendance  Total # of Visits to Date: 1 No Show: 0 Canceled Appointment: 0    Assessment  Assessment: Pt was referred with Acute right-sided low back pain with right-sided sciatica, M54.41 and PMHx of HTN, Panic attacks, thyroid, vision problems, bladder, current smoker, heart problems, arthritis, breathing problems, and depression. She is currently not working due to pain and states she is unable to complete most ADLs. She has limited strength, ROM, and significant pain. Pt findings per eval, see chart. Pt will be progressed regarding strengthening and prep to return to work in the [employer]. Goals  Short term goals  Time Frame for Short term goals: 3 weeks  Short term goal 1: Initiate HEP and posture/body mechanics training  Short term goal 2: Pt will be able to be initiated on exercises for ROM and stability  Long term goals  Time Frame for Long term goals : 6 weeks  Long term goal 1: Pt will be independent and compliant with HEP  Long term goal 2: Pt will report relief of her pain complaints to better tolerate mobility. Long term goal 3: Pt core strength will be at least 3/5 to improve stability for proper body mechanics with lifting.   Long term goal 4: Pt will demonstrate functional trunk ROM in order to stoop and squat as needed for proper lifting to protect her LB.      Prognosis   Good    Treatment Plan   Times per week: 3  Plan weeks: 6  [x] HP/CP      [x] Electrical Stim   [x] Therapeutic Exercise      [] Gait Training  [x] Aquatics   [x] Ultrasound         [x] Patient Education/HEP   [x] Manual Therapy  [] Traction    [x] Neuro-skye        [x] Soft Tissue Mobs            [] Home TENS  [] Iontophoresis    [] Orthotic casting/fitting      [x] Dry Needling             Electronically signed by: Dee Marte PT, DPT    Date: 11/10/2021      ______________________________________ Date: 11/10/2021   Physician Signature

## 2021-11-16 ENCOUNTER — TELEPHONE (OUTPATIENT)
Dept: PRIMARY CARE CLINIC | Age: 51
End: 2021-11-16

## 2021-11-16 NOTE — PROGRESS NOTES
[facility]  Inpatient/Observation/Outpatient Rehabilitation    Date: 2021  Patient Name: Ellen Painting    [x]  Outpatient  : 1970     [x] Pt refused/declined therapy at this time due to:          Patient requested to cancel all visits due to severe pain after her evaluation. Patient will call to reschedule if she is cleared to return by neurologist (appointment on 2021).   Patient is worried of \"making things worse\"    Majnula Serve Date: 2021

## 2021-11-16 NOTE — TELEPHONE ENCOUNTER
Call to pt to clarify disability form information. Pt stated she would not continue physical therapy as the first session caused her \"pain for days\". I advised her to contact PT to discuss. She stated she would.

## 2021-11-18 ENCOUNTER — APPOINTMENT (OUTPATIENT)
Dept: PHYSICAL THERAPY | Age: 51
End: 2021-11-18

## 2021-11-19 ENCOUNTER — APPOINTMENT (OUTPATIENT)
Dept: PHYSICAL THERAPY | Age: 51
End: 2021-11-19

## 2021-11-19 ENCOUNTER — TELEPHONE (OUTPATIENT)
Dept: PRIMARY CARE CLINIC | Age: 51
End: 2021-11-19

## 2021-11-19 NOTE — TELEPHONE ENCOUNTER
Phone call from patient stating that her back pain is getting worse and she is having other issues related to sciatica problems. States her neurology appt was moved from January to December 23 and she would like to know what she should do to speed the process up. Informed patient that she should go to the Er if the pain was getting worse or to call neurologist office to see if they had any cancelations. Verbalizes understanding. (Informed patient that no providers were in the office today. ).

## 2021-11-21 ENCOUNTER — HOSPITAL ENCOUNTER (EMERGENCY)
Age: 51
Discharge: HOME | End: 2021-11-21
Attending: EMERGENCY MEDICINE

## 2021-11-21 VITALS
DIASTOLIC BLOOD PRESSURE: 72 MMHG | HEART RATE: 87 BPM | OXYGEN SATURATION: 97 % | TEMPERATURE: 97.8 F | RESPIRATION RATE: 18 BRPM | SYSTOLIC BLOOD PRESSURE: 128 MMHG

## 2021-11-21 DIAGNOSIS — G89.29 ACUTE EXACERBATION OF CHRONIC LOW BACK PAIN: Primary | ICD-10-CM

## 2021-11-21 DIAGNOSIS — M54.50 ACUTE EXACERBATION OF CHRONIC LOW BACK PAIN: Primary | ICD-10-CM

## 2021-11-21 PROCEDURE — 99282 EMERGENCY DEPT VISIT SF MDM: CPT

## 2021-11-21 RX ORDER — PREDNISONE 50 MG/1
TABLET ORAL
Qty: 5 TABLET | Refills: 0 | Status: SHIPPED | OUTPATIENT
Start: 2021-11-21 | End: 2021-12-01

## 2021-11-21 ASSESSMENT — ENCOUNTER SYMPTOMS
SHORTNESS OF BREATH: 0
ABDOMINAL PAIN: 0
SORE THROAT: 0
COUGH: 0
VOMITING: 0
NAUSEA: 0
BACK PAIN: 1
RHINORRHEA: 0
DIARRHEA: 0

## 2021-11-21 ASSESSMENT — PAIN DESCRIPTION - FREQUENCY: FREQUENCY: CONTINUOUS

## 2021-11-21 ASSESSMENT — PAIN DESCRIPTION - ORIENTATION: ORIENTATION: RIGHT;LEFT

## 2021-11-21 ASSESSMENT — PAIN DESCRIPTION - LOCATION: LOCATION: BACK;BUTTOCKS

## 2021-11-21 ASSESSMENT — PAIN SCALES - GENERAL: PAINLEVEL_OUTOF10: 7

## 2021-11-21 ASSESSMENT — PAIN DESCRIPTION - PAIN TYPE: TYPE: ACUTE PAIN

## 2021-11-21 NOTE — ED PROVIDER NOTES
[facility]  EMERGENCY DEPARTMENT ENCOUNTER    Pt Name: Nohemi Orourke  MRN: [id number]  [id number]  Date of evaluation: 11/21/2021      CHIEF COMPLAINT       Chief Complaint   Patient presents with    Back Pain     muscles spasm of  back and buttocks rt and left side. reports multiple c/o discomfort, reports has appointmet with neurologist on 12/23  Also report degen. joint disease of spine         HISTORY OF PRESENT ILLNESS    Nohemi Orourke is a 54 y.o. female who presents exacerbation of ongoing neck and back pain. Radicular symptoms into the right leg. She has seen PCP for this was placed on Celebrex and baclofen with some relief. However this morning pain was worse. Not anticoagulated no neck or back surgeries in the past no injections procedures. Does have urge incontinence but not overflow no bowel incontinence. No injuries no trauma. Awaiting outpatient appoint with neurology        REVIEW OF SYSTEMS       Review of Systems   Constitutional: Negative for chills and fever. HENT: Negative for rhinorrhea and sore throat. Eyes: Negative for visual disturbance. Respiratory: Negative for cough and shortness of breath. Cardiovascular: Negative for chest pain. Gastrointestinal: Negative for abdominal pain, diarrhea, nausea and vomiting. Genitourinary: Negative for difficulty urinating. Musculoskeletal: Positive for back pain and neck pain. Skin: Negative for rash. Neurological: Negative for weakness and headaches. PAST MEDICAL HISTORY    has a past medical history of Anxiety, Depression, Family history of thyroid problem, Hypertension, and Osteoarthritis. SURGICAL HISTORY      has a past surgical history that includes Cholecystectomy.     CURRENT MEDICATIONS       Previous Medications    ACETAMINOPHEN (TYLENOL) 500 MG TABLET    Take 500 mg by mouth every 6 hours as needed for Pain    BACLOFEN (LIORESAL) 10 MG TABLET    Take 1 tablet by mouth 3 times daily    CELECOXIB (CELEBREX) 100 MG CAPSULE    Take 1 capsule by mouth 2 times daily for 5 days    CELECOXIB (CELEBREX) 100 MG CAPSULE    Take 1 capsule by mouth 2 times daily    LISINOPRIL-HYDROCHLOROTHIAZIDE (PRINZIDE;ZESTORETIC) 10-12.5 MG PER TABLET    Take 1 tablet by mouth daily       ALLERGIES     has No Known Allergies. FAMILY HISTORY     She indicated that her mother is alive. She indicated that her father is . She indicated that the status of her maternal grandfather is unknown.     family history includes Cancer in her maternal grandfather; Diabetes in her father; Heart Disease in her father; Other in her mother. SOCIAL HISTORY      reports that she has been smoking cigarettes. She has been smoking about 1.00 pack per day. She has never used smokeless tobacco. She reports current alcohol use. She reports previous drug use. PHYSICAL EXAM     INITIAL VITALS:  tympanic temperature is 97.8 °F (36.6 °C). Her blood pressure is 128/72 and her pulse is 87. Her respiration is 18 and oxygen saturation is 97%. Physical Exam  Constitutional:       Appearance: Normal appearance. She is not toxic-appearing. HENT:      Head: Normocephalic and atraumatic. Neck:      Comments: No midline neck tenderness no step-offs or deformities only paraspinal tenderness  Cardiovascular:      Rate and Rhythm: Normal rate and regular rhythm. Pulses: Normal pulses. Pulmonary:      Effort: Pulmonary effort is normal.      Breath sounds: Normal breath sounds. Abdominal:      Palpations: Abdomen is soft. Tenderness: There is no abdominal tenderness. Musculoskeletal:         General: Normal range of motion. Comments: No midline lumbar tenderness right paraspinal.   Neurological:      Mental Status: She is alert. Comments: Patient has 5/5 strength for for shoulder ABduction, elbow flexion and extension, wrist extension, and finger ABduction bilaterally.   Intact sensation bilaterally in all upper extremity dermatomes. Patient has 5/5 strength bilaterally for hip flexion, knee flexion and extension, dorsiflexion, plantar flexion, and great toe extension. DTR 2+ and equal bilaterally at knee. Intact sensation to light touch bilaterally all dermatomes in lower extremities. Normal gait       Psychiatric:         Mood and Affect: Mood normal.         DIFFERENTIAL DIAGNOSIS/ MDM:     Given the chronic nature of neck and back pain no neuro deficit on exam no red flags do not feel needs emergent imaging this time. Given radicular symptoms will start steroid burst.  Continue to follow with PCP. Recommend that she call neurology to see if her appointment can be moved up. Patient voiced understanding reasons turn is agreeable discharge    DIAGNOSTIC RESULTS     EKG: All EKG's are interpreted by the Emergency Department Physician who either signs or Co-signs this chart in the [address] a cardiologist.    none    RADIOLOGY:   I directly visualized the following  images and reviewed theradiologist interpretations:   none      ED BEDSIDE ULTRASOUND:   none    LABS:  Labs Reviewed - No data to display    none    EMERGENCY DEPARTMENT COURSE:   Vitals:    Vitals:    11/21/21 1008   BP: 128/72   Pulse: 87   Resp: 18   Temp: 97.8 °F (36.6 °C)   TempSrc: Tympanic   SpO2: 97%     -------------------------  BP: 128/72, Temp: 97.8 °F (36.6 °C), Pulse: 87, Resp: 18        CRITICAL CARE:     none    CONSULTS:  None    PROCEDURES:  None    FINAL IMPRESSION      1.  Acute exacerbation of chronic low back pain          DISPOSITION/PLAN       PATIENT REFERRED TO:  REBEKAH Michelle - [facility]  [id number] [id number] [phone]    In 2 days        DISCHARGE MEDICATIONS:  New Prescriptions    PREDNISONE (DELTASONE) 50 MG TABLET    Take 1 tablets by mouth once daily for 5 days       (Please note that portions of this note were completed with a voice recognition program.  Efforts were made to edit the dictations butoccasionally words are mis-transcribed. )    Damaris Gutierrez MD  Attending Emergency Physician                     Damaris Gutierrez MD  11/21/21 1037

## 2021-11-22 ENCOUNTER — TELEPHONE (OUTPATIENT)
Dept: PRIMARY CARE CLINIC | Age: 51
End: 2021-11-22

## 2021-11-22 NOTE — TELEPHONE ENCOUNTER
[facility] Initial Follow Up Call    Outreach made within 2 business days of discharge: Yes    Patient: Candi Melton Patient : 1970   MRN: [id number]  Reason for Admission: There are no discharge diagnoses documented for the most recent discharge. Discharge Date: 21       Spoke with: Jodi Silva    Discharge department/facility: [facility] ER    TCM Interactive Patient Contact:  Was patient able to fill all prescriptions: Yes  Was patient instructed to bring all medications to the follow-up visit: Yes  Is patient taking all medications as directed in the discharge summary?  Yes  Does patient understand their discharge instructions: Yes  Does patient have questions or concerns that need addressed prior to 7-14 day follow up office visit: yes - appt scheduled    Scheduled appointment with PCP within 7-14 days    Follow Up  Future Appointments   Date Time Provider Pao Elias   2021  9:30 AM REBEKAH Hunt - CNP [facility]   2021 10:40 AM Danny Mcdonald MD [facility]       Kim Mancera

## 2021-11-23 ENCOUNTER — APPOINTMENT (OUTPATIENT)
Dept: PHYSICAL THERAPY | Age: 51
End: 2021-11-23

## 2021-11-24 ENCOUNTER — OFFICE VISIT (OUTPATIENT)
Dept: PRIMARY CARE CLINIC | Age: 51
End: 2021-11-24
Payer: MEDICAID

## 2021-11-24 VITALS
BODY MASS INDEX: 45.43 KG/M2 | TEMPERATURE: 97.8 F | OXYGEN SATURATION: 96 % | SYSTOLIC BLOOD PRESSURE: 130 MMHG | DIASTOLIC BLOOD PRESSURE: 80 MMHG | RESPIRATION RATE: 18 BRPM | WEIGHT: 231.4 LBS | HEIGHT: 60 IN | HEART RATE: 88 BPM

## 2021-11-24 DIAGNOSIS — M54.41 ACUTE RIGHT-SIDED LOW BACK PAIN WITH RIGHT-SIDED SCIATICA: Primary | ICD-10-CM

## 2021-11-24 DIAGNOSIS — M54.31 BILATERAL SCIATICA: ICD-10-CM

## 2021-11-24 DIAGNOSIS — M54.32 BILATERAL SCIATICA: ICD-10-CM

## 2021-11-24 DIAGNOSIS — M54.17 LUMBOSACRAL RADICULOPATHY: ICD-10-CM

## 2021-11-24 PROCEDURE — 99214 OFFICE O/P EST MOD 30 MIN: CPT | Performed by: NURSE PRACTITIONER

## 2021-11-24 RX ORDER — PREDNISONE 20 MG/1
20 TABLET ORAL 2 TIMES DAILY
Qty: 10 TABLET | Refills: 0 | Status: SHIPPED | OUTPATIENT
Start: 2021-11-24 | End: 2021-11-29

## 2021-11-24 RX ORDER — BACLOFEN 20 MG/1
20 TABLET ORAL 3 TIMES DAILY
Qty: 90 TABLET | Refills: 0 | Status: SHIPPED | OUTPATIENT
Start: 2021-11-24 | End: 2021-12-23 | Stop reason: SDUPTHER

## 2021-11-24 RX ORDER — MELOXICAM 15 MG/1
15 TABLET ORAL DAILY
Qty: 30 TABLET | Refills: 3 | Status: SHIPPED | OUTPATIENT
Start: 2021-11-24 | End: 2022-03-24 | Stop reason: SDUPTHER

## 2021-11-24 ASSESSMENT — ENCOUNTER SYMPTOMS
BACK PAIN: 1
ALLERGIC/IMMUNOLOGIC NEGATIVE: 1
EYES NEGATIVE: 1
NAUSEA: 1
ABDOMINAL PAIN: 1
RESPIRATORY NEGATIVE: 1
ABDOMINAL DISTENTION: 1

## 2021-11-24 NOTE — PATIENT INSTRUCTIONS
SURVEY:     You may be receiving a survey from [employer] regarding your visit today. Please complete the survey to enable us to provide the highest quality of care to you and your family. If you cannot score us a very good on any question, please call the office to discuss how we could have made your experience a very good one. Thank you. Tatianna Rothman, APRN-CNP  Pablito Edwards, CNP  Simimacarena Garcia, LPN  Mayela Woods, LPN  Tatianna Taylor, RMA  Marium Tello, CMA  Rosmery, CMA  Cookie, PCA    Patient Education        Learning About Low Back Pain  What is low back pain? Low back pain is pain that can occur anywhere below the ribs and above the legs. It is very common. Almost everyone has it at one time or another. Low back pain can be:  · Acute. This is new pain that can last a few days to a few weeksat the most a few months. · Chronic. This pain can last for more than a few months. Sometimes it can last for years. What are some myths about low back pain? Here are some common myths about low back painand the facts:  Myth: \"I need to rest my back when I have back pain. \"   Fact: Staying active won't hurt you. It may help you get better faster. Myth: \"I need prescription pain medicine. \"   Fact: It's best to try to let time and being active heal your back. Opioid pain medicinessuch as hydrocodone or oxycodoneusually don't work any better than over-the-counter medicines like ibuprofen or naproxen. And opioids can cause serious problems like opioid use disorder or overdose. Moderate to severe opioid use disorder is sometimes called addiction. Myth: \"I need a test like an X-ray or an MRI to diagnose my low back pain. \"   Fact: Getting a test right away won't help you get better faster. And it could lead you down a treatment path you may not need, since most people get better on their own. What causes low back pain? In most cases, there isn't a clear cause.  This can be frustrating, because your back hurts and there's no obvious reason. Your back pain can be caused by:  Overuse or muscle strain. This can happen from playing sports, lifting heavy things, or not being physically fit. A herniated disc. This is a problem with the cushion between the bones in your back. Arthritis. With age, you may have changes in your bones that can narrow the space around your nerves. Other causes. In rare cases, the cause is a serious illness like an infection or cancer. But there are usually other symptoms too. What are the symptoms? Back pain can come on quickly or over time. You may feel:  · Pain in your hips or buttock. · Leg pain, numbness, tingling, or weakness. When a nerve gets squeezedsuch as from a disc problem or arthritisyou may have symptoms in your leg or foot. You can even have leg symptoms from a back problem without having any pain in your back. · Pain that's sharp or dull, sometimes with stiffness or muscle spasms. It may be in one small area or over a broad area. But even bad pain doesn't mean that it's caused by something serious. How is low back pain diagnosed? A physical exam is the main way to diagnose low back pain. Your doctor may examine your back, check your nerves by testing your reflexes, and make sure that your muscles are strong. Your doctor also will ask questions about your back and overall health. Most people don't need any tests right away. Tests often don't show the reason for your pain. If your pain lasts more than 6 weeks or you have symptoms that your doctor is more concerned about, then your doctor may order tests. These may include an X-ray, a CT scan, or an MRI. Sometimes other tests such as a bone scan or nerve conduction test may be done. How is low back pain treated? Most acute low back pain gets better on its own within a few weeks, no matter what the cause. Time and doing usual activities are all that most people need to feel better.   Using heat or ice and taking over-the-counter pain medicine also can help while your body heals. If you aren't getting better on your own or your pain is very bad, your doctor may recommend:  · Physical therapy. · Spinal manipulation, such as by a chiropractor. · Acupuncture. · Massage. · Injections of steroid medicine in your back (especially for pain that involves your legs). If you have chronic low back pain, treatment will help you understand and manage your pain. Treatment may include:  · Staying active. This may include walking or doing back exercises. · Physical therapy. · Medicines. Some of these medicines are also used for other problems, like depression. · Pain management. Your doctor may have you see a pain specialist.  · Counseling. Having chronic pain can be hard. It may help to talk to someone who can help you cope with your pain. Surgery isn't needed for most people. But it may help some types of low back pain. Follow-up care is a key part of your treatment and safety. Be sure to make and go to all appointments, and call your doctor if you are having problems. It's also a good idea to know your test results and keep a list of the medicines you take. When should you call for help? Call 911 anytime you think you may need emergency care. For example, call if:  · You can't move a leg at all. Call your doctor now or seek immediate medical care if:  · You have new or worse symptoms in your legs, belly, or buttocks. Symptoms may include:  ? Numbness or tingling. ? Weakness. ? Pain. · You lose bladder or bowel control. Watch closely for changes in your health, and be sure to contact your doctor if:  · Along with the back pain, you have a fever, lose weight, or don't feel well. · You do not get better as expected. Where can you learn more? Go to https://divya.[employer]. org and sign in to your [employer] account. Enter A007 in the [employer] box to learn more about \"Learning About Low Back Pain. \"     If you do not have an account, please click on the \"Sign Up Now\" link. Current as of: July 1, 2021               Content Version: 13.0  © 2006-2021 [employer]. Care instructions adapted under license by [facility] ([facility]). If you have questions about a medical condition or this instruction, always ask your healthcare professional. [address] any warranty or liability for your use of this information. Patient Education        meloxicam (oral/injection)  Pronunciation:  feliberto OKS i levy  Brand:  Anjeso, Mobic, Qmiiz ODT, Vivlodex  What is the most important information I should know about meloxicam?  Meloxicam can increase your risk of fatal heart attack or stroke. Do not use this medicine just before or after heart bypass surgery (coronary artery bypass graft, or CABG). Meloxicam may also cause stomach or intestinal bleeding, which can be fatal.  What is meloxicam?  Meloxicam is a nonsteroidal anti-inflammatory drug (NSAID) that is used to treat osteoarthritis or rheumatoid arthritis in adults. Meloxicam is also used to treat juvenile rheumatoid arthritis in children who are at least 3years old. The Anjeso brand of meloxicam is used to treat moderate to severe pain in adults. Vivlodex is for use only in adults. Loreto Hoops is for adults and children weighing at least 132 pounds (60 kilograms). Meloxicam may also be used for purposes not listed in this medication guide. What should I discuss with my healthcare provider before taking meloxicam?  Meloxicam can increase your risk of fatal heart attack or stroke, even if you don't have any risk factors. Do not use this medicine just before or after heart bypass surgery (coronary artery bypass graft, or CABG). Meloxicam may also cause stomach or intestinal bleeding, which can be fatal. These conditions can occur without warning while you are using meloxicam, especially in older adults.   You should not use meloxicam if you are allergic to it, or if you have ever had an asthma attack or severe allergic reaction after taking aspirin or an NSAID. You should not take meloxicam disintegrating tablets (Qmiiz ODT) if you have phenylketonuria (PKU). This form of meloxicam contains phenylalanine. Tell your doctor if you have ever had:  · heart disease, high blood pressure, high cholesterol, diabetes, or if you smoke;  · a heart attack, stroke, or blood clot;  · ulcers or bleeding in your stomach;  · asthma;  · kidney disease (or if you are on dialysis);  · liver disease; or  · fluid retention. If you are pregnant, you should not take meloxicam unless your doctor tells you to. Taking an NSAID during the last 20 weeks of pregnancy can cause serious heart or kidney problems in the unborn baby and possible complications with your pregnancy. Meloxicam may cause a delay in ovulation (the release of an egg from an ovary). You should not take meloxicam if you are undergoing fertility treatment, or are otherwise trying to get pregnant. It may not be safe to breastfeed while using this medicine. Ask your doctor about any risk. Meloxicam is not approved for use by anyone younger than 3years old. How should I take meloxicam?  Follow all directions on your prescription label and read all medication guides. Use the lowest dose that is effective in treating your condition. Meloxicam oral is taken by mouth. Meloxicam injection is given as an infusion into a vein. A healthcare provider will give you this injection. You may take meloxicam oral with or without food. Remove an orally disintegrating tablet from the package only when you are ready to take the medicine. Place the tablet in your mouth and allow it to dissolve, without chewing. Swallow several times as the tablet dissolves. Your dose needs may change if you switch to a different brand, strength, or form of this medicine.   Avoid medication errors by using only the form and strength your doctor prescribes. Meloxicam doses are based on weight (especially in children and teenagers). Your dose needs may change if you gain or lose weight. If you use this medicine long-term, you may need frequent medical tests. Store meloxicam tablets or capsules at room temperature, away from moisture and heat. Keep the bottle tightly closed when not in use. What happens if I miss a dose? Take the medicine as soon as you can, but skip the missed dose if it is almost time for your next dose. Do not take two doses at one time. What happens if I overdose? Seek emergency medical attention or call the Poison Help line at 1-969.377.7175. What should I avoid while taking meloxicam?  Avoid alcohol. Heavy drinking can increase your risk of stomach bleeding. Avoid taking aspirin while you are taking meloxicam, unless your doctor tells you to. Ask a doctor or pharmacist before using other medicines for pain, fever, swelling, or cold/flu symptoms. They may contain ingredients similar to meloxicam (such as aspirin, ibuprofen, ketoprofen, or naproxen). What are the possible side effects of meloxicam?  Get emergency medical help if you have signs of an allergic reaction (hives, difficult breathing, swelling in your face or throat) or a severe skin reaction (fever, sore throat, burning eyes, skin pain, red or purple skin rash with blistering and peeling). Get emergency medical help if you have signs of a heart attack or stroke: chest pain spreading to your jaw or shoulder, sudden numbness or weakness on one side of the body, slurred speech, leg swelling, feeling short of breath. Stop using meloxicam and call your doctor at once if you have:  · the first sign of any skin rash, no matter how mild;  · shortness of breath (even with mild exertion);   · swelling or rapid weight gain;  · signs of stomach bleeding --bloody or tarry stools, coughing up blood or vomit that looks like coffee grounds;  · liver problems --nausea, upper stomach pain, itching, tired feeling, flu-like symptoms, loss of appetite, dark urine, mehreen-colored stools, jaundice (yellowing of the skin or eyes);  · low red blood cells (anemia) --pale skin, unusual tiredness, feeling light-headed, cold hands and feet; or  · kidney problems --little or no urination, swelling in your feet or ankles, feeling tired or short of breath. Common side effects may include:  · stomach pain, nausea, vomiting, heartburn;  · diarrhea, constipation, gas;  · dizziness; or  · cold symptoms, flu symptoms. This is not a complete list of side effects and others may occur. Call your doctor for medical advice about side effects. You may report side effects to FDA at 0-684-FDA-0087. What other drugs will affect meloxicam?  Ask your doctor before using meloxicam if you take an antidepressant. Taking certain antidepressants with an NSAID may cause you to bruise or bleed easily. Tell your doctor about all your other medicines, especially:  · cyclosporine;  · lithium;  · methotrexate;  · pemetrexed;  · sodium polystyrene sulfonate (Kayexalate);  · a blood thinner (warfarin, Coumadin, Jantoven);  · heart or blood pressure medication, including a diuretic or \"water pill\"; or  · steroid medicine (such as prednisone). This list is not complete. Other drugs may affect meloxicam, including prescription and over-the-counter medicines, vitamins, and herbal products. Not all possible drug interactions are listed here. Where can I get more information? Your pharmacist can provide more information about meloxicam.  Remember, keep this and all other medicines out of the reach of children, never share your medicines with others, and use this medication only for the indication prescribed. Every effort has been made to ensure that the information provided by [address] is accurate, up-to-date, and complete, but no guarantee is made to that effect.  Drug information contained herein may be

## 2021-11-24 NOTE — PROGRESS NOTES
Name: Kelton Bello  : 1970         Chief Complaint:     Chief Complaint   Patient presents with    Follow-up     ED follow up for low back pain -pt states she is still having pain        History of Present Illness:      Kelton Bello is a 54 y.o.  female who presents with Follow-up (ED follow up for low back pain -pt states she is still having pain )    Nishant Geronimo is here today for an ER follow up. She was in ER on 2021 for increased back pain with sciatica. She was given Prednisone for 5 days and states this has helped. She does still have pain with a \"pinching sensation\" this starts on right right side of hip and travels to her leg and feet. She is now having bowel and bladder incontinance at times. She states she loses bladder control daily. The first time she lost bowel function was a month ago and has happened 3 times in total.  She has been using Baclofen that she states helps with the spasms and works well with the prednisone. 1.5 weeks ago started with some abdominal pain after eating. She gets bloating and has increased gas. She does not have a Gallbladder and does have chronic diarrhea. No vomiting but does have nausea after eating. Past Medical History:     Past Medical History:   Diagnosis Date    Anxiety     Depression     Family history of thyroid problem     Hypertension     Osteoarthritis       Reviewed all health maintenance requirements and ordered appropriate tests  Health Maintenance Due   Topic Date Due    Colon cancer screen colonoscopy  Never done       Past Surgical History:     Past Surgical History:   Procedure Laterality Date    CHOLECYSTECTOMY          Medications:       Prior to Admission medications    Medication Sig Start Date End Date Taking?  Authorizing Provider   baclofen (LIORESAL) 20 MG tablet Take 1 tablet by mouth 3 times daily 21 Yes REBEKAH Cummings - CNP   meloxicam (MOBIC) 15 MG tablet Take 1 tablet by mouth daily 11/24/21  Yes REBEKAH Olmos CNP   predniSONE (DELTASONE) 20 MG tablet Take 1 tablet by mouth 2 times daily for 5 days 11/24/21 11/29/21 Yes REBEKAH Olmos CNP   predniSONE (DELTASONE) 50 MG tablet Take 1 tablets by mouth once daily for 5 days 11/21/21 12/1/21 Yes Zahraa Jett MD   lisinopril-hydroCHLOROthiazide (PRINZIDE;ZESTORETIC) 10-12.5 MG per tablet Take 1 tablet by mouth daily 5/14/21  Yes REBEKAH Bishop CNP   acetaminophen (TYLENOL) 500 MG tablet Take 500 mg by mouth every 6 hours as needed for Pain  Patient not taking: Reported on 11/24/2021    Historical Provider, MD        Allergies:       Patient has no known allergies. Social History:     Tobacco:    reports that she has been smoking cigarettes. She has been smoking about 1.00 pack per day. She has never used smokeless tobacco.  Alcohol:      reports current alcohol use. Drug Use:  reports previous drug use. Family History:     Family History   Problem Relation Age of Onset    Other Mother     Diabetes Father     Heart Disease Father     Cancer Maternal Grandfather        Review of Systems:     Positive and Negative as described in HPI    Review of Systems   Constitutional: Negative. HENT: Negative. Eyes: Negative. Respiratory: Negative. Cardiovascular: Negative. Gastrointestinal: Positive for abdominal distention, abdominal pain (comes and goes) and nausea (comes and goes). Endocrine: Negative. Genitourinary: Negative. Musculoskeletal: Positive for back pain. Skin: Negative. Allergic/Immunologic: Negative. Neurological: Negative. Hematological: Negative. Psychiatric/Behavioral: Negative. Physical Exam:   Vitals:  /80 (Position: Sitting)   Pulse 88   Temp 97.8 °F (36.6 °C) (Temporal)   Resp 18   Ht 5' (1.524 m)   Wt 231 lb 6.4 oz (105 kg)   LMP 07/27/2020   SpO2 96%   BMI 45.19 kg/m²     Physical Exam  Vitals and nursing note reviewed.    Constitutional: General: She is not in acute distress. Appearance: Normal appearance. HENT:      Head: Normocephalic. Right Ear: External ear normal.      Left Ear: External ear normal.      Nose: Nose normal.      Mouth/Throat:      Mouth: Mucous membranes are moist.   Eyes:      Conjunctiva/sclera: Conjunctivae normal.      Pupils: Pupils are equal, round, and reactive to light. Cardiovascular:      Rate and Rhythm: Normal rate and regular rhythm. Heart sounds: Normal heart sounds. No murmur heard. Pulmonary:      Effort: Pulmonary effort is normal.      Breath sounds: Normal breath sounds. Musculoskeletal:         General: No swelling or tenderness. Normal range of motion. Cervical back: Normal range of motion. Lymphadenopathy:      Cervical: No cervical adenopathy. Skin:     General: Skin is warm. Capillary Refill: Capillary refill takes less than 2 seconds. Neurological:      General: No focal deficit present. Mental Status: She is alert and oriented to person, place, and time. Sensory: No sensory deficit. Motor: No weakness. Coordination: Coordination normal.      Gait: Gait normal.   Psychiatric:         Mood and Affect: Mood normal.         Data:     Lab Results   Component Value Date     10/31/2021    K 4.2 10/31/2021     10/31/2021    CO2 22 10/31/2021    BUN 23 10/31/2021    CREATININE 0.83 10/31/2021    GLUCOSE 104 10/31/2021     Lab Results   Component Value Date    WBC 10.0 10/31/2021    RBC 5.51 10/31/2021    HGB 16.7 10/31/2021    HCT 51.2 10/31/2021    MCV 92.9 10/31/2021    MCH 30.3 10/31/2021    MCHC 32.6 10/31/2021    RDW 14.6 10/31/2021     10/31/2021    MPV 9.4 10/31/2021     Lab Results   Component Value Date    TSH 1.83 05/20/2021     Lab Results   Component Value Date    CHOL 226 05/20/2021    HDL 61 05/20/2021    LABA1C 5.9 05/20/2021       Assessment/Plan:      Diagnosis Orders   1.  Acute right-sided low back pain with right-sided sciatica     2. Bilateral sciatica  MRI PELVIS WO CONTRAST    MRI LUMBAR SPINE WO CONTRAST   3. Lumbosacral radiculopathy  MRI PELVIS WO CONTRAST    MRI LUMBAR SPINE WO CONTRAST     Lumbosacral radiculopathy-Unstable with more frequent bladder incontinence and bowel incontinence. Neurology notified and appointment is scheduled for 12/9/21. MRI is scheduled for patient. Increase Baclofen dose to 20 mg tabs TID. Prednisone prescription given if has an acute attack. If uses she needs to call office and make an appointment. Stop Celebrex and trial Mobic 15 mg daily due to stomach upset. [employer] paperwork brought in and filled out. 1.  Lenore Garg received counseling on the following healthy behaviors: nutrition, exercise and medication adherence  2. Patient given educational materials - see patient instructions  3. Was a self-tracking handout given in paper form or via "[employer]t? No  If yes, see orders or list here. 4.  Discussed use, benefit, and side effects of prescribed medications. Barriers to medication compliance addressed. All patient questions answered. Pt voiced understanding. 5.  Reviewed prior labs and health maintenance  6. Continue current medications, diet and exercise. Completed Refills   Requested Prescriptions     Signed Prescriptions Disp Refills    baclofen (LIORESAL) 20 MG tablet 90 tablet 0     Sig: Take 1 tablet by mouth 3 times daily    meloxicam (MOBIC) 15 MG tablet 30 tablet 3     Sig: Take 1 tablet by mouth daily    predniSONE (DELTASONE) 20 MG tablet 10 tablet 0     Sig: Take 1 tablet by mouth 2 times daily for 5 days         Return in about 1 month (around 12/24/2021).

## 2021-11-26 ENCOUNTER — APPOINTMENT (OUTPATIENT)
Dept: PHYSICAL THERAPY | Age: 51
End: 2021-11-26

## 2021-11-30 ENCOUNTER — APPOINTMENT (OUTPATIENT)
Dept: CT IMAGING | Age: 51
End: 2021-11-30

## 2021-11-30 ENCOUNTER — TELEPHONE (OUTPATIENT)
Dept: PRIMARY CARE CLINIC | Age: 51
End: 2021-11-30

## 2021-11-30 ENCOUNTER — APPOINTMENT (OUTPATIENT)
Dept: MRI IMAGING | Age: 51
End: 2021-11-30

## 2021-11-30 ENCOUNTER — HOSPITAL ENCOUNTER (EMERGENCY)
Age: 51
Discharge: HOME | End: 2021-11-30
Attending: EMERGENCY MEDICINE
Payer: MEDICAID

## 2021-11-30 ENCOUNTER — APPOINTMENT (OUTPATIENT)
Dept: PHYSICAL THERAPY | Age: 51
End: 2021-11-30

## 2021-11-30 VITALS
RESPIRATION RATE: 16 BRPM | TEMPERATURE: 98.6 F | WEIGHT: 220 LBS | OXYGEN SATURATION: 95 % | BODY MASS INDEX: 42.97 KG/M2 | SYSTOLIC BLOOD PRESSURE: 151 MMHG | HEART RATE: 96 BPM | DIASTOLIC BLOOD PRESSURE: 86 MMHG

## 2021-11-30 DIAGNOSIS — R10.13 ABDOMINAL PAIN, EPIGASTRIC: ICD-10-CM

## 2021-11-30 DIAGNOSIS — M54.32 BILATERAL SCIATICA: ICD-10-CM

## 2021-11-30 DIAGNOSIS — M54.31 BILATERAL SCIATICA: ICD-10-CM

## 2021-11-30 DIAGNOSIS — M54.17 LUMBOSACRAL RADICULOPATHY: ICD-10-CM

## 2021-11-30 DIAGNOSIS — M54.40 ACUTE RIGHT-SIDED LOW BACK PAIN WITH SCIATICA, SCIATICA LATERALITY UNSPECIFIED: ICD-10-CM

## 2021-11-30 DIAGNOSIS — R20.0 NUMBNESS: Primary | ICD-10-CM

## 2021-11-30 LAB
-: NORMAL
ABSOLUTE EOS #: <0.03 K/UL (ref 0–0.44)
ABSOLUTE IMMATURE GRANULOCYTE: 0.13 K/UL (ref 0–0.3)
ABSOLUTE LYMPH #: 2.32 K/UL (ref 1.1–3.7)
ABSOLUTE MONO #: 0.69 K/UL (ref 0.1–1.2)
ALBUMIN SERPL-MCNC: 4 G/DL (ref 3.5–5.2)
ALBUMIN/GLOBULIN RATIO: 1.2 (ref 1–2.5)
ALP BLD-CCNC: 95 U/L (ref 35–104)
ALT SERPL-CCNC: 29 U/L (ref 5–33)
AMORPHOUS: NORMAL
ANION GAP SERPL CALCULATED.3IONS-SCNC: 11 MMOL/L (ref 9–17)
AST SERPL-CCNC: 20 U/L
BACTERIA: NORMAL
BASOPHILS # BLD: 1 % (ref 0–2)
BASOPHILS ABSOLUTE: 0.08 K/UL (ref 0–0.2)
BILIRUB SERPL-MCNC: 0.2 MG/DL (ref 0.3–1.2)
BILIRUBIN URINE: NEGATIVE
BUN BLDV-MCNC: 17 MG/DL (ref 6–20)
BUN/CREAT BLD: 26 (ref 9–20)
CALCIUM SERPL-MCNC: 9.9 MG/DL (ref 8.6–10.4)
CASTS UA: NORMAL /LPF
CHLORIDE BLD-SCNC: 102 MMOL/L (ref 98–107)
CO2: 22 MMOL/L (ref 20–31)
COLOR: YELLOW
COMMENT UA: ABNORMAL
CREAT SERPL-MCNC: 0.65 MG/DL (ref 0.5–0.9)
CRYSTALS, UA: NORMAL /HPF
DIFFERENTIAL TYPE: ABNORMAL
EOSINOPHILS RELATIVE PERCENT: 0 % (ref 1–4)
EPITHELIAL CELLS UA: NORMAL /HPF (ref 0–25)
GFR AFRICAN AMERICAN: >60 ML/MIN
GFR NON-AFRICAN AMERICAN: >60 ML/MIN
GFR SERPL CREATININE-BSD FRML MDRD: ABNORMAL ML/MIN/{1.73_M2}
GFR SERPL CREATININE-BSD FRML MDRD: ABNORMAL ML/MIN/{1.73_M2}
GLUCOSE BLD-MCNC: 110 MG/DL (ref 70–99)
GLUCOSE URINE: NEGATIVE
HCT VFR BLD CALC: 49.2 % (ref 36.3–47.1)
HEMOGLOBIN: 16.1 G/DL (ref 11.9–15.1)
IMMATURE GRANULOCYTES: 1 %
KETONES, URINE: NEGATIVE
LEUKOCYTE ESTERASE, URINE: NEGATIVE
LIPASE: 20 U/L (ref 13–60)
LYMPHOCYTES # BLD: 22 % (ref 24–43)
MCH RBC QN AUTO: 30.4 PG (ref 25.2–33.5)
MCHC RBC AUTO-ENTMCNC: 32.7 G/DL (ref 28.4–34.8)
MCV RBC AUTO: 92.8 FL (ref 82.6–102.9)
MONOCYTES # BLD: 7 % (ref 3–12)
MUCUS: NORMAL
NITRITE, URINE: NEGATIVE
NRBC AUTOMATED: 0 PER 100 WBC
OTHER OBSERVATIONS UA: NORMAL
PDW BLD-RTO: 14.4 % (ref 11.8–14.4)
PH UA: 6 (ref 5–9)
PLATELET # BLD: 344 K/UL (ref 138–453)
PLATELET ESTIMATE: ABNORMAL
PMV BLD AUTO: 9.9 FL (ref 8.1–13.5)
POTASSIUM SERPL-SCNC: 4.6 MMOL/L (ref 3.7–5.3)
PROTEIN UA: NEGATIVE
RBC # BLD: 5.3 M/UL (ref 3.95–5.11)
RBC # BLD: ABNORMAL 10*6/UL
RBC UA: NORMAL /HPF (ref 0–2)
RENAL EPITHELIAL, UA: NORMAL /HPF
SEG NEUTROPHILS: 69 % (ref 36–65)
SEGMENTED NEUTROPHILS ABSOLUTE COUNT: 7.19 K/UL (ref 1.5–8.1)
SODIUM BLD-SCNC: 135 MMOL/L (ref 135–144)
SPECIFIC GRAVITY UA: 1.01 (ref 1.01–1.02)
TOTAL PROTEIN: 7.3 G/DL (ref 6.4–8.3)
TRICHOMONAS: NORMAL
TROPONIN INTERP: NORMAL
TROPONIN T: NORMAL NG/ML
TROPONIN, HIGH SENSITIVITY: <6 NG/L (ref 0–14)
TURBIDITY: CLEAR
URINE HGB: ABNORMAL
UROBILINOGEN, URINE: NORMAL
WBC # BLD: 10.4 K/UL (ref 3.5–11.3)
WBC # BLD: ABNORMAL 10*3/UL
WBC UA: NORMAL /HPF (ref 0–5)
YEAST: NORMAL

## 2021-11-30 PROCEDURE — 80053 COMPREHEN METABOLIC PANEL: CPT

## 2021-11-30 PROCEDURE — 2580000003 HC RX 258: Performed by: EMERGENCY MEDICINE

## 2021-11-30 PROCEDURE — 93005 ELECTROCARDIOGRAM TRACING: CPT | Performed by: EMERGENCY MEDICINE

## 2021-11-30 PROCEDURE — 99282 EMERGENCY DEPT VISIT SF MDM: CPT

## 2021-11-30 PROCEDURE — 84484 ASSAY OF TROPONIN QUANT: CPT

## 2021-11-30 PROCEDURE — 74176 CT ABD & PELVIS W/O CONTRAST: CPT

## 2021-11-30 PROCEDURE — 96374 THER/PROPH/DIAG INJ IV PUSH: CPT

## 2021-11-30 PROCEDURE — 6360000002 HC RX W HCPCS: Performed by: EMERGENCY MEDICINE

## 2021-11-30 PROCEDURE — 70450 CT HEAD/BRAIN W/O DYE: CPT

## 2021-11-30 PROCEDURE — 81001 URINALYSIS AUTO W/SCOPE: CPT

## 2021-11-30 PROCEDURE — 83690 ASSAY OF LIPASE: CPT

## 2021-11-30 PROCEDURE — 72148 MRI LUMBAR SPINE W/O DYE: CPT

## 2021-11-30 PROCEDURE — 36415 COLL VENOUS BLD VENIPUNCTURE: CPT

## 2021-11-30 PROCEDURE — 85025 COMPLETE CBC W/AUTO DIFF WBC: CPT

## 2021-11-30 RX ORDER — HYDROCODONE BITARTRATE AND ACETAMINOPHEN 5; 325 MG/1; MG/1
1 TABLET ORAL EVERY 6 HOURS PRN
Qty: 20 TABLET | Refills: 0 | Status: SHIPPED | OUTPATIENT
Start: 2021-11-30 | End: 2021-12-05

## 2021-11-30 RX ORDER — KETOROLAC TROMETHAMINE 30 MG/ML
30 INJECTION, SOLUTION INTRAMUSCULAR; INTRAVENOUS ONCE
Status: COMPLETED | OUTPATIENT
Start: 2021-11-30 | End: 2021-11-30

## 2021-11-30 RX ORDER — 0.9 % SODIUM CHLORIDE 0.9 %
1000 INTRAVENOUS SOLUTION INTRAVENOUS ONCE
Status: COMPLETED | OUTPATIENT
Start: 2021-11-30 | End: 2021-11-30

## 2021-11-30 RX ADMIN — KETOROLAC TROMETHAMINE 30 MG: 30 INJECTION, SOLUTION INTRAMUSCULAR; INTRAVENOUS at 12:54

## 2021-11-30 RX ADMIN — SODIUM CHLORIDE 1000 ML: 9 INJECTION, SOLUTION INTRAVENOUS at 12:53

## 2021-11-30 ASSESSMENT — PAIN DESCRIPTION - LOCATION: LOCATION: ABDOMEN

## 2021-11-30 ASSESSMENT — PAIN DESCRIPTION - ORIENTATION: ORIENTATION: MID

## 2021-11-30 ASSESSMENT — PAIN DESCRIPTION - PAIN TYPE: TYPE: ACUTE PAIN

## 2021-11-30 ASSESSMENT — PAIN SCALES - GENERAL
PAINLEVEL_OUTOF10: 7
PAINLEVEL_OUTOF10: 7

## 2021-11-30 ASSESSMENT — PAIN DESCRIPTION - DESCRIPTORS: DESCRIPTORS: ACHING

## 2021-11-30 NOTE — ED PROVIDER NOTES
[facility] ED  EMERGENCY DEPARTMENT ENCOUNTER      Pt Name: Rosi Alvarado  MRN: [id number]  [place] 1970  Date of evaluation: 11/30/2021  Provider: Sarah Saldana MD    CHIEF COMPLAINT     Chief Complaint   Patient presents with    Abdominal Pain     pain, bloating, swelling, onset x 2 days    Numbness     pt states her right side \"went numb\"  yesterday at noon. Pt states she has a neurology appointment on 12/09 for chronic low back pain          HISTORY OF PRESENT ILLNESS   (Location/Symptom, Timing/Onset, Context/Setting,Quality, Duration, Modifying Factors, Severity)  Note limiting factors. Rosi Alvarado is a53 y.o. female who presents to the emergency department multiple complaints. Her main complaint is numbness and tingling to her right side that began yesterday at noon. She denies weakness. She does have a known history of lower back issues and is scheduled to see a neurologist about her back and have an MRI of her back in the next couple of weeks. Patient has been seen several times over the last 2 to 3 months with this lower back pain. She does report she has been incontinent at times of both bowel and bladder. The last time was about a week ago. She is not been incontinent today. She complains of some numbness and tingling to her right leg. It does not sound like she has had weakness. She complains of tingling around her mouth. Her second main complaint is related to abdominal discomfort. She complains of diffuse abdominal pain associated with bloating. Is been going on for about 2 days. Not associated with nausea vomiting diarrhea and no dysuria but the patient has had some frequency. Patient had just use the bathroom before I saw her and did not collect a sample. HPI    Nursing Notes werereviewed.     REVIEW OF SYSTEMS    (2-9 systems for level 4, 10 or more for level 5)     Review of Systems  Constitutional no documented fevers or chills  Eyes no loss of vision double vision  ENT no headache sore throat or neck pain  Cardiopulmonary no chest pain or palpitations. No shortness of breath or cough  GI as reviewed in HPI   reviewed in HPI  Extremities she is got numbness and tingling to her entire right side including her right arm and leg but no weakness. This sounds like is been going on for more than since just yesterday as she does report she has been told in the past that this was related to her back and that she had a CT of her head about 2 months ago as a work-up for this. Neurologic as reviewed in HPI has had no trouble with her speech. Except as noted above the remainder of the review of systems was reviewed and negative. PAST MEDICAL HISTORY     Past Medical History:   Diagnosis Date    Anxiety     Depression     Family history of thyroid problem     Hypertension     Osteoarthritis          SURGICALHISTORY       Past Surgical History:   Procedure Laterality Date    CHOLECYSTECTOMY           CURRENT MEDICATIONS       Previous Medications    ACETAMINOPHEN (TYLENOL) 500 MG TABLET    Take 500 mg by mouth every 6 hours as needed for Pain    BACLOFEN (LIORESAL) 20 MG TABLET    Take 1 tablet by mouth 3 times daily    LISINOPRIL-HYDROCHLOROTHIAZIDE (PRINZIDE;ZESTORETIC) 10-12.5 MG PER TABLET    Take 1 tablet by mouth daily    MELOXICAM (MOBIC) 15 MG TABLET    Take 1 tablet by mouth daily    PREDNISONE (DELTASONE) 50 MG TABLET    Take 1 tablets by mouth once daily for 5 days            Patient has no known allergies.     FAMILY HISTORY       Family History   Problem Relation Age of Onset    Other Mother     Diabetes Father     Heart Disease Father     Cancer Maternal Grandfather           SOCIAL HISTORY       Social History     Socioeconomic History    Marital status: Legally      Spouse name: None    Number of children: None    Years of education: None    Highest education level: None   Occupational History    None   Tobacco Use    Smoking blood pressure is low elevated at 161/96. She is afebrile other vital signs are stable. She is able to walk but is limping on her right leg. Pupils do react well and are equal.  Extraocular motions are full. No icterus. Pharynx is clear. Tongue and uvula midline. Cranial nerves are intact. Neck is supple. Lungs are clear there is equal breath sounds. Heart is regular rhythm without murmur. She is very tender over her lower back. Her abdomen is obese but soft. She complains of diffuse tenderness to palpation but particular is having some pain in the upper abdomen. Do not really elicit much in the way of tenderness in the lower quadrants. She has good bowel sounds. She has no pronator drift. There is good motor sensory function to both upper extremities. Lower extremity exam demonstrates a negative straight leg raise. She has 1+ reflexes to the knee and ankles. She is able dorsiflex and plantarflex both feet equally. She does have 1+ pedal edema bilaterally without calf tenderness. DIAGNOSTIC RESULTS     EKG: All EKG's are interpreted by the Emergency Department Physician who either signs orCo-signs this chart in the absence of a cardiologist.          RADIOLOGY:   plain film images such as CT, Ultrasound and MRI are read by the radiologist. Plain radiographic images are visualized and preliminarily interpreted by the emergency physician with the below findings:    A punctate nonobstructing stone in the right kidney.       No additional radiopaque urolithiasis on either side.       Normal appendix.       No evidence of bowel obstruction.       No free air or free fluid. 1.  No fracture or bony destructive lesion. 2. Mild central canal stenoses at L4-5 and L5-S1.    3.  Multilevel neural foraminal stenoses, worst (mild-to-moderate) at L4-5         Interpretation per the Radiologist below, ifavailable at the time of this note:    CT Head WO Contrast    (Results Pending)   CT ABDOMEN PELVIS WO CONTRAST Additional Contrast? None    (Results Pending)   MRI LUMBAR SPINE W CONTRAST    (Results Pending)     Negative noncontrast CT examination of the brain    ED BEDSIDE ULTRASOUND:   Performed by ED Physician - none    LABS: Urinalysis is negative. BNP is negative. Glucose 110. Troponin is negative. Liver functions are normal. CBC shows a white count of 10,400 with a normal hemoglobin  Labs Reviewed   CBC WITH AUTO DIFFERENTIAL   COMPREHENSIVE METABOLIC PANEL W/ REFLEX TO MG FOR LOW K   LIPASE   TROPONIN   URINALYSIS       All other labs were within normal range ornot returned as of this dictation. EMERGENCY DEPARTMENT COURSE and DIFFERENTIAL DIAGNOSIS/MDM:   Vitals:    Vitals:    11/30/21 1022   BP: (!) 161/96   Pulse: 96   Resp: 16   Temp: 98.6 °F (37 °C)   TempSrc: Tympanic   SpO2: 95%   Weight: 220 lb (99.8 kg)       This patient presents with lower back pain. She says she talked to her doctor who advised her to come in and because of her incontinence have an MRI of her back of possible. She had one in March as well which I will review. Because of her numbness to her right side I will check a head CT although I do not see evidence of a stroke here. She is quite anxious and I think that is likely a portion of this. Her abdomen does not have a surgical look to it but she is complaining of pain and I will check a CT of her abdomen as well. [facility]     CRITICAL CARE TIME   Patient rechecked at 1:45 PM. She is complaining of lower back pain but based on her MRI does not have anything suggestive of cauda equina and does not have a disc that is obviously causing her symptoms. She does have significant degenerative changes. She has an appointment scheduled to be seen by her neurosurgeon on the ninth. She is still on her steroids at this point. I am going to add some Vicodin that she can use with a total of 20 being dispensed.  There is no evidence here that she has had a stroke or that there is anything in

## 2021-11-30 NOTE — TELEPHONE ENCOUNTER
Patient called c/o abdominal pain and bloating, numbness and tingling. Spoke with Cain Tilley informed patient to go to ER.

## 2021-12-01 LAB
EKG ATRIAL RATE: 87 BPM
EKG P AXIS: 63 DEGREES
EKG P-R INTERVAL: 120 MS
EKG Q-T INTERVAL: 352 MS
EKG QRS DURATION: 76 MS
EKG QTC CALCULATION (BAZETT): 423 MS
EKG R AXIS: 54 DEGREES
EKG T AXIS: 49 DEGREES
EKG VENTRICULAR RATE: 87 BPM

## 2021-12-01 PROCEDURE — 93010 ELECTROCARDIOGRAM REPORT: CPT | Performed by: FAMILY MEDICINE

## 2021-12-09 ENCOUNTER — OFFICE VISIT (OUTPATIENT)
Dept: NEUROLOGY | Age: 51
End: 2021-12-09
Payer: MEDICAID

## 2021-12-09 VITALS
HEIGHT: 60 IN | WEIGHT: 239.1 LBS | TEMPERATURE: 96 F | DIASTOLIC BLOOD PRESSURE: 79 MMHG | SYSTOLIC BLOOD PRESSURE: 144 MMHG | HEART RATE: 107 BPM | RESPIRATION RATE: 18 BRPM | BODY MASS INDEX: 46.94 KG/M2

## 2021-12-09 DIAGNOSIS — M79.604 LEG PAIN, DIFFUSE, RIGHT: Primary | ICD-10-CM

## 2021-12-09 DIAGNOSIS — M62.838 MUSCLE SPASM OF RIGHT LOWER EXTREMITY: ICD-10-CM

## 2021-12-09 PROCEDURE — 99203 OFFICE O/P NEW LOW 30 MIN: CPT | Performed by: NEUROMUSCULOSKELETAL MEDICINE, SPORTS MEDICINE

## 2021-12-09 PROCEDURE — 99213 OFFICE O/P EST LOW 20 MIN: CPT | Performed by: NEUROMUSCULOSKELETAL MEDICINE, SPORTS MEDICINE

## 2021-12-09 RX ORDER — GABAPENTIN 300 MG/1
300 CAPSULE ORAL 2 TIMES DAILY
Qty: 28 CAPSULE | Refills: 1 | Status: SHIPPED | OUTPATIENT
Start: 2021-12-09 | End: 2021-12-23

## 2021-12-09 RX ORDER — HYDROCODONE BITARTRATE AND ACETAMINOPHEN 5; 325 MG/1; MG/1
1 TABLET ORAL EVERY 6 HOURS PRN
COMMUNITY
End: 2021-12-23

## 2021-12-09 NOTE — PATIENT INSTRUCTIONS
SURVEY:    You may be receiving a survey from [employer] regarding your visit today. Please complete the survey to enable us to provide the highest quality of care to you and your family. If you cannot score us a very good on any question, please call the office to discuss how we could have made your experience a very good one. Thank you.

## 2021-12-09 NOTE — PROGRESS NOTES
to work at [address]    Constitutional Weight changes: present, change in appetite: present Fatigue: present; Fevers : absent, Any recent hospitalizations:  absent   HEENT Ears: normal,  Visual disturbance: present   Respiratory Shortness of breath: present, choking:  absent, Cough: present, Snoring : present   Cardiovascular Chest pain: absent, Leg swelling :present, palpitations : present, fainting : absent   GI Constipation: absent, Diarrhea: present, Swallowing change: absent    Urinary frequency: present, Urinary urgency: present, Urinary incontinence: present   Musculoskeletal Neck pain: present, Back pain: present, Stiffness: present, Muscle pain: present, Joint pain: present, restless leg : present   Dermatological Hair loss: absent, Skin changes: absent   Neurological Confusion: present, Trouble concentrating: present, Seizures: absent;  Memory loss: present, balance problem: present, Dizziness: present, vertigo: absent, Weakness: present, Numbness present, Tremor: absent, Spasm: present, involuntary movement: absent, Speech difficulty: absent, Headache: present, Light sensitivity: absent   Psychiatric Anxiety: absent, Depression  present, drug abuse: absent, Hallucination: absent, mood disorder: absent, Suicidal ideations absent   Hematologic Abnormal bleeding: absent, Anemia: absent, Lymph gland changes: absent Clotting disorder: absent     Past Medical History:   Diagnosis Date    Anxiety     Depression     Family history of thyroid problem     Hypertension     Osteoarthritis        Past Surgical History:   Procedure Laterality Date    CHOLECYSTECTOMY         Social History     Socioeconomic History    Marital status: Legally      Spouse name: Not on file    Number of children: Not on file    Years of education: Not on file    Highest education level: Not on file   Occupational History    Not on file   Tobacco Use    Smoking status: Current Every Day Smoker Packs/day: 2.00     Types: Cigarettes    Smokeless tobacco: Never Used   Vaping Use    Vaping Use: Never used   Substance and Sexual Activity    Alcohol use: Yes     Comment: occ    Drug use: Not Currently    Sexual activity: Not on file   Other Topics Concern    Not on file   Social History Narrative    Not on file     Social Determinants of Health     Financial Resource Strain:     Difficulty of Paying Living Expenses: Not on file   Food Insecurity: Unknown    Worried About Running Out of Food in the Last Year: Patient refused   Kurt Jayme in the Last Year: Patient refused   Transportation Needs: Unknown    Lack of Transportation (Medical): Patient refused    Lack of Transportation (Non-Medical): Patient refused   Physical Activity:     Days of Exercise per Week: Not on file   [employer] of Exercise per Session: Not on file   Stress:     Feeling of Stress : Not on file   Social Connections:     Frequency of Communication with Friends and Family: Not on file    Frequency of Social Gatherings with Friends and Family: Not on file    Attends Jehovah's witness Services: Not on file    Active Member of [address] [place] [address] or Organizations: Not on file    Attends Club or Organization Meetings: Not on file    Marital Status: Not on file   Intimate Partner Violence:     Fear of Current or Ex-Partner: Not on file    Emotionally Abused: Not on file    Physically Abused: Not on file    Sexually Abused: Not on file   Housing Stability:     Unable to Pay for Housing in the Last Year: Not on file    Number of [place] in the Last Year: Not on file    Unstable Housing in the Last Year: Not on file       Family History   Problem Relation Age of Onset    Other Mother     Diabetes Father     Heart Disease Father     Cancer Maternal Grandfather        Current Outpatient Medications   Medication Sig Dispense Refill    HYDROcodone-acetaminophen (NORCO) 5-325 MG per tablet Take 1 tablet by mouth every 6 hours as needed for Pain.  gabapentin (NEURONTIN) 300 MG capsule Take 1 capsule by mouth 2 times daily for 14 days. Intended supply: 90 days 28 capsule 1    baclofen (LIORESAL) 20 MG tablet Take 1 tablet by mouth 3 times daily 90 tablet 0    meloxicam (MOBIC) 15 MG tablet Take 1 tablet by mouth daily 30 tablet 3    lisinopril-hydroCHLOROthiazide (PRINZIDE;ZESTORETIC) 10-12.5 MG per tablet Take 1 tablet by mouth daily 30 tablet 5    acetaminophen (TYLENOL) 500 MG tablet Take 500 mg by mouth every 6 hours as needed for Pain (Patient not taking: Reported on 11/24/2021)       No current facility-administered medications for this visit. DATA:  Lab Results   Component Value Date    WBC 10.4 11/30/2021    HGB 16.1 (H) 11/30/2021     11/30/2021    CHOL 226 (H) 05/20/2021    TRIG 147 05/20/2021    HDL 61 05/20/2021    ALT 29 11/30/2021    AST 20 11/30/2021     11/30/2021    K 4.6 11/30/2021     11/30/2021    CREATININE 0.65 11/30/2021    BUN 17 11/30/2021    CO2 22 11/30/2021    TSH 1.83 05/20/2021    LABA1C 5.9 05/20/2021       BP (!) 144/79 (Site: Left Upper Arm, Position: Sitting, Cuff Size: Medium Adult)   Pulse 107   Temp 96 °F (35.6 °C) (Temporal)   Resp 18   Ht 5' (1.524 m)   Wt 239 lb 1.6 oz (108.5 kg)   LMP 07/27/2020   BMI 46.70 kg/m²     NEUROLOGICAL EXAMINATION:     MENTAL STATUS: Patient is alert and oriented x3. [place] CRANIAL NERVES: Pupils are equal and reactive. EOMS are equal in all directions. No nystagmus or any other abnormal eye movements. Facial sensation is normal.  No facial weakness. No hearing loss. Palate and tongue movements are normal.  Shoulder shrug is symmetrical and normal    MOTOR EXAMINATION: Muscle tone is normal in all the limbs. No focal extremity weakness. Strength is 5/5 in both upper and lower limbs. No abnormal limb movements. SENSORY EXAMINATION: Normal.     STRETCH REFLEXES: 1+ and symmetrical in both the upper and lower limbs.       GAIT: No ataxia    IMPRESSION:.  43-year-old already lady with a history of multiple complaints as noted above. Per her history and examination it appears that her symptoms are most likely related to a lumbar radiculopathy or a right hip problem. As she does not wish to try physical therapy again due to worsening of symptoms and also because of insurance issues, I will recommend conservative treatment with medications such as gabapentin for now and see how she does. If there is no improvement he will need further work-up including MRI of the right hip and also possibly an EMG nerve conduction study at some point in time    PLAN:    1.  Gabapentin 300 mg twice daily for 2 weeks. 2.  Continue baclofen for muscle spasms  3. Follow-up in 2 weeks. If symptoms do not improve further work-up will be considered as noted above      NOTE: This neurology evaluation is part of outpatient coverage at [facility]  1-2 days per week. Patients requiring frequent evaluations or uncomfortable with potential 3-4 day turnaround on questions or calls  may be better served by a neurologist in the area full time. [facility]. Kalpana is available for outpatient visits and procedures including EMG/NCS. Non-[facility] system neurologists also practice in [facility], [place] (Dr. Ale Chacko) and [place] (Ev Sage).        Severo Slay, MD   12/9/2021  11:33 AM

## 2021-12-23 ENCOUNTER — OFFICE VISIT (OUTPATIENT)
Dept: NEUROLOGY | Age: 51
End: 2021-12-23
Payer: MEDICAID

## 2021-12-23 VITALS
SYSTOLIC BLOOD PRESSURE: 122 MMHG | HEIGHT: 60 IN | RESPIRATION RATE: 18 BRPM | WEIGHT: 238.2 LBS | HEART RATE: 101 BPM | DIASTOLIC BLOOD PRESSURE: 73 MMHG | TEMPERATURE: 96.8 F | BODY MASS INDEX: 46.77 KG/M2

## 2021-12-23 DIAGNOSIS — M79.604 LEG PAIN, DIFFUSE, RIGHT: Primary | ICD-10-CM

## 2021-12-23 PROCEDURE — 99213 OFFICE O/P EST LOW 20 MIN: CPT | Performed by: NEUROMUSCULOSKELETAL MEDICINE, SPORTS MEDICINE

## 2021-12-23 RX ORDER — GABAPENTIN 300 MG/1
300 CAPSULE ORAL 3 TIMES DAILY
Qty: 90 CAPSULE | Refills: 1 | Status: SHIPPED | OUTPATIENT
Start: 2021-12-23 | End: 2022-02-17 | Stop reason: SDUPTHER

## 2021-12-23 RX ORDER — BACLOFEN 20 MG/1
20 TABLET ORAL 3 TIMES DAILY
Qty: 90 TABLET | Refills: 2 | Status: SHIPPED | OUTPATIENT
Start: 2021-12-23 | End: 2022-01-22

## 2021-12-23 RX ORDER — TRAMADOL HYDROCHLORIDE 50 MG/1
50 TABLET ORAL 2 TIMES DAILY
Qty: 28 TABLET | Refills: 0 | Status: SHIPPED | OUTPATIENT
Start: 2021-12-23 | End: 2022-01-06

## 2021-12-23 RX ORDER — METHYLPREDNISOLONE 4 MG/1
TABLET ORAL
Qty: 21 TABLET | Refills: 0 | Status: SHIPPED | OUTPATIENT
Start: 2021-12-23 | End: 2021-12-29

## 2021-12-23 NOTE — PROGRESS NOTES
NEUROLOGY Follow up    Patient Name:  Henry Monson  :   1970  Clinic Visit Date: 2021    I saw Ms. Henry Monson  in the neurology clinic today for follow-up of persistent severe right-sided lower limb pain and muscle spasms in her back with numbness and tingling in the mid back region. She has been having persistent pain muscle spasms in her mid and lower back which is worse at night. Unable to sleep at night and has symptoms persistently throughout the day. Also unable to ambulate short distances due to pain. She is currently on gabapentin 300 mg twice daily with transient improvement. She has lost her job at [employer] because of inability to work due to the persistent symptoms as noted. Baclofen has been helping to some extent the muscle spasms. REVIEW OF SYSTEMS    Constitutional Weight changes: present, change in appetite: present Fatigue: present; Fevers : absent, Any recent hospitalizations:  absent   HEENT Ears: normal,  Visual disturbance: present   Respiratory Shortness of breath: present, choking:  absent, Cough: present, Snoring : absent   Cardiovascular Chest pain: absent, Leg swelling :present, palpitations : absent, fainting : absent   GI Constipation: absent, Diarrhea: present, Swallowing change: absent    Urinary frequency: present, Urinary urgency: present, Urinary incontinence: present   Musculoskeletal Neck pain: present, Back pain: present, Stiffness: present, Muscle pain: present, Joint pain: present, restless leg : present   Dermatological Hair loss: absent, Skin changes: absent   Neurological Confusion: present, Trouble concentrating: present, Seizures: absent;  Memory loss: present, balance problem: present, Dizziness: present, vertigo: absent, Weakness: present, Numbness absent, Tremor: absent, Spasm: present, involuntary movement: absent, Speech difficulty: absent, Headache: present, Light sensitivity: absent   Psychiatric Anxiety: present, Depression  present, Organization Meetings: Not on file    Marital Status: Not on file   Intimate Partner Violence:     Fear of Current or Ex-Partner: Not on file    Emotionally Abused: Not on file    Physically Abused: Not on file    Sexually Abused: Not on file   Housing Stability:     Unable to Pay for Housing in the Last Year: Not on file    Number of [place] in the Last Year: Not on file    Unstable Housing in the Last Year: Not on file       Family History   Problem Relation Age of Onset    Other Mother     Diabetes Father     Heart Disease Father     Cancer Maternal Grandfather        Current Outpatient Medications   Medication Sig Dispense Refill    methylPREDNISolone (MEDROL DOSEPACK) 4 MG tablet Take by mouth. 21 tablet 0    gabapentin (NEURONTIN) 300 MG capsule Take 1 capsule by mouth 3 times daily for 30 days. Intended supply: 90 days 90 capsule 1    baclofen (LIORESAL) 20 MG tablet Take 1 tablet by mouth 3 times daily 90 tablet 2    traMADol (ULTRAM) 50 MG tablet Take 1 tablet by mouth 2 times daily for 14 days. Intended supply: 7 days. Take lowest dose possible to manage pain 28 tablet 0    meloxicam (MOBIC) 15 MG tablet Take 1 tablet by mouth daily 30 tablet 3    acetaminophen (TYLENOL) 500 MG tablet Take 500 mg by mouth every 6 hours as needed for Pain       lisinopril-hydroCHLOROthiazide (PRINZIDE;ZESTORETIC) 10-12.5 MG per tablet Take 1 tablet by mouth daily 30 tablet 5     No current facility-administered medications for this visit.       DATA:  Lab Results   Component Value Date    WBC 10.4 11/30/2021    HGB 16.1 (H) 11/30/2021     11/30/2021    CHOL 226 (H) 05/20/2021    TRIG 147 05/20/2021    HDL 61 05/20/2021    ALT 29 11/30/2021    AST 20 11/30/2021     11/30/2021    K 4.6 11/30/2021     11/30/2021    CREATININE 0.65 11/30/2021    BUN 17 11/30/2021    CO2 22 11/30/2021    TSH 1.83 05/20/2021    LABA1C 5.9 05/20/2021       /73 (Site: Right Upper Arm, Position: Sitting, Cuff Size: Medium Adult)   Pulse 101   Temp 96.8 °F (36 °C) (Temporal)   Resp 18   Ht 5' (1.524 m)   Wt 238 lb 3.2 oz (108 kg)   LMP 07/27/2020   BMI 46.52 kg/m²     NEUROLOGICAL EXAMINATION:     MENTAL STATUS:.  Normal    CRANIAL NERVES: Pupils are equal and reactive. EOMS are equal in all directions. No nystagmus or any other abnormal eye movements. Facial sensation is normal.  No facial asymmetry. Hearing is normal.  Palate and tongue movements are normal.  Shoulder shrug is symmetrical and normal    MOTOR EXAMINATION: Muscle tone is normal in all the limbs. Muscle strength is 5/5 in both upper and lower limbs. No abnormal limb movements. SENSORY EXAMINATION: Normal.     STRETCH REFLEXES: Symmetrical in both the upper and lower limbs. GAIT:.  Antalgic gait. IMPRESSION:.  Severe back pain and muscle spasms and pain in the right lower limb. Multifactorial causes including right hip pain and lower back pain secondary to underlying degenerative joint disease. No improvement with physical therapy    PLAN:    1. Medrol Dosepak x1.  2.  Continue baclofen 20 mg  ,3 times daily. 3.  Increase the dose of gabapentin to 300 mg 3 times daily. 4.  Tramadol 50 mg twice daily x2 weeks. 5.  Follow-up in 3-weeks    NOTE: This neurology evaluation is part of outpatient coverage at [facility]  1-2 days per week. Patients requiring frequent evaluations or uncomfortable with potential 3-4 day turnaround on questions or calls  may be better served by a neurologist in the area full time. [facility]. Kalpana is available for outpatient visits and procedures including EMG/NCS. Non-[facility] system neurologists also practice in [facility] (Dr. Jen Hernandez) and [facility] (Ev Moise).        Betty Avendano MD   12/23/2021  12:26 PM

## 2021-12-23 NOTE — PATIENT INSTRUCTIONS
SURVEY:    You may be receiving a survey from [employer] regarding your visit today. Please complete the survey to enable us to provide the highest quality of care to you and your family. If you cannot score us a very good on any question, please call the office to discuss how we could have made your experience a very good one. Thank you.

## 2022-01-11 DIAGNOSIS — I10 ESSENTIAL HYPERTENSION: ICD-10-CM

## 2022-01-11 RX ORDER — LISINOPRIL AND HYDROCHLOROTHIAZIDE 12.5; 1 MG/1; MG/1
1 TABLET ORAL DAILY
Qty: 30 TABLET | Refills: 5 | Status: SHIPPED | OUTPATIENT
Start: 2022-01-11 | End: 2022-06-08 | Stop reason: ALTCHOICE

## 2022-01-11 NOTE — TELEPHONE ENCOUNTER
Health Maintenance   Topic Date Due    Colon cancer screen colonoscopy  Never done    DTaP/Tdap/Td vaccine (1 - Tdap) 05/14/2022 (Originally 11/17/1991)    Pneumococcal 0-64 years Vaccine (1 of 2 - PPSV23) 05/14/2022 (Originally 11/17/1978)    Hepatitis C screen  05/14/2022 (Originally 1970)    HIV screen  05/14/2022 (Originally 11/17/1987)    Flu vaccine (1) 11/24/2022 (Originally 9/1/2021)    Cervical cancer screen  11/24/2022 (Originally 11/17/1993)    COVID-19 Vaccine (3 - Booster for Pfizer series) 04/04/2022    Depression Monitoring  05/19/2022    A1C test (Diabetic or Prediabetic)  05/20/2022    Potassium monitoring  11/30/2022    Creatinine monitoring  11/30/2022    Lipid screen  05/20/2026    Hepatitis A vaccine  Aged Out    Hepatitis B vaccine  Aged Out    Hib vaccine  Aged Out    Meningococcal (ACWY) vaccine  Aged Out             (applicable per patient's age: Cancer Screenings, Depression Screening, Fall Risk Screening, Immunizations)    Hemoglobin A1C (%)   Date Value   05/20/2021 5.9   05/14/2021 5.6     LDL Cholesterol (mg/dL)   Date Value   05/20/2021 136 (H)     AST (U/L)   Date Value   11/30/2021 20     ALT (U/L)   Date Value   11/30/2021 29     BUN (mg/dL)   Date Value   11/30/2021 17      (goal A1C is < 7)   (goal LDL is <100) need 30-50% reduction from baseline     BP Readings from Last 3 Encounters:   12/23/21 122/73   12/09/21 (!) 144/79   11/30/21 (!) 151/86    (goal /80)      All Future Testing planned in [facility]:  Lab Frequency Next Occurrence   Full PFT Study With Bronchodilator Once 05/26/2021   CBC Auto Differential Once 06/16/2021   MRI PELVIS WO CONTRAST Once 11/24/2021       Next Visit Date:  Future Appointments   Date Time Provider Pao Elias   1/13/2022  4:20 PM Elsy Calle MD [facility]            Patient Active Problem List:     Major depressive disorder, recurrent episode with anxious distress ([address]

## 2022-01-13 ENCOUNTER — OFFICE VISIT (OUTPATIENT)
Dept: NEUROLOGY | Age: 52
End: 2022-01-13
Payer: MEDICAID

## 2022-01-13 ENCOUNTER — OFFICE VISIT (OUTPATIENT)
Dept: PRIMARY CARE CLINIC | Age: 52
End: 2022-01-13
Payer: MEDICAID

## 2022-01-13 VITALS
WEIGHT: 243.8 LBS | RESPIRATION RATE: 18 BRPM | HEART RATE: 115 BPM | DIASTOLIC BLOOD PRESSURE: 68 MMHG | TEMPERATURE: 97.2 F | HEIGHT: 60 IN | BODY MASS INDEX: 47.87 KG/M2 | SYSTOLIC BLOOD PRESSURE: 118 MMHG

## 2022-01-13 VITALS
OXYGEN SATURATION: 93 % | RESPIRATION RATE: 18 BRPM | TEMPERATURE: 97.9 F | SYSTOLIC BLOOD PRESSURE: 135 MMHG | BODY MASS INDEX: 48.24 KG/M2 | WEIGHT: 247 LBS | DIASTOLIC BLOOD PRESSURE: 73 MMHG | HEART RATE: 76 BPM

## 2022-01-13 DIAGNOSIS — F34.1 DYSTHYMIA: ICD-10-CM

## 2022-01-13 DIAGNOSIS — Z12.11 COLON CANCER SCREENING: ICD-10-CM

## 2022-01-13 DIAGNOSIS — M54.17 LUMBOSACRAL RADICULOPATHY: Primary | ICD-10-CM

## 2022-01-13 DIAGNOSIS — I10 ESSENTIAL HYPERTENSION: ICD-10-CM

## 2022-01-13 DIAGNOSIS — G89.29 CHRONIC BILATERAL LOW BACK PAIN WITH SCIATICA, SCIATICA LATERALITY UNSPECIFIED: Primary | ICD-10-CM

## 2022-01-13 DIAGNOSIS — M54.41 ACUTE RIGHT-SIDED LOW BACK PAIN WITH RIGHT-SIDED SCIATICA: ICD-10-CM

## 2022-01-13 DIAGNOSIS — M54.40 CHRONIC BILATERAL LOW BACK PAIN WITH SCIATICA, SCIATICA LATERALITY UNSPECIFIED: Primary | ICD-10-CM

## 2022-01-13 DIAGNOSIS — F31.9 BIPOLAR AFFECTIVE DISORDER, REMISSION STATUS UNSPECIFIED (HCC): ICD-10-CM

## 2022-01-13 DIAGNOSIS — M79.604 LEG PAIN, DIFFUSE, RIGHT: ICD-10-CM

## 2022-01-13 PROCEDURE — 99214 OFFICE O/P EST MOD 30 MIN: CPT | Performed by: NURSE PRACTITIONER

## 2022-01-13 PROCEDURE — 99213 OFFICE O/P EST LOW 20 MIN: CPT | Performed by: NEUROMUSCULOSKELETAL MEDICINE, SPORTS MEDICINE

## 2022-01-13 RX ORDER — ARIPIPRAZOLE 15 MG/1
15 TABLET ORAL DAILY
Qty: 30 TABLET | Refills: 1 | Status: SHIPPED | OUTPATIENT
Start: 2022-01-13 | End: 2022-03-11

## 2022-01-13 RX ORDER — TRAMADOL HYDROCHLORIDE 50 MG/1
50 TABLET ORAL 2 TIMES DAILY
Qty: 2014 TABLET | Refills: 1 | Status: SHIPPED | OUTPATIENT
Start: 2022-01-13 | End: 2022-01-27

## 2022-01-13 ASSESSMENT — ENCOUNTER SYMPTOMS
EYES NEGATIVE: 1
ALLERGIC/IMMUNOLOGIC NEGATIVE: 1
BACK PAIN: 1
GASTROINTESTINAL NEGATIVE: 1
RESPIRATORY NEGATIVE: 1

## 2022-01-13 NOTE — PROGRESS NOTES
[facility]  Jaclyn Velazquez, [address] [facility] PRIMARY CARE  [address]  Dept: [phone]  Dept Fax: [phone]      Name: Milly Colin  : 1970         Chief Complaint:     Chief Complaint   Patient presents with    Back Pain     1 month check up. Patient sees Neuro today. Not feeling any better. History of Present Illness:      Milly Colin is a 54 y.o.  female who presents with Back Pain (1 month check up. Patient sees Neuro today. Not feeling any better. )    Shannon Falk is here today for a 1 month follow up for back pain. She states she has had no improvement in her symptoms. She is unable to stand for more than 15 minutes. She is having muscle spasms. She states she is unable to work due to the pain. She is unable to sleep due to the pain. She is seeing Neurology and has a follow up appointment today. Neurology had given her a medrol dose pack, increased the gabapentin and Tramadol for pain. She states the medrol dose pack helped for a short time but she has been gaining weight. She has been using the Tramadol at night to be able to sleep for a couple hours. She states she is using the Baclofen TID and continues to have spasms. She states the other day she tried to stand up and had no sensation in her legs. She denies numbness and tingling in the legs she just had no feeling at all in them and fell when tried to stand. Meredith Kline states she has applied for disability and has no insurance at this time. She states she ran out of lisinopril and has had swelling in her feet and hands. She resumed lisinopril 2 days ago and has had decreased swelling. Meredith Kline states she has been diagnosed with Bipolar disease and depression in the past.  She states she had gotten off of her medications due to cost and not having insurance. She had been seeing a psychiatrist in [place]. She states she was taking Abilify 30 mg and Depakote (unsure of dose). She states this chronic pain and losing her job has made her depression worse. Rodney Damon also states she has bone spurs on her feet that she has never been able to have surgery on because of insurance. She also states she has carpal tunnel and when she has insurance she wants a referral for both to be taken care of. Past Medical History:     Past Medical History:   Diagnosis Date    Anxiety     Depression     Family history of thyroid problem     Hypertension     Osteoarthritis       Reviewed all health maintenance requirements and ordered appropriate tests  Health Maintenance Due   Topic Date Due    Colon cancer screen colonoscopy  Never done       Past Surgical History:     Past Surgical History:   Procedure Laterality Date    CHOLECYSTECTOMY          Medications:       Prior to Admission medications    Medication Sig Start Date End Date Taking? Authorizing Provider   ARIPiprazole (ABILIFY) 15 MG tablet Take 1 tablet by mouth daily 1/13/22 2/12/22 Yes REBEKAH Eller CNP   lisinopril-hydroCHLOROthiazide (PRINZIDE;ZESTORETIC) 10-12.5 MG per tablet Take 1 tablet by mouth daily 1/11/22  Yes REBEKAH Eller CNP   gabapentin (NEURONTIN) 300 MG capsule Take 1 capsule by mouth 3 times daily for 30 days. Intended supply: 90 days 12/23/21 1/22/22 Yes Kristin Hernandez MD   baclofen (LIORESAL) 20 MG tablet Take 1 tablet by mouth 3 times daily 12/23/21 1/22/22 Yes Kristin Hernandez MD   meloxicam (MOBIC) 15 MG tablet Take 1 tablet by mouth daily 11/24/21  Yes REBEKAH Eller CNP   acetaminophen (TYLENOL) 500 MG tablet Take 500 mg by mouth every 6 hours as needed for Pain    Yes Historical Provider, MD        Allergies:       Patient has no known allergies. Social History:     Tobacco:    reports that she has been smoking cigarettes. She has been smoking about 2.00 packs per day. She has never used smokeless tobacco.  Alcohol:      reports current alcohol use.   Drug Use: reports previous drug use. Family History:     Family History   Problem Relation Age of Onset    Other Mother     Diabetes Father     Heart Disease Father     Cancer Maternal Grandfather        Review of Systems:     Positive and Negative as described in HPI    Review of Systems   Constitutional: Negative. HENT: Negative. Eyes: Negative. Respiratory: Negative. Cardiovascular: Negative. Gastrointestinal: Negative. Endocrine: Negative. Genitourinary: Negative. Musculoskeletal: Positive for arthralgias, back pain, gait problem and myalgias. Skin: Negative. Allergic/Immunologic: Negative. Hematological: Negative. Psychiatric/Behavioral: Negative. Physical Exam:   Vitals:  /73 (Site: Right Upper Arm, Position: Sitting, Cuff Size: Large Adult)   Pulse 76   Temp 97.9 °F (36.6 °C) (Temporal)   Resp 18   Wt 247 lb (112 kg)   LMP 07/27/2020   SpO2 93%   BMI 48.24 kg/m²     Physical Exam  Vitals and nursing note reviewed. Constitutional:       Appearance: Normal appearance. HENT:      Head: Normocephalic. Right Ear: External ear normal.      Left Ear: External ear normal.      Nose: Nose normal.      Mouth/Throat:      Mouth: Mucous membranes are moist.      Pharynx: Oropharynx is clear. Eyes:      Pupils: Pupils are equal, round, and reactive to light. Cardiovascular:      Rate and Rhythm: Normal rate and regular rhythm. Heart sounds: Normal heart sounds. Pulmonary:      Effort: Pulmonary effort is normal.      Breath sounds: Normal breath sounds. Musculoskeletal:      Cervical back: Normal and normal range of motion. Thoracic back: Tenderness present. Lumbar back: Tenderness present. Skin:     General: Skin is warm. Capillary Refill: Capillary refill takes less than 2 seconds. Neurological:      General: No focal deficit present. Mental Status: She is alert and oriented to person, place, and time.    Psychiatric: Mood and Affect: Mood normal.         Data:     Lab Results   Component Value Date     11/30/2021    K 4.6 11/30/2021     11/30/2021    CO2 22 11/30/2021    BUN 17 11/30/2021    CREATININE 0.65 11/30/2021    GLUCOSE 110 11/30/2021    PROT 7.3 11/30/2021    LABALBU 4.0 11/30/2021    BILITOT 0.20 11/30/2021    ALKPHOS 95 11/30/2021    AST 20 11/30/2021    ALT 29 11/30/2021     Lab Results   Component Value Date    WBC 10.4 11/30/2021    RBC 5.30 11/30/2021    HGB 16.1 11/30/2021    HCT 49.2 11/30/2021    MCV 92.8 11/30/2021    MCH 30.4 11/30/2021    MCHC 32.7 11/30/2021    RDW 14.4 11/30/2021     11/30/2021    MPV 9.9 11/30/2021     Lab Results   Component Value Date    TSH 1.83 05/20/2021     Lab Results   Component Value Date    CHOL 226 05/20/2021    HDL 61 05/20/2021    LABA1C 5.9 05/20/2021       Assessment/Plan:      Diagnosis Orders   1. Lumbosacral radiculopathy     2. Acute right-sided low back pain with right-sided sciatica     3. Essential hypertension     4. Dysthymia     5. Bipolar affective disorder, remission status unspecified ([facility] [address]     6. Colon cancer screening  POCT Fecal Immunochemical Test (FIT)     Lumbosacral Radiculopathy-Continue follow up with Neurology. Has appointment today with Dr. Ana Paula Cooper at 3:00. Continue with prescribed medications. Dysthymia-Will start Abilify at 15 mg daily. Pt. To call in after 30 days and will continue at 15 mg a day or increase to 30 mg to put her at past dose. Essential Hypertension-Stable. Continue Lisinopril daily. Patient to let me know when she would like to further evaluate and treat her carpal tunnel and bone spurs. 1.  Ruben Mccollum received counseling on the following healthy behaviors: nutrition, exercise and medication adherence  2. Patient given educational materials - see patient instructions  3. Was a self-tracking handout given in paper form or via [employer]? No  If yes, see orders or list here.   4.  Discussed use, benefit, and side effects of prescribed medications. Barriers to medication compliance addressed. All patient questions answered. Pt voiced understanding. 5.  Reviewed prior labs and health maintenance  6. Continue current medications, diet and exercise. Completed Refills   Requested Prescriptions     Signed Prescriptions Disp Refills    ARIPiprazole (ABILIFY) 15 MG tablet 30 tablet 1     Sig: Take 1 tablet by mouth daily         Return in about 3 months (around 4/13/2022).

## 2022-01-13 NOTE — PATIENT INSTRUCTIONS
SURVEY:    You may be receiving a survey from [employer] regarding your visit today. Please complete the survey to enable us to provide the highest quality of care to you and your family. If you cannot score us a very good on any question, please call the office to discuss how we could have made your experience a very good one. Thank you.

## 2022-01-13 NOTE — PATIENT INSTRUCTIONS
SURVEY:     You may be receiving a survey from [employer] regarding your visit today. Please complete the survey to enable us to provide the highest quality of care to you and your family. If you cannot score us a very good on any question, please call the office to discuss how we could have made your experience a very good one. Thank you.   Jennifer Rothman, APRN-ANDREW Duenas, ANDREW Shelton, LPN  [facility], LPN  Fay Sykes, CMA  Greg Ervin, YOLANDA Botello, CMA  Cookie, PCA

## 2022-01-13 NOTE — PROGRESS NOTES
NEUROLOGY Follow up    Patient Name:  Michelle Rodriguez  :   1970  Clinic Visit Date: 2022    I saw Ms. Michelle Rodriguez  in the neurology clinic today for follow-up of severe lower back pain and pain and muscle has in the lower extremities. She continues to have severe intermittent lower back pain and pain in the lower extremities in spite of being on gabapentin and the  increased dose of baclofen. However, she has been feeling a little bit better than before. Takes tramadol 50 mg as needed with some relief at night. No weakness in the lower extremities or bladder symptoms. MRI lumbar spine demonstrated mild degenerative disease at multiple levels. No lumbar spinal stenosis . REVIEW OF SYSTEMS    Constitutional Weight changes: present, change in appetite: present Fatigue: present; Fevers : absent, Any recent hospitalizations:  absent   HEENT Ears: normal,  Visual disturbance: present   Respiratory Shortness of breath: present, choking:  absent, Cough: present, Snoring : present   Cardiovascular Chest pain: present, Leg swelling :present, palpitations : absent, fainting : absent   GI Constipation: absent, Diarrhea: present, Swallowing change: present    Urinary frequency: present, Urinary urgency: present, Urinary incontinence: absent   Musculoskeletal Neck pain: present, Back pain: present, Stiffness: present, Muscle pain: present, Joint pain: present, restless leg : present   Dermatological Hair loss: absent, Skin changes: absent   Neurological Confusion: present, Trouble concentrating: present, Seizures: absent;  Memory loss: present, balance problem: absent, Dizziness: present, vertigo: absent, Weakness: present, Numbness present, Tremor: absent, Spasm: present, involuntary movement: absent, Speech difficulty: present, Headache: present, Light sensitivity: absent   Psychiatric Anxiety: present, Depression  present, drug abuse: absent, Hallucination: absent, mood disorder: present, Suicidal Partner Violence:     Fear of Current or Ex-Partner: Not on file    Emotionally Abused: Not on file    Physically Abused: Not on file    Sexually Abused: Not on file   Housing Stability:     Unable to Pay for Housing in the Last Year: Not on file    Number of [place] in the Last Year: Not on file    Unstable Housing in the Last Year: Not on file       Family History   Problem Relation Age of Onset    Other Mother     Diabetes Father     Heart Disease Father     Cancer Maternal Grandfather        Current Outpatient Medications   Medication Sig Dispense Refill    ARIPiprazole (ABILIFY) 15 MG tablet Take 1 tablet by mouth daily 30 tablet 1    traMADol (ULTRAM) 50 MG tablet Take 1 tablet by mouth 2 times daily for 14 days. Intended supply: 5 days. Take lowest dose possible to manage pain 2014 tablet 1    lisinopril-hydroCHLOROthiazide (PRINZIDE;ZESTORETIC) 10-12.5 MG per tablet Take 1 tablet by mouth daily 30 tablet 5    gabapentin (NEURONTIN) 300 MG capsule Take 1 capsule by mouth 3 times daily for 30 days. Intended supply: 90 days 90 capsule 1    baclofen (LIORESAL) 20 MG tablet Take 1 tablet by mouth 3 times daily 90 tablet 2    meloxicam (MOBIC) 15 MG tablet Take 1 tablet by mouth daily 30 tablet 3    acetaminophen (TYLENOL) 500 MG tablet Take 500 mg by mouth every 6 hours as needed for Pain        No current facility-administered medications for this visit.       DATA:  Lab Results   Component Value Date    WBC 10.4 11/30/2021    HGB 16.1 (H) 11/30/2021     11/30/2021    CHOL 226 (H) 05/20/2021    TRIG 147 05/20/2021    HDL 61 05/20/2021    ALT 29 11/30/2021    AST 20 11/30/2021     11/30/2021    K 4.6 11/30/2021     11/30/2021    CREATININE 0.65 11/30/2021    BUN 17 11/30/2021    CO2 22 11/30/2021    TSH 1.83 05/20/2021    LABA1C 5.9 05/20/2021       /68 (Site: Left Upper Arm, Position: Sitting, Cuff Size: Large Adult)   Pulse 115   Temp 97.2 °F (36.2 °C) (Temporal) Resp 18   Ht 5' (1.524 m)   Wt 243 lb 12.8 oz (110.6 kg)   LMP 07/27/2020   BMI 47.61 kg/m²     NEUROLOGICAL EXAMINATION:     MENTAL STATUS:.  Normal    CRANIAL NERVES:II-XII are unremarkable. Hard of hearing? MOTOR EXAMINATION:Strength is 5/5 in both upper and lower limbs. SENSORY EXAMINATION: Normal.     STRETCH REFLEXES: Symmetrical in both the upper and lower limbs. GAIT:.  Antalgic gait. IMPRESSION:.  Severe lower back pain, with pain in the lower extremities. Some improvement with Medrol dose pack, baclofen ,gabapentin and tramadol as needed    PLAN:    1. Continue baclofen 10 mg twice daily. 2.  Tramadol 50 twice daily or once a day as needed. 3   Gabapentin 300 mg 3 times daily  4. Physical therapy at [facility]  5. -Follow-up in 1 month        NOTE: This neurology evaluation is part of outpatient coverage at [facility]  1-2 days per week. Patients requiring frequent evaluations or uncomfortable with potential 3-4 day turnaround on questions or calls  may be better served by a neurologist in the area full time. [facility] at [facility]. Cj/Antelmo is available for outpatient visits and procedures including EMG/NCS. Non-[facility] system neurologists also practice in [facility] (Dr. Ha Villegas) and [place] [facility] (Ev Puente).        Elsy Calle MD   1/13/2022  3:58 PM

## 2022-01-24 ENCOUNTER — TELEPHONE (OUTPATIENT)
Dept: PRIMARY CARE CLINIC | Age: 52
End: 2022-01-24

## 2022-01-24 NOTE — TELEPHONE ENCOUNTER
Deonna Hunt called as she has lower leg swelling for over a week. Her toes are bluish. Keeping them elevated has not helped. Asked Tulio Isidro to prescribe lasix as she has used oral and IV lasix in the past.    Writer spoke to VALDO Fields, told to advise pt to go to ED as with bluish toes, IV lasix may be needed. Information given to pt, understood.

## 2022-02-07 ENCOUNTER — TELEPHONE (OUTPATIENT)
Dept: PRIMARY CARE CLINIC | Age: 52
End: 2022-02-07

## 2022-02-15 ENCOUNTER — TELEPHONE (OUTPATIENT)
Dept: PRIMARY CARE CLINIC | Age: 52
End: 2022-02-15

## 2022-02-15 DIAGNOSIS — R19.5 POSITIVE FIT (FECAL IMMUNOCHEMICAL TEST): Primary | ICD-10-CM

## 2022-02-15 DIAGNOSIS — Z12.11 COLON CANCER SCREENING: ICD-10-CM

## 2022-02-15 LAB
CONTROL: PRESENT
HEMOCCULT STL QL: POSITIVE

## 2022-02-15 PROCEDURE — 82274 ASSAY TEST FOR BLOOD FECAL: CPT | Performed by: NURSE PRACTITIONER

## 2022-02-15 NOTE — TELEPHONE ENCOUNTER
Contacted patient in regards to positive FIT card. Patient verbalized understanding. Informed referral was placed to general surgery.

## 2022-02-15 NOTE — TELEPHONE ENCOUNTER
She needs to be back on medication for a month to see results. She was told to go to ER a few weeks ago for swelling that was turning her toes blue. Inda Brittle Inda Brittle I dont' see that she went? She will need an appointment if she feels that medication is not working and having swelling if she has been back on it for a month.

## 2022-02-15 NOTE — TELEPHONE ENCOUNTER
----- Message from REBEKAH Galicia CNP sent at 2/15/2022  4:55 PM EST -----  Please notify patient of abnormal FIT test results. Referral placed for surgery for colonoscopy.   Thanks Rox's

## 2022-02-16 ENCOUNTER — TELEPHONE (OUTPATIENT)
Dept: SURGERY | Age: 52
End: 2022-02-16

## 2022-02-16 DIAGNOSIS — R19.5 POSITIVE FIT (FECAL IMMUNOCHEMICAL TEST): Primary | ICD-10-CM

## 2022-02-16 NOTE — TELEPHONE ENCOUNTER
Spoke to Gino, informed of your message. She does not want to go to ED. \"All they do is hook me up to a Lasix drip and then send me home with a 7 day supply of Lasix\"  She did not schedule an appt to be seen.

## 2022-02-17 ENCOUNTER — OFFICE VISIT (OUTPATIENT)
Dept: NEUROLOGY | Age: 52
End: 2022-02-17
Payer: MEDICAID

## 2022-02-17 VITALS
SYSTOLIC BLOOD PRESSURE: 127 MMHG | HEIGHT: 60 IN | DIASTOLIC BLOOD PRESSURE: 69 MMHG | RESPIRATION RATE: 18 BRPM | TEMPERATURE: 97.6 F | WEIGHT: 262.2 LBS | BODY MASS INDEX: 51.48 KG/M2 | HEART RATE: 100 BPM

## 2022-02-17 DIAGNOSIS — M54.40 CHRONIC BILATERAL LOW BACK PAIN WITH SCIATICA, SCIATICA LATERALITY UNSPECIFIED: Primary | ICD-10-CM

## 2022-02-17 DIAGNOSIS — G89.29 CHRONIC BILATERAL LOW BACK PAIN WITH SCIATICA, SCIATICA LATERALITY UNSPECIFIED: Primary | ICD-10-CM

## 2022-02-17 PROCEDURE — 99213 OFFICE O/P EST LOW 20 MIN: CPT

## 2022-02-17 PROCEDURE — 99213 OFFICE O/P EST LOW 20 MIN: CPT | Performed by: NEUROMUSCULOSKELETAL MEDICINE, SPORTS MEDICINE

## 2022-02-17 PROCEDURE — 99214 OFFICE O/P EST MOD 30 MIN: CPT

## 2022-02-17 RX ORDER — GABAPENTIN 300 MG/1
300 CAPSULE ORAL 3 TIMES DAILY
Qty: 90 CAPSULE | Refills: 5 | Status: SHIPPED | OUTPATIENT
Start: 2022-02-17 | End: 2022-08-11 | Stop reason: SDUPTHER

## 2022-02-17 RX ORDER — BACLOFEN 10 MG/1
10 TABLET ORAL 3 TIMES DAILY
Qty: 90 TABLET | Refills: 5 | Status: SHIPPED | OUTPATIENT
Start: 2022-02-17 | End: 2022-03-19

## 2022-02-17 RX ORDER — TRAMADOL HYDROCHLORIDE 50 MG/1
50 TABLET ORAL DAILY
COMMUNITY
End: 2022-03-10 | Stop reason: SDUPTHER

## 2022-02-17 NOTE — PROGRESS NOTES
NEUROLOGY Follow-up    Patient Name:  Julián Donovan  :   1970  Clinic Visit Date: 2022    I saw Ms. Julián Donovan  in the neurology clinic today for follow-up of lower back pain and pain in the lower extremities. Symptoms have improved slightly with baclofen and tramadol as needed. She continues have persistent lower back pain, but  has improved. No focal weakness in the lower extremities or bladder symptoms. No history of any other significant neurological symptoms of concern at this time    REVIEW OF SYSTEMS    Constitutional Weight changes: present, change in appetite: present Fatigue: present; Fevers : absent, Any recent hospitalizations:  absent   HEENT Ears: normal,  Visual disturbance: absent   Respiratory Shortness of breath: present, choking:  absent, Cough: present, Snoring : present   Cardiovascular Chest pain: absent, Leg swelling :present, palpitations : absent, fainting : absent   GI Constipation: absent, Diarrhea: absent, Swallowing change: absent    Urinary frequency: absent, Urinary urgency: absent, Urinary incontinence: absent   Musculoskeletal Neck pain: absent, Back pain: present, Stiffness: absent, Muscle pain: absent, Joint pain: present, restless leg : present   Dermatological Hair loss: absent, Skin changes: absent   Neurological Confusion: present, Trouble concentrating: present, Seizures: absent;  Memory loss: present, balance problem: present, Dizziness: present, vertigo: absent, Weakness: absent, Numbness present, Tremor: absent, Spasm: present, involuntary movement: present, Speech difficulty: absent, Headache: present, Light sensitivity: absent   Psychiatric Anxiety: absent, Depression  absent, drug abuse: absent, Hallucination: absent, mood disorder: present, Suicidal ideations absent   Hematologic Abnormal bleeding: absent, Anemia: absent, Lymph gland changes: absent Clotting disorder: absent     Past Medical History:   Diagnosis Date    Anxiety     Depression  Family history of thyroid problem     Hypertension     Osteoarthritis        Past Surgical History:   Procedure Laterality Date    CHOLECYSTECTOMY         Social History     Socioeconomic History    Marital status: Legally      Spouse name: Not on file    Number of children: Not on file    Years of education: Not on file    Highest education level: Not on file   Occupational History    Not on file   Tobacco Use    Smoking status: Current Every Day Smoker     Packs/day: 1.00     Types: Cigarettes    Smokeless tobacco: Never Used   Vaping Use    Vaping Use: Never used   Substance and Sexual Activity    Alcohol use: Yes     Comment: occ    Drug use: Not Currently    Sexual activity: Not on file   Other Topics Concern    Not on file   Social History Narrative    Not on file     Social Determinants of Health     Financial Resource Strain:     Difficulty of Paying Living Expenses: Not on file   Food Insecurity: Unknown    Worried About Running Out of Food in the Last Year: Patient refused    Ran Out of Food in the Last Year: Patient refused   Transportation Needs: Unknown    Lack of Transportation (Medical): Patient refused    Lack of Transportation (Non-Medical): Patient refused   Physical Activity:     Days of Exercise per Week: Not on file   [employer] of Exercise per Session: Not on file   Stress:     Feeling of Stress : Not on file   Social Connections:     Frequency of Communication with Friends and Family: Not on file    Frequency of Social Gatherings with Friends and Family: Not on file    Attends [employer] Services: Not on file    Active Member of [address] [place] [address] or Organizations: Not on file    Attends Club or Organization Meetings: Not on file    Marital Status: Not on file   Intimate Partner Violence:     Fear of Current or Ex-Partner: Not on file    Emotionally Abused: Not on file    Physically Abused: Not on file    Sexually Abused: Not on file   Housing Stability:     Unable to Pay for Housing in the Last Year: Not on file    Number of Places Lived in the Last Year: Not on file    Unstable Housing in the Last Year: Not on file       Family History   Problem Relation Age of Onset    Other Mother     Diabetes Father     Heart Disease Father     Cancer Maternal Grandfather        Current Outpatient Medications   Medication Sig Dispense Refill    traMADol (ULTRAM) 50 MG tablet Take 50 mg by mouth Daily.  gabapentin (NEURONTIN) 300 MG capsule Take 1 capsule by mouth 3 times daily for 30 days. Intended supply: 90 days 90 capsule 5    baclofen (LIORESAL) 10 MG tablet Take 1 tablet by mouth 3 times daily 90 tablet 5    ARIPiprazole (ABILIFY) 15 MG tablet Take 1 tablet by mouth daily 30 tablet 1    lisinopril-hydroCHLOROthiazide (PRINZIDE;ZESTORETIC) 10-12.5 MG per tablet Take 1 tablet by mouth daily 30 tablet 5    meloxicam (MOBIC) 15 MG tablet Take 1 tablet by mouth daily 30 tablet 3    acetaminophen (TYLENOL) 500 MG tablet Take 500 mg by mouth every 6 hours as needed for Pain        No current facility-administered medications for this visit. DATA:  Lab Results   Component Value Date    WBC 10.4 11/30/2021    HGB 16.1 (H) 11/30/2021     11/30/2021    CHOL 226 (H) 05/20/2021    TRIG 147 05/20/2021    HDL 61 05/20/2021    ALT 29 11/30/2021    AST 20 11/30/2021     11/30/2021    K 4.6 11/30/2021     11/30/2021    CREATININE 0.65 11/30/2021    BUN 17 11/30/2021    CO2 22 11/30/2021    TSH 1.83 05/20/2021    LABA1C 5.9 05/20/2021       /69 (Site: Right Upper Arm, Position: Sitting, Cuff Size: Medium Adult)   Pulse 100   Temp 97.6 °F (36.4 °C) (Temporal)   Resp 18   Ht 5' (1.524 m)   Wt 262 lb 3.2 oz (118.9 kg)   LMP 07/27/2020   BMI 51.21 kg/m²     NEUROLOGICAL EXAMINATION:     MENTAL STATUS:.  Normal    CRANIAL NERVES[de-identified] II-XII are normal.    MOTOR EXAMINATION: Muscle tone is normal in all the limbs. No focal extremity weakness.   No abnormal limb movements    SENSORY EXAMINATION: Normal.     STRETCH REFLEXES: Symmetrical in both the upper and lower limbs. GAIT: Antalgic gait. Marlee Rued IMPRESSION:  Chronic lower back pain and pain in the lower extremities. Cause is most likely underlying degenerative lumbar spine disease. Unable to afford physical therapy    PLAN:    1. Baclofen 10 mg 3 times daily. 2.  Tramadol 50 mg once or twice a day as needed  3. Continue gabapentin 300 mg 3 times daily  4. Improve posture  5. Follow-up in 3 months    NOTE: This neurology evaluation is part of outpatient coverage at [facility]  1-2 days per week. Patients requiring frequent evaluations or uncomfortable with potential 3-4 day turnaround on questions or calls  may be better served by a neurologist in the area full time. [facility]. Kalpana is available for outpatient visits and procedures including EMG/NCS. Non-[facility] system neurologists also practice in [facility] (Dr. Johnna Pena) and [place] (Ev Booth).        Mahesh Hudson MD   2/17/2022  5:08 PM

## 2022-02-17 NOTE — PATIENT INSTRUCTIONS
SURVEY:    You may be receiving a survey from [employer] regarding your visit today. Please complete the survey to enable us to provide the highest quality of care to you and your family. If you cannot score us a very good on any question, please call the office to discuss how we could have made your experience a very good one. Thank you.

## 2022-02-24 ENCOUNTER — OFFICE VISIT (OUTPATIENT)
Dept: PRIMARY CARE CLINIC | Age: 52
End: 2022-02-24
Payer: MEDICAID

## 2022-02-24 VITALS
BODY MASS INDEX: 51.21 KG/M2 | WEIGHT: 262.2 LBS | HEART RATE: 90 BPM | OXYGEN SATURATION: 97 % | RESPIRATION RATE: 20 BRPM | TEMPERATURE: 97.8 F | SYSTOLIC BLOOD PRESSURE: 122 MMHG | DIASTOLIC BLOOD PRESSURE: 70 MMHG

## 2022-02-24 DIAGNOSIS — R50.9 FEVER, UNSPECIFIED FEVER CAUSE: ICD-10-CM

## 2022-02-24 DIAGNOSIS — F17.200 SMOKER: ICD-10-CM

## 2022-02-24 DIAGNOSIS — J20.9 ACUTE BRONCHITIS, UNSPECIFIED ORGANISM: Primary | ICD-10-CM

## 2022-02-24 PROCEDURE — 87804 INFLUENZA ASSAY W/OPTIC: CPT | Performed by: NURSE PRACTITIONER

## 2022-02-24 PROCEDURE — 99213 OFFICE O/P EST LOW 20 MIN: CPT | Performed by: NURSE PRACTITIONER

## 2022-02-24 RX ORDER — PREDNISONE 20 MG/1
40 TABLET ORAL DAILY
Qty: 10 TABLET | Refills: 0 | Status: SHIPPED | OUTPATIENT
Start: 2022-02-24 | End: 2022-03-01

## 2022-02-24 RX ORDER — ALBUTEROL SULFATE 90 UG/1
2 AEROSOL, METERED RESPIRATORY (INHALATION) 4 TIMES DAILY PRN
Qty: 54 G | Refills: 1 | Status: SHIPPED | OUTPATIENT
Start: 2022-02-24 | End: 2022-07-14 | Stop reason: SDUPTHER

## 2022-02-24 RX ORDER — BENZONATATE 200 MG/1
200 CAPSULE ORAL 3 TIMES DAILY PRN
Qty: 30 CAPSULE | Refills: 0 | Status: SHIPPED | OUTPATIENT
Start: 2022-02-24 | End: 2022-03-06

## 2022-02-24 RX ORDER — AZITHROMYCIN 250 MG/1
250 TABLET, FILM COATED ORAL SEE ADMIN INSTRUCTIONS
Qty: 6 TABLET | Refills: 0 | Status: SHIPPED | OUTPATIENT
Start: 2022-02-24 | End: 2022-03-01

## 2022-02-24 ASSESSMENT — ENCOUNTER SYMPTOMS
COUGH: 1
SHORTNESS OF BREATH: 1
SORE THROAT: 1
RHINORRHEA: 0
HEARTBURN: 0
WHEEZING: 1
HEMOPTYSIS: 0

## 2022-02-24 ASSESSMENT — PATIENT HEALTH QUESTIONNAIRE - PHQ9
SUM OF ALL RESPONSES TO PHQ9 QUESTIONS 1 & 2: 0
2. FEELING DOWN, DEPRESSED OR HOPELESS: 0
SUM OF ALL RESPONSES TO PHQ QUESTIONS 1-9: 0
1. LITTLE INTEREST OR PLEASURE IN DOING THINGS: 0
SUM OF ALL RESPONSES TO PHQ QUESTIONS 1-9: 0

## 2022-02-24 NOTE — PATIENT INSTRUCTIONS
SURVEY:     You may be receiving a survey from [employer] regarding your visit today. Please complete the survey to enable us to provide the highest quality of care to you and your family. If you cannot score us a very good on any question, please call the office to discuss how we could have made your experience a very good one. Thank you.   Anjelica Rothman, APRN-ANDREW Garcia, ANDREW Somers, LPN  Kym Ponce, LPANNETTE Snider, CMA  Tip Morgan, CMA  Ros[place], CMA  Cookie, PCA

## 2022-02-24 NOTE — PROGRESS NOTES
Name: Prema Joiner  : 1970         Chief Complaint:     Chief Complaint   Patient presents with    Fever     X 1 week, morning and evening       History of Present Illness:      Prema Joiner is a 54 y.o.  female who presents with Fever (X 1 week, morning and evening)      Christiano Justice is here today for a routine office visit. Cough  This is a new problem. The current episode started 1 to 4 weeks ago. The problem has been gradually worsening. Episode frequency: MAINLY NOCTURNAL. The cough is productive of sputum. Associated symptoms include a fever, nasal congestion, postnasal drip, a sore throat, shortness of breath and wheezing. Pertinent negatives include no chest pain, chills, ear congestion, ear pain, headaches, heartburn, hemoptysis, myalgias, rash, rhinorrhea, sweats or weight loss. The symptoms are aggravated by lying down and fumes. Risk factors for lung disease include smoking/tobacco exposure. She has tried a beta-agonist inhaler and rest for the symptoms. The treatment provided mild relief. Her past medical history is significant for bronchitis and environmental allergies. There is no history of asthma, bronchiectasis, COPD, emphysema or pneumonia. Past Medical History:     Past Medical History:   Diagnosis Date    Anxiety     Depression     Family history of thyroid problem     Hypertension     Osteoarthritis       Reviewed all health maintenance requirements and ordered appropriate tests  There are no preventive care reminders to display for this patient. Past Surgical History:     Past Surgical History:   Procedure Laterality Date    CHOLECYSTECTOMY          Medications:       Prior to Admission medications    Medication Sig Start Date End Date Taking?  Authorizing Provider   benzonatate (TESSALON) 200 MG capsule Take 1 capsule by mouth 3 times daily as needed for Cough 2/24/22 3/6/22 Yes Jodee Rothman, APRN - [facility]   albuterol sulfate HFA (VENTOLIN HFA) 108 (90 Base) MCG/ACT inhaler Inhale 2 puffs into the lungs 4 times daily as needed for Wheezing 2/24/22  Yes REBEKAH Damian CNP   azithromycin (ZITHROMAX) 250 MG tablet Take 1 tablet by mouth See Admin Instructions for 5 days 500mg on day 1 followed by 250mg on days 2 - 5 2/24/22 3/1/22 Yes FeliciaugREBEKAH Bhagat CNP   predniSONE (DELTASONE) 20 MG tablet Take 2 tablets by mouth daily for 5 days 2/24/22 3/1/22 Yes Refugia PlREBEKAH Leo CNP   traMADol (ULTRAM) 50 MG tablet Take 50 mg by mouth Daily. Yes Historical Provider, MD   gabapentin (NEURONTIN) 300 MG capsule Take 1 capsule by mouth 3 times daily for 30 days. Intended supply: 90 days 2/17/22 3/19/22 Yes Laura Fitch MD   baclofen (LIORESAL) 10 MG tablet Take 1 tablet by mouth 3 times daily 2/17/22 3/19/22 Yes Laura Fitch MD   ARIPiprazole (ABILIFY) 15 MG tablet Take 1 tablet by mouth daily 1/13/22 2/24/22 Yes REBEKAH Galicia CNP   lisinopril-hydroCHLOROthiazide (PRINZIDE;ZESTORETIC) 10-12.5 MG per tablet Take 1 tablet by mouth daily 1/11/22  Yes REBEKAH Galicia CNP   meloxicam (MOBIC) 15 MG tablet Take 1 tablet by mouth daily 11/24/21  Yes REBEKAH Galicia CNP   acetaminophen (TYLENOL) 500 MG tablet Take 500 mg by mouth every 6 hours as needed for Pain    Yes Historical Provider, MD        Allergies: Other    Social History:     Tobacco:    reports that she has been smoking cigarettes. She has been smoking about 1.00 pack per day. She has never used smokeless tobacco.  Alcohol:      reports current alcohol use. Drug Use:  reports previous drug use. Family History:     Family History   Problem Relation Age of Onset    Other Mother     Diabetes Father     Heart Disease Father     Cancer Maternal Grandfather        Review of Systems:     Positive and Negative as described in HPI    Review of Systems   Constitutional: Positive for fever. Negative for chills and weight loss.    HENT: Positive for postnasal drip and sore throat. Negative for ear pain and rhinorrhea. Respiratory: Positive for cough, shortness of breath and wheezing. Negative for hemoptysis. Cardiovascular: Negative for chest pain. Gastrointestinal: Negative for heartburn. Musculoskeletal: Negative for myalgias. Skin: Negative for rash. Allergic/Immunologic: Positive for environmental allergies. Neurological: Negative for headaches. Physical Exam:   Vitals:  /70 (Position: Sitting)   Pulse 90   Temp 97.8 °F (36.6 °C) (Temporal)   Resp 20   Wt 262 lb 3.2 oz (118.9 kg)   LMP 07/27/2020   SpO2 97%   BMI 51.21 kg/m²     Physical Exam  Vitals and nursing note reviewed. Constitutional:       General: She is not in acute distress. Appearance: Normal appearance. She is obese. She is not ill-appearing. HENT:      Right Ear: Tympanic membrane normal.      Left Ear: Tympanic membrane normal.      Nose: Congestion present. Mouth/Throat:      Mouth: Mucous membranes are moist.      Pharynx: Posterior oropharyngeal erythema present. Eyes:      General: No scleral icterus. Conjunctiva/sclera: Conjunctivae normal.   Cardiovascular:      Rate and Rhythm: Normal rate and regular rhythm. Pulmonary:      Effort: Pulmonary effort is normal.      Breath sounds: Normal breath sounds. No wheezing. Musculoskeletal:      Cervical back: Normal range of motion and neck supple. Lymphadenopathy:      Cervical: No cervical adenopathy. Skin:     General: Skin is warm and dry. Findings: No rash. Neurological:      Mental Status: She is alert and oriented to person, place, and time.          Data:     Lab Results   Component Value Date     11/30/2021    K 4.6 11/30/2021     11/30/2021    CO2 22 11/30/2021    BUN 17 11/30/2021    CREATININE 0.65 11/30/2021    GLUCOSE 110 11/30/2021    PROT 7.3 11/30/2021    LABALBU 4.0 11/30/2021    BILITOT 0.20 11/30/2021    ALKPHOS 95 11/30/2021    AST 20 11/30/2021    ALT 29 11/30/2021 Lab Results   Component Value Date    WBC 10.4 11/30/2021    RBC 5.30 11/30/2021    HGB 16.1 11/30/2021    HCT 49.2 11/30/2021    MCV 92.8 11/30/2021    MCH 30.4 11/30/2021    MCHC 32.7 11/30/2021    RDW 14.4 11/30/2021     11/30/2021    MPV 9.9 11/30/2021     Lab Results   Component Value Date    TSH 1.83 05/20/2021     Lab Results   Component Value Date    CHOL 226 05/20/2021    HDL 61 05/20/2021    LABA1C 5.9 05/20/2021       Assessment/Plan:      Diagnosis Orders   1. Acute bronchitis, unspecified organism  benzonatate (TESSALON) 200 MG capsule    albuterol sulfate HFA (VENTOLIN HFA) 108 (90 Base) MCG/ACT inhaler    azithromycin (ZITHROMAX) 250 MG tablet    predniSONE (DELTASONE) 20 MG tablet   2. Smoker  benzonatate (TESSALON) 200 MG capsule    albuterol sulfate HFA (VENTOLIN HFA) 108 (90 Base) MCG/ACT inhaler    azithromycin (ZITHROMAX) 250 MG tablet    predniSONE (DELTASONE) 20 MG tablet   3. Fever, unspecified fever cause  POCT Influenza A/B       1. Corazon Shaikh received counseling on the following healthy behaviors: nutrition and medication adherence  2. Patient given educational materials - see patient instructions  3. Was a self-tracking handout given in paper form or via [employer]? No  If yes, see orders or list here. 4.  Discussed use, benefit, and side effects of prescribed medications. Barriers to medication compliance addressed. All patient questions answered. Pt voiced understanding. 5.  Reviewed prior labs and health maintenance  6. Continue current medications, diet and exercise.     Completed Refills   Requested Prescriptions     Signed Prescriptions Disp Refills    benzonatate (TESSALON) 200 MG capsule 30 capsule 0     Sig: Take 1 capsule by mouth 3 times daily as needed for Cough    albuterol sulfate HFA (VENTOLIN HFA) 108 (90 Base) MCG/ACT inhaler 54 g 1     Sig: Inhale 2 puffs into the lungs 4 times daily as needed for Wheezing    azithromycin (ZITHROMAX) 250 MG tablet 6 tablet 0 Sig: Take 1 tablet by mouth See Admin Instructions for 5 days 500mg on day 1 followed by 250mg on days 2 - 5    predniSONE (DELTASONE) 20 MG tablet 10 tablet 0     Sig: Take 2 tablets by mouth daily for 5 days         Return if symptoms worsen or fail to improve.

## 2022-02-25 LAB
INFLUENZA A ANTIBODY: NORMAL
INFLUENZA B ANTIBODY: NORMAL

## 2022-03-07 ENCOUNTER — TELEPHONE (OUTPATIENT)
Dept: NEUROLOGY | Age: 52
End: 2022-03-07

## 2022-03-07 NOTE — TELEPHONE ENCOUNTER
Writer called [facility] and Pharmacist states last script for tramodol was filled on 2/5 for 28 tabs. They have no script from 2/17.   Please advise

## 2022-03-10 DIAGNOSIS — G89.29 CHRONIC BILATERAL LOW BACK PAIN WITH SCIATICA, SCIATICA LATERALITY UNSPECIFIED: Primary | ICD-10-CM

## 2022-03-10 DIAGNOSIS — M54.40 CHRONIC BILATERAL LOW BACK PAIN WITH SCIATICA, SCIATICA LATERALITY UNSPECIFIED: Primary | ICD-10-CM

## 2022-03-10 RX ORDER — TRAMADOL HYDROCHLORIDE 50 MG/1
50 TABLET ORAL DAILY
Qty: 30 TABLET | Refills: 1 | Status: SHIPPED | OUTPATIENT
Start: 2022-03-10 | End: 2022-04-09

## 2022-03-11 RX ORDER — ARIPIPRAZOLE 15 MG/1
TABLET ORAL
Qty: 30 TABLET | Refills: 1 | Status: SHIPPED | OUTPATIENT
Start: 2022-03-11 | End: 2022-05-13

## 2022-03-21 ENCOUNTER — APPOINTMENT (OUTPATIENT)
Dept: GENERAL RADIOLOGY | Age: 52
End: 2022-03-21
Payer: MEDICAID

## 2022-03-21 ENCOUNTER — HOSPITAL ENCOUNTER (EMERGENCY)
Age: 52
Discharge: HOME | End: 2022-03-22
Attending: EMERGENCY MEDICINE
Payer: MEDICAID

## 2022-03-21 DIAGNOSIS — R60.9 DEPENDENT EDEMA: Primary | ICD-10-CM

## 2022-03-21 LAB
ABSOLUTE EOS #: 0.26 K/UL (ref 0–0.44)
ABSOLUTE IMMATURE GRANULOCYTE: 0.07 K/UL (ref 0–0.3)
ABSOLUTE LYMPH #: 3.72 K/UL (ref 1.1–3.7)
ABSOLUTE MONO #: 1.24 K/UL (ref 0.1–1.2)
ALBUMIN SERPL-MCNC: 4.1 G/DL (ref 3.5–5.2)
ALBUMIN/GLOBULIN RATIO: 1.3 (ref 1–2.5)
ALP BLD-CCNC: 109 U/L (ref 35–104)
ALT SERPL-CCNC: 25 U/L (ref 5–33)
ANION GAP SERPL CALCULATED.3IONS-SCNC: 14 MMOL/L (ref 9–17)
AST SERPL-CCNC: 23 U/L
BASOPHILS # BLD: 1 % (ref 0–2)
BASOPHILS ABSOLUTE: 0.09 K/UL (ref 0–0.2)
BILIRUB SERPL-MCNC: 0.16 MG/DL (ref 0.3–1.2)
BUN BLDV-MCNC: 14 MG/DL (ref 6–20)
BUN/CREAT BLD: 19 (ref 9–20)
CALCIUM SERPL-MCNC: 9.6 MG/DL (ref 8.6–10.4)
CHLORIDE BLD-SCNC: 99 MMOL/L (ref 98–107)
CO2: 25 MMOL/L (ref 20–31)
CREAT SERPL-MCNC: 0.73 MG/DL (ref 0.5–0.9)
EOSINOPHILS RELATIVE PERCENT: 2 % (ref 1–4)
GFR AFRICAN AMERICAN: >60 ML/MIN
GFR NON-AFRICAN AMERICAN: >60 ML/MIN
GFR SERPL CREATININE-BSD FRML MDRD: ABNORMAL ML/MIN/{1.73_M2}
GFR SERPL CREATININE-BSD FRML MDRD: ABNORMAL ML/MIN/{1.73_M2}
GLUCOSE BLD-MCNC: 102 MG/DL (ref 70–99)
HCO3 VENOUS: 25.4 MMOL/L (ref 24–30)
HCT VFR BLD CALC: 48.2 % (ref 36.3–47.1)
HEMOGLOBIN: 16.1 G/DL (ref 11.9–15.1)
IMMATURE GRANULOCYTES: 1 %
LYMPHOCYTES # BLD: 32 % (ref 24–43)
MCH RBC QN AUTO: 31.4 PG (ref 25.2–33.5)
MCHC RBC AUTO-ENTMCNC: 33.4 G/DL (ref 28.4–34.8)
MCV RBC AUTO: 94.1 FL (ref 82.6–102.9)
MONOCYTES # BLD: 11 % (ref 3–12)
NRBC AUTOMATED: 0 PER 100 WBC
O2 SAT, VEN: 88.5 % (ref 60–85)
PATIENT TEMP: 37
PCO2, VEN: 39 (ref 39–55)
PDW BLD-RTO: 14.1 % (ref 11.8–14.4)
PH VENOUS: 7.43 (ref 7.32–7.42)
PLATELET # BLD: 327 K/UL (ref 138–453)
PMV BLD AUTO: 9.5 FL (ref 8.1–13.5)
PO2, VEN: 53.1 (ref 30–50)
POSITIVE BASE EXCESS, VEN: 1.2 MMOL/L (ref 0–2)
POTASSIUM SERPL-SCNC: 3.8 MMOL/L (ref 3.7–5.3)
PRO-BNP: 22 PG/ML
RBC # BLD: 5.12 M/UL (ref 3.95–5.11)
SEG NEUTROPHILS: 53 % (ref 36–65)
SEGMENTED NEUTROPHILS ABSOLUTE COUNT: 6.23 K/UL (ref 1.5–8.1)
SODIUM BLD-SCNC: 138 MMOL/L (ref 135–144)
TOTAL PROTEIN: 7.3 G/DL (ref 6.4–8.3)
TROPONIN, HIGH SENSITIVITY: <6 NG/L (ref 0–14)
WBC # BLD: 11.6 K/UL (ref 3.5–11.3)

## 2022-03-21 PROCEDURE — 36415 COLL VENOUS BLD VENIPUNCTURE: CPT

## 2022-03-21 PROCEDURE — 82805 BLOOD GASES W/O2 SATURATION: CPT

## 2022-03-21 PROCEDURE — 80053 COMPREHEN METABOLIC PANEL: CPT

## 2022-03-21 PROCEDURE — 84484 ASSAY OF TROPONIN QUANT: CPT

## 2022-03-21 PROCEDURE — 84443 ASSAY THYROID STIM HORMONE: CPT

## 2022-03-21 PROCEDURE — 6370000000 HC RX 637 (ALT 250 FOR IP): Performed by: EMERGENCY MEDICINE

## 2022-03-21 PROCEDURE — 94640 AIRWAY INHALATION TREATMENT: CPT

## 2022-03-21 PROCEDURE — 71045 X-RAY EXAM CHEST 1 VIEW: CPT

## 2022-03-21 PROCEDURE — 93005 ELECTROCARDIOGRAM TRACING: CPT | Performed by: EMERGENCY MEDICINE

## 2022-03-21 PROCEDURE — 99283 EMERGENCY DEPT VISIT LOW MDM: CPT

## 2022-03-21 PROCEDURE — 85025 COMPLETE CBC W/AUTO DIFF WBC: CPT

## 2022-03-21 PROCEDURE — 85379 FIBRIN DEGRADATION QUANT: CPT

## 2022-03-21 PROCEDURE — 83880 ASSAY OF NATRIURETIC PEPTIDE: CPT

## 2022-03-21 RX ORDER — BACLOFEN 10 MG/1
20 TABLET ORAL 3 TIMES DAILY
COMMUNITY
End: 2022-03-24 | Stop reason: SDUPTHER

## 2022-03-21 RX ORDER — IPRATROPIUM BROMIDE AND ALBUTEROL SULFATE 2.5; .5 MG/3ML; MG/3ML
1 SOLUTION RESPIRATORY (INHALATION) ONCE
Status: COMPLETED | OUTPATIENT
Start: 2022-03-21 | End: 2022-03-21

## 2022-03-21 RX ADMIN — IPRATROPIUM BROMIDE AND ALBUTEROL SULFATE 1 AMPULE: 2.5; .5 SOLUTION RESPIRATORY (INHALATION) at 23:54

## 2022-03-22 ENCOUNTER — HOSPITAL ENCOUNTER (OUTPATIENT)
Dept: VASCULAR LAB | Age: 52
Discharge: HOME | End: 2022-03-24
Payer: MEDICAID

## 2022-03-22 ENCOUNTER — APPOINTMENT (OUTPATIENT)
Dept: CT IMAGING | Age: 52
End: 2022-03-22
Payer: MEDICAID

## 2022-03-22 VITALS
DIASTOLIC BLOOD PRESSURE: 51 MMHG | BODY MASS INDEX: 49.08 KG/M2 | SYSTOLIC BLOOD PRESSURE: 118 MMHG | WEIGHT: 250 LBS | HEIGHT: 60 IN | OXYGEN SATURATION: 94 % | TEMPERATURE: 98.5 F | HEART RATE: 120 BPM | RESPIRATION RATE: 20 BRPM

## 2022-03-22 DIAGNOSIS — R60.9 SWELLING: ICD-10-CM

## 2022-03-22 LAB
D-DIMER QUANTITATIVE: 0.73 MG/L FEU (ref 0–0.59)
EKG ATRIAL RATE: 116 BPM
EKG P AXIS: 55 DEGREES
EKG P-R INTERVAL: 114 MS
EKG Q-T INTERVAL: 320 MS
EKG QRS DURATION: 76 MS
EKG QTC CALCULATION (BAZETT): 444 MS
EKG R AXIS: 82 DEGREES
EKG T AXIS: 35 DEGREES
EKG VENTRICULAR RATE: 116 BPM
TROPONIN, HIGH SENSITIVITY: <6 NG/L (ref 0–14)
TSH SERPL DL<=0.05 MIU/L-ACNC: 2.65 MIU/L (ref 0.3–5)

## 2022-03-22 PROCEDURE — 93010 ELECTROCARDIOGRAM REPORT: CPT | Performed by: INTERNAL MEDICINE

## 2022-03-22 PROCEDURE — 71260 CT THORAX DX C+: CPT

## 2022-03-22 PROCEDURE — 93970 EXTREMITY STUDY: CPT

## 2022-03-22 PROCEDURE — 84484 ASSAY OF TROPONIN QUANT: CPT

## 2022-03-22 PROCEDURE — 6360000004 HC RX CONTRAST MEDICATION: Performed by: EMERGENCY MEDICINE

## 2022-03-22 RX ADMIN — IOPAMIDOL 75 ML: 755 INJECTION, SOLUTION INTRAVENOUS at 00:28

## 2022-03-22 NOTE — ED NOTES
Radiology was called and an appointment was arranged at 8:30 am, pt is aware.      Aleta Coello RN  03/22/22 0675

## 2022-03-22 NOTE — ED NOTES
Pt states she does not want Covid swab, states she was tested at [facility] 3 days ago and was negative     Mary Grijalva RN  03/21/22 [id number]

## 2022-03-22 NOTE — ED PROVIDER NOTES
CONTINUE these medications which have NOT CHANGED    Details   baclofen (LIORESAL) 10 MG tablet Take 20 mg by mouth 3 times dailyHistorical Med      ARIPiprazole (ABILIFY) 15 MG tablet take 1 tablet by mouth once daily, Disp-30 tablet, R-1Normal      traMADol (ULTRAM) 50 MG tablet Take 1 tablet by mouth Daily for 30 days. , Disp-30 tablet, R-1Print      albuterol sulfate HFA (VENTOLIN HFA) 108 (90 Base) MCG/ACT inhaler Inhale 2 puffs into the lungs 4 times daily as needed for Wheezing, Disp-54 g, R-1Normal      gabapentin (NEURONTIN) 300 MG capsule Take 1 capsule by mouth 3 times daily for 30 days.  Intended supply: 90 days, Disp-90 capsule, R-5Normal      lisinopril-hydroCHLOROthiazide (PRINZIDE;ZESTORETIC) 10-12.5 MG per tablet Take 1 tablet by mouth daily, Disp-30 tablet, R-5Normal      meloxicam (MOBIC) 15 MG tablet Take 1 tablet by mouth daily, Disp-30 tablet, R-3Normal      acetaminophen (TYLENOL) 500 MG tablet Take 500 mg by mouth every 6 hours as needed for Pain Historical Med             ALLERGIES     Other    FAMILY HISTORY       Family History   Problem Relation Age of Onset    Other Mother     Diabetes Father     Heart Disease Father     Cancer Maternal Grandfather           SOCIAL HISTORY       Social History     Socioeconomic History    Marital status: Legally      Spouse name: None    Number of children: None    Years of education: None    Highest education level: None   Occupational History    None   Tobacco Use    Smoking status: Current Every Day Smoker     Packs/day: 1.00     Types: Cigarettes    Smokeless tobacco: Never Used   Vaping Use    Vaping Use: Never used   Substance and Sexual Activity    Alcohol use: Yes     Comment: occ    Drug use: Not Currently    Sexual activity: None   Other Topics Concern    None   Social History Narrative    None     Social Determinants of Health     Financial Resource Strain:     Difficulty of Paying Living Expenses: Not on file   Food Insecurity: Unknown    Worried About Running Out of Food in the Last Year: Patient refused   [address] in the Last Year: Patient refused   Transportation Needs: Unknown    Lack of Transportation (Medical): Patient refused    Lack of Transportation (Non-Medical): Patient refused   Physical Activity:     Days of Exercise per Week: Not on file   [employer] of Exercise per Session: Not on file   Stress:     Feeling of Stress : Not on file   Social Connections:     Frequency of Communication with Friends and Family: Not on file    Frequency of Social Gatherings with Friends and Family: Not on file    Attends [employer] Services: Not on file    Active Member of [address] [place] [address] [employer] or Organizations: Not on file    Attends Club or Organization Meetings: Not on file    Marital Status: Not on file   Intimate Partner Violence:     Fear of Current or Ex-Partner: Not on file    Emotionally Abused: Not on file    Physically Abused: Not on file    Sexually Abused: Not on file   Housing Stability:     Unable to Pay for Housing in the Last Year: Not on file    Number of [place] in the Last Year: Not on file    Unstable Housing in the Last Year: Not on file       SCREENINGS        [place] Coma Scale  Eye Opening: Spontaneous  Best Verbal Response: Oriented  Best Motor Response: Obeys commands  [place] Coma Scale Score: 15               PHYSICAL EXAM    (up to 7 for level 4, 8 or more for level 5)     ED Triage Vitals   BP Temp Temp src Pulse Resp SpO2 Height Weight   -- -- -- -- -- -- -- --       Physical Exam  Vitals and nursing note reviewed. Constitutional:       Appearance: She is obese. Comments: Short of breath at rest   HENT:      Head: Normocephalic. Mouth/Throat:      Mouth: Mucous membranes are moist.      Pharynx: Oropharynx is clear. Eyes:      Pupils: Pupils are equal, round, and reactive to light. Neck:      Thyroid: No thyromegaly.    Cardiovascular:      Rate and Rhythm: Normal rate and regular rhythm. Pulses: Normal pulses. Heart sounds: Normal heart sounds. No murmur heard. Pulmonary:      Comments: Decreased breath sounds bilateral bases  Abdominal:      Palpations: Abdomen is soft. Tenderness: There is no abdominal tenderness. Musculoskeletal:      Cervical back: Normal range of motion. Right lower leg: Edema present. Left lower leg: Edema present. Comments: Symmetrical swelling legs with pitting edema 3-4+   Skin:     General: Skin is warm. Capillary Refill: Capillary refill takes less than 2 seconds. Findings: No rash. Nails: There is no clubbing. Neurological:      General: No focal deficit present. Mental Status: She is alert. DIAGNOSTIC RESULTS     EKG: All EKG's are interpreted by the Emergency Department Physician who either signs or Co-signs this chart in the absence of a cardiologist.      RADIOLOGY:   Non-plain film images such as CT, Ultrasound and MRI are read by the radiologist. Plain radiographic images are visualized and preliminarily interpreted by the emergency physician with the below findings:      Interpretation per the Radiologist below, if available at the time of this note:    CT CHEST PULMONARY EMBOLISM W CONTRAST   Final Result   No evidence of pulmonary embolism or acute pulmonary abnormality. XR CHEST PORTABLE   Final Result   1. Stable interstitial prominence, consistent with underlying interstitial   lung disease   2.  No evidence of acute cardiopulmonary disease         VL DUP LOWER EXTREMITY VENOUS BILATERAL    (Results Pending)         ED BEDSIDE ULTRASOUND:   Performed by ED Physician - none    LABS:  Labs Reviewed   CBC WITH AUTO DIFFERENTIAL - Abnormal; Notable for the following components:       Result Value    WBC 11.6 (*)     RBC 5.12 (*)     Hemoglobin 16.1 (*)     Hematocrit 48.2 (*)     Immature Granulocytes 1 (*)     Absolute Lymph # 3.72 (*)     Absolute Mono # 1.24 (*)     All other components within normal limits   COMPREHENSIVE METABOLIC PANEL W/ REFLEX TO MG FOR LOW K - Abnormal; Notable for the following components:    Glucose 102 (*)     Alkaline Phosphatase 109 (*)     Total Bilirubin 0.16 (*)     All other components within normal limits   BLOOD GAS, VENOUS - Abnormal; Notable for the following components:    pH, Charli 7.431 (*)     pO2, Charli 53.1 (*)     O2 Sat, Charli 88.5 (*)     All other components within normal limits   D-DIMER, QUANTITATIVE - Abnormal; Notable for the following components:    D-Dimer, Quant 0.73 (*)     All other components within normal limits   TROPONIN   BRAIN NATRIURETIC PEPTIDE   TSH WITH REFLEX   TROPONIN       All other labs were within normal range or not returned as of this dictation.     EMERGENCY DEPARTMENT COURSE and DIFFERENTIAL DIAGNOSIS/MDM:   Vitals:    Vitals:    03/22/22 0115 03/22/22 0117 03/22/22 0126 03/22/22 0217   BP: (!) 118/51      Pulse:  107 105 120   Resp:       Temp:       TempSrc:       SpO2: 91%   94%   Weight:       Height:             MDM  Number of Diagnoses or Management Options  Dependent edema  Diagnosis management comments: 42-year-old female presents emergency department with history as documented in the HPI    Diagnostic considerations acute congestive heart failure, myocardial infarction, ACS syndrome, dependent edema    Laboratory studies imaging studies electrocardiogram interpretation have been reviewed and discussed with the patient    Patient ambulated without difficulty throughout emergency department maintaining pulse oximeter above 94%    Outpatient venous Doppler studies have been requested-at this time I do not believe patient needs anticoagulation as patient has symmetrical swelling of her lower extremities    Patient knowledges discharge diagnosis the importance of timely follow-up have no additional questions or concerns was released in stable condition                REASSESSMENT   Patient remains hemodynamically stable nontoxic-appearing during emergency department course and well oxygenated with a pulse ox of 94%    ED Course as of 03/22/22 0254   Mon Mar 21, 2022   2304 EKG 12 Lead  Performed at 2301-the ventricular rate 116, QRS duration 0.076, MT interval 0.114, QTc corrected 0.444 there is no ST segment elevation [RS]   2343 XR CHEST PORTABLE  Chest x-ray no acute processes radiologist [RS]   Tue Mar 22, 2022   0245 CT CHEST PULMONARY EMBOLISM W CONTRAST  CT the patient chest no pulmonary emboli-no acute pulmonary finding [RS]   0246 Blood Gas, Venous(!):    pH, Charli 7.431(!)   pCO2, Charli 39.0   pO2, Charli 53. 1(!)   HCO3, Venous 25.4   Positive Base Excess, Charli 1.2   O2 Sat, Charli 88.5(!)   Pt Temp 37.0 [RS]   0246 Brain Natriuretic Peptide:    Pro-BNP 22 [RS]   0246 D-Dimer, Quantitative(!):    D-Dimer, Quant 0.73(!) [RS]   0246 TSH with Reflex:    TSH 2.65 [RS]   0246 Troponin:    Troponin, High Sensitivity <6 [RS]   0246 Comprehensive Metabolic Panel w/ Reflex to MG(!):    GLUCOSE, FASTING,(!)   BUN,BUNPL 14   Creatinine 0.73   Bun/Cre Ratio 19   CALCIUM, SERUM, 903311 9.6   Sodium 138   Potassium 3.8   Chloride 99   CO2 25   Anion Gap 14   Alk Phos 109(!)   ALT 25   AST 23   Bilirubin 0.16(!)   Total Protein 7.3   Albumin 4.1   ALBUMIN/GLOBULIN RATIO 1.3   GFR Non- >60   GFR  >60   GFR Comment        GFR Staging      [RS]      ED Course User Index  [RS] Kendall Bella MD         CRITICAL CARE TIME   Total Critical Care time was minutes, excluding separately reportable procedures. There was a high probability of clinically significant/life threatening deterioration in the patient's condition which required my urgent intervention. CONSULTS:  None    PROCEDURES:  Unless otherwise noted below, none     Procedures    FINAL IMPRESSION      1.  Dependent edema          DISPOSITION/PLAN   DISPOSITION Decision To Discharge 03/22/2022 02:06:58 AM      PATIENT REFERRED TO:  Herb Srivastava, APRN - [facility]  [address]  [phone]    Call in 1 day        DISCHARGE MEDICATIONS:  Discharge Medication List as of 3/22/2022  2:07 AM        Controlled Substances Monitoring:     RX Monitoring 7/9/2021   Periodic Controlled Substance Monitoring No signs of potential drug abuse or diversion identified.        (Please note that portions of this note were completed with a voice recognition program.  Efforts were made to edit the dictations but occasionally words are mis-transcribed.)    Ric Berumen MD (electronically signed)  Attending Emergency Physician            Ric Berumen MD  03/22/22 0870

## 2022-03-24 ENCOUNTER — OFFICE VISIT (OUTPATIENT)
Dept: PRIMARY CARE CLINIC | Age: 52
End: 2022-03-24
Payer: MEDICAID

## 2022-03-24 ENCOUNTER — HOSPITAL ENCOUNTER (OUTPATIENT)
Age: 52
Setting detail: SPECIMEN
Discharge: HOME | End: 2022-03-24
Payer: MEDICAID

## 2022-03-24 VITALS
DIASTOLIC BLOOD PRESSURE: 78 MMHG | TEMPERATURE: 97.3 F | SYSTOLIC BLOOD PRESSURE: 122 MMHG | WEIGHT: 265.6 LBS | HEART RATE: 97 BPM | RESPIRATION RATE: 18 BRPM | OXYGEN SATURATION: 95 % | BODY MASS INDEX: 51.87 KG/M2

## 2022-03-24 DIAGNOSIS — M54.41 ACUTE RIGHT-SIDED LOW BACK PAIN WITH RIGHT-SIDED SCIATICA: ICD-10-CM

## 2022-03-24 DIAGNOSIS — R60.9 PERIPHERAL EDEMA: ICD-10-CM

## 2022-03-24 DIAGNOSIS — Z09 FOLLOW-UP EXAMINATION: Primary | ICD-10-CM

## 2022-03-24 LAB
REASON FOR REJECTION: NORMAL
ZZ NTE CLEAN UP: ORDERED TEST: NORMAL
ZZ NTE WITH NAME CLEAN UP: SPECIMEN SOURCE: NORMAL

## 2022-03-24 PROCEDURE — 99214 OFFICE O/P EST MOD 30 MIN: CPT | Performed by: NURSE PRACTITIONER

## 2022-03-24 PROCEDURE — 83630 LACTOFERRIN FECAL (QUAL): CPT

## 2022-03-24 PROCEDURE — 87209 SMEAR COMPLEX STAIN: CPT

## 2022-03-24 PROCEDURE — 87506 IADNA-DNA/RNA PROBE TQ 6-11: CPT

## 2022-03-24 PROCEDURE — 87015 SPECIMEN INFECT AGNT CONCNTJ: CPT

## 2022-03-24 PROCEDURE — 87210 SMEAR WET MOUNT SALINE/INK: CPT

## 2022-03-24 PROCEDURE — 83993 ASSAY FOR CALPROTECTIN FECAL: CPT

## 2022-03-24 PROCEDURE — 87338 HPYLORI STOOL AG IA: CPT

## 2022-03-24 RX ORDER — FUROSEMIDE 20 MG/1
20 TABLET ORAL DAILY
Qty: 10 TABLET | Refills: 0 | Status: SHIPPED | OUTPATIENT
Start: 2022-03-24 | End: 2022-06-08 | Stop reason: ALTCHOICE

## 2022-03-24 RX ORDER — BACLOFEN 10 MG/1
20 TABLET ORAL 3 TIMES DAILY
Qty: 270 TABLET | Refills: 1 | Status: SHIPPED | OUTPATIENT
Start: 2022-03-24 | End: 2022-08-11 | Stop reason: SDUPTHER

## 2022-03-24 RX ORDER — OMEPRAZOLE 20 MG/1
20 CAPSULE, DELAYED RELEASE ORAL DAILY
COMMUNITY
End: 2022-08-11

## 2022-03-24 RX ORDER — DICYCLOMINE HYDROCHLORIDE 10 MG/1
10 CAPSULE ORAL
COMMUNITY
End: 2022-08-11

## 2022-03-24 RX ORDER — MELOXICAM 15 MG/1
15 TABLET ORAL DAILY
Qty: 30 TABLET | Refills: 3 | Status: SHIPPED | OUTPATIENT
Start: 2022-03-24 | End: 2022-07-19

## 2022-03-24 ASSESSMENT — PATIENT HEALTH QUESTIONNAIRE - PHQ9
10. IF YOU CHECKED OFF ANY PROBLEMS, HOW DIFFICULT HAVE THESE PROBLEMS MADE IT FOR YOU TO DO YOUR WORK, TAKE CARE OF THINGS AT HOME, OR GET ALONG WITH OTHER PEOPLE: 0
5. POOR APPETITE OR OVEREATING: 0
4. FEELING TIRED OR HAVING LITTLE ENERGY: 0
7. TROUBLE CONCENTRATING ON THINGS, SUCH AS READING THE NEWSPAPER OR WATCHING TELEVISION: 0
SUM OF ALL RESPONSES TO PHQ QUESTIONS 1-9: 0
SUM OF ALL RESPONSES TO PHQ QUESTIONS 1-9: 0
6. FEELING BAD ABOUT YOURSELF - OR THAT YOU ARE A FAILURE OR HAVE LET YOURSELF OR YOUR FAMILY DOWN: 0
2. FEELING DOWN, DEPRESSED OR HOPELESS: 0
SUM OF ALL RESPONSES TO PHQ9 QUESTIONS 1 & 2: 0
SUM OF ALL RESPONSES TO PHQ QUESTIONS 1-9: 0
1. LITTLE INTEREST OR PLEASURE IN DOING THINGS: 0
9. THOUGHTS THAT YOU WOULD BE BETTER OFF DEAD, OR OF HURTING YOURSELF: 0
8. MOVING OR SPEAKING SO SLOWLY THAT OTHER PEOPLE COULD HAVE NOTICED. OR THE OPPOSITE, BEING SO FIGETY OR RESTLESS THAT YOU HAVE BEEN MOVING AROUND A LOT MORE THAN USUAL: 0
SUM OF ALL RESPONSES TO PHQ QUESTIONS 1-9: 0
3. TROUBLE FALLING OR STAYING ASLEEP: 0

## 2022-03-24 ASSESSMENT — ENCOUNTER SYMPTOMS
GASTROINTESTINAL NEGATIVE: 1
ALLERGIC/IMMUNOLOGIC NEGATIVE: 1
SHORTNESS OF BREATH: 1
BACK PAIN: 1

## 2022-03-24 NOTE — PROGRESS NOTES
[facility]  Marian Bowden, [address] [facility] PRIMARY CARE  [address]  Dept: [phone]  Dept Fax: [phone]      Name: Julián Donovan  : 1970         Chief Complaint:     Chief Complaint   Patient presents with    ED Follow-up     Went to ED with SOB and leg swelling. Patient feeling better. Patient has changed diet and not consuming as much Sodium. Wants to try Lasix. History of Present Illness:      Julián Donovan is a 54 y.o.  female who presents with ED Follow-up Tiffanie Thompson to ED with SOB and leg swelling. Patient feeling better. Patient has changed diet and not consuming as much Sodium. Wants to try Lasix. )    Cory Lau is here today for ED follow up. She went to the ER for generalized swelling. She states she had fluid coming out of her legs. She also had face swelling when she went to the ER. She just now is watching her diet. She has decreased her sodium intake and raising her feet at night. She states she has had a lot of improvement in her swelling. Denies chest pain at this time, she did however have chest pain when she went to the ER and states she will get intermittent chest pain. She is having shortness of breath with exertion. She states while laying in bed she will have shortness of breath. She has seen a Cardiologist in the past that wanted her to do stress test that she never did due to finances. She does not want to do Cardiologist visit at this time as she states she is waiting to get insurance. She states she has been told she may have CHF in the past and has never followed up with recommendations. Past Medical History:     Past Medical History:   Diagnosis Date    Anxiety     Depression     Family history of thyroid problem     Hypertension     Osteoarthritis       Reviewed all health maintenance requirements and ordered appropriate tests  There are no preventive care reminders to display for this patient.     Past Surgical History:     Past Surgical History:   Procedure Laterality Date    CHOLECYSTECTOMY          Medications:       Prior to Admission medications    Medication Sig Start Date End Date Taking? Authorizing Provider   baclofen (LIORESAL) 10 MG tablet Take 2 tablets by mouth 3 times daily 3/24/22  Yes REBEKAH Garcia CNP   meloxicam (MOBIC) 15 MG tablet Take 1 tablet by mouth daily 3/24/22  Yes REBEKAH Garcia CNP   dicyclomine (BENTYL) 10 MG capsule Take 10 mg by mouth 4 times daily (before meals and nightly)   Yes Historical Provider, MD   omeprazole (PRILOSEC) 20 MG delayed release capsule Take 20 mg by mouth daily   Yes Historical Provider, MD   furosemide (LASIX) 20 MG tablet Take 1 tablet by mouth daily for 10 days 3/24/22 4/3/22 Yes REBEKAH Garcia CNP   ARIPiprazole (ABILIFY) 15 MG tablet take 1 tablet by mouth once daily 3/11/22  Yes REBEKAH Garcia CNP   traMADol (ULTRAM) 50 MG tablet Take 1 tablet by mouth Daily for 30 days. 3/10/22 4/9/22 Yes Kerri Alonzo MD   albuterol sulfate HFA (VENTOLIN HFA) 108 (90 Base) MCG/ACT inhaler Inhale 2 puffs into the lungs 4 times daily as needed for Wheezing 2/24/22  Yes REBEKAH Mitchell CNP   gabapentin (NEURONTIN) 300 MG capsule Take 1 capsule by mouth 3 times daily for 30 days. Intended supply: 90 days 2/17/22 3/24/22 Yes Kerri Alonzo MD   lisinopril-hydroCHLOROthiazide (PRINZIDE;ZESTORETIC) 10-12.5 MG per tablet Take 1 tablet by mouth daily 1/11/22  Yes REBEKAH Garcia CNP   acetaminophen (TYLENOL) 500 MG tablet Take 500 mg by mouth every 6 hours as needed for Pain    Yes Historical Provider, MD        Allergies: Other    Social History:     Tobacco:    reports that she has been smoking cigarettes. She has been smoking about 1.00 pack per day. She has never used smokeless tobacco.  Alcohol:      reports current alcohol use. Drug Use:  reports previous drug use.     Family History: Family History   Problem Relation Age of Onset    Other Mother     Diabetes Father     Heart Disease Father     Cancer Maternal Grandfather        Review of Systems:     Positive and Negative as described in HPI    Review of Systems   Constitutional: Negative. HENT: Negative. Respiratory: Positive for shortness of breath. Cardiovascular: Positive for leg swelling. Negative for chest pain and palpitations. Gastrointestinal: Negative. Endocrine: Negative. Genitourinary: Negative. Musculoskeletal: Positive for back pain. Skin: Negative. Allergic/Immunologic: Negative. Neurological: Negative. Hematological: Negative. Psychiatric/Behavioral: Negative. Physical Exam:   Vitals:  /78   Pulse 97   Temp 97.3 °F (36.3 °C) (Temporal)   Resp 18   Wt 265 lb 9.6 oz (120.5 kg)   LMP 07/27/2020   SpO2 95%   BMI 51.87 kg/m²     Physical Exam  Vitals and nursing note reviewed. Constitutional:       Appearance: Normal appearance. HENT:      Head: Normocephalic. Right Ear: External ear normal.      Left Ear: External ear normal.      Nose: Nose normal.      Mouth/Throat:      Mouth: Mucous membranes are moist.      Pharynx: Oropharynx is clear. Eyes:      Conjunctiva/sclera: Conjunctivae normal.      Pupils: Pupils are equal, round, and reactive to light. Cardiovascular:      Rate and Rhythm: Normal rate and regular rhythm. Heart sounds: Normal heart sounds. Pulmonary:      Effort: Pulmonary effort is normal. No respiratory distress. Breath sounds: Normal breath sounds. Musculoskeletal:         General: Normal range of motion. Cervical back: Normal range of motion. Right lower leg: Normal. No edema. Left lower leg: Normal. No edema. Right ankle: Swelling (non-pitting) present. Left ankle: Swelling (non-pitting) present. Right foot: Swelling (non-pitting) present. Left foot: Swelling (non-pitting) present.    Skin: General: Skin is warm. Capillary Refill: Capillary refill takes less than 2 seconds. Neurological:      General: No focal deficit present. Mental Status: She is alert and oriented to person, place, and time. Psychiatric:         Mood and Affect: Mood normal.         Data:     Lab Results   Component Value Date     03/21/2022    K 3.8 03/21/2022    CL 99 03/21/2022    CO2 25 03/21/2022    BUN 14 03/21/2022    CREATININE 0.73 03/21/2022    GLUCOSE 102 03/21/2022    PROT 7.3 03/21/2022    LABALBU 4.1 03/21/2022    BILITOT 0.16 03/21/2022    ALKPHOS 109 03/21/2022    AST 23 03/21/2022    ALT 25 03/21/2022     Lab Results   Component Value Date    WBC 11.6 03/21/2022    RBC 5.12 03/21/2022    HGB 16.1 03/21/2022    HCT 48.2 03/21/2022    MCV 94.1 03/21/2022    MCH 31.4 03/21/2022    MCHC 33.4 03/21/2022    RDW 14.1 03/21/2022     03/21/2022    MPV 9.5 03/21/2022     Lab Results   Component Value Date    TSH 2.65 03/21/2022     Lab Results   Component Value Date    CHOL 226 05/20/2021    HDL 61 05/20/2021    LABA1C 5.9 05/20/2021       Assessment/Plan:      Diagnosis Orders   1. Follow-up examination     2. Peripheral edema  furosemide (LASIX) 20 MG tablet   3. Acute right-sided low back pain with right-sided sciatica  baclofen (LIORESAL) 10 MG tablet    meloxicam (MOBIC) 15 MG tablet     Peripheral Edema/ED follow up-Discussed at length with patient today about need for Cardiology consult and concern for congestive heart failure. Pt. refuses to go until she hears about her insurance. She is instructed to call office as soon as she gets insurance which she feels may be in 2 weeks. She is given Lasix today to use for peripheral edema when needed. She is to continue restricting sodium from her diet and elevate legs. She is instructed to return to ER with chest pain and worsening edema. Will continue to closely monitor.     1.  Richad Oms received counseling on the following healthy behaviors: nutrition, exercise and medication adherence  2. Patient given educational materials - see patient instructions  3. Was a self-tracking handout given in paper form or via [employer]? No  If yes, see orders or list here. 4.  Discussed use, benefit, and side effects of prescribed medications. Barriers to medication compliance addressed. All patient questions answered. Pt voiced understanding. 5.  Reviewed prior labs and health maintenance  6. Continue current medications, diet and exercise. Completed Refills   Requested Prescriptions     Signed Prescriptions Disp Refills    baclofen (LIORESAL) 10 MG tablet 270 tablet 1     Sig: Take 2 tablets by mouth 3 times daily    meloxicam (MOBIC) 15 MG tablet 30 tablet 3     Sig: Take 1 tablet by mouth daily    furosemide (LASIX) 20 MG tablet 10 tablet 0     Sig: Take 1 tablet by mouth daily for 10 days         Return in about 3 months (around 6/24/2022).

## 2022-03-25 LAB
CAMPYLOBACTER PCR: NORMAL
DIRECT EXAM: NEGATIVE
E COLI ENTEROTOXIGENIC PCR: NORMAL
LACTOFERRIN, QUAL: NORMAL
MICRO OVA & PARASITES: NORMAL
MICRO OVA & PARASITES: NORMAL
PLESIOMONAS SHIGELLOIDES PCR: NORMAL
SALMONELLA PCR: NORMAL
SHIGATOXIN GENE PCR: NORMAL
SHIGELLA SP PCR: NORMAL
SPECIMEN DESCRIPTION: NORMAL
VIBRIO PCR: NORMAL
YERSINIA ENTEROCOLITICA PCR: NORMAL

## 2022-03-26 LAB — CALPROTECTIN, FECAL: 11 UG/G

## 2022-04-20 NOTE — PATIENT INSTRUCTIONS
SURVEY:     You may be receiving a survey from [email or web address] regarding your visit today. Please complete the survey to enable us to provide the highest quality of care to you and your family. If you cannot score us a very good on any question, please call the office to discuss how we could have made your experience a very good one. Thank you.   Daya Rothman, APRN-ANDREW Zheng, CNP  Lajuan Kussmaul, LPN  Jael Wolff, LPN  Meg Cosme, CMA  Regina Shah, YOLANDA Botello, CMA  Cookie, PCA
Status post  section. Uncomplicated recovery. Follow up with Dr. Crowder.

## 2022-05-13 NOTE — TELEPHONE ENCOUNTER
Health Maintenance   Topic Date Due    A1C test (Diabetic or Prediabetic)  05/20/2022    DTaP/Tdap/Td vaccine (1 - Tdap) 05/14/2022 (Originally 11/17/1991)    Pneumococcal 0-64 years Vaccine (1 - PCV) 05/14/2022 (Originally 11/17/1978)    Hepatitis C screen  05/14/2022 (Originally 11/17/1990)    HIV screen  05/14/2022 (Originally 11/17/1987)    Flu vaccine (Season Ended) 11/24/2022 (Originally 9/1/2022)    Cervical cancer screen  11/24/2022 (Originally 11/17/1993)    COVID-19 Vaccine (3 - Booster for Pfizer series) 03/24/2023 (Originally 3/4/2022)    Colorectal Cancer Screen  02/15/2023    Depression Monitoring  03/24/2023    Lipids  05/20/2026    Hepatitis A vaccine  Aged Out    Hepatitis B vaccine  Aged Out    Hib vaccine  Aged Out    Meningococcal (ACWY) vaccine  Aged Out             (applicable per patient's age: Cancer Screenings, Depression Screening, Fall Risk Screening, Immunizations)    Hemoglobin A1C (%)   Date Value   05/20/2021 5.9   05/14/2021 5.6     LDL Cholesterol (mg/dL)   Date Value   05/20/2021 136 (H)     AST (U/L)   Date Value   03/21/2022 23     ALT (U/L)   Date Value   03/21/2022 25     BUN (mg/dL)   Date Value   03/21/2022 14      (goal A1C is < 7)   (goal LDL is <100) need 30-50% reduction from baseline     BP Readings from Last 3 Encounters:   03/24/22 122/78   03/22/22 (!) 118/51   02/24/22 122/70    (goal /80)      All Future Testing planned in [facility]:  Lab Frequency Next Occurrence   Full PFT Study With Bronchodilator Once 05/26/2021       Next Visit Date:  Future Appointments   Date Time Provider Pao Elias   5/19/2022  4:00 PM Eunice Villavicencio MD [facility]   6/30/2022  4:30 PM Luis Rosenthal APRN - CNP [facility]            Patient Active Problem List:     Major depressive disorder, recurrent episode with anxious distress ([facility] [address]

## 2022-05-16 RX ORDER — ARIPIPRAZOLE 15 MG/1
TABLET ORAL
Qty: 90 TABLET | Refills: 1 | Status: SHIPPED | OUTPATIENT
Start: 2022-05-16 | End: 2022-11-02

## 2022-05-16 RX ORDER — ARIPIPRAZOLE 15 MG/1
TABLET ORAL
Qty: 30 TABLET | Refills: 1 | OUTPATIENT
Start: 2022-05-16

## 2022-06-08 ENCOUNTER — OFFICE VISIT (OUTPATIENT)
Dept: PRIMARY CARE CLINIC | Age: 52
End: 2022-06-08
Payer: MEDICAID

## 2022-06-08 VITALS
SYSTOLIC BLOOD PRESSURE: 126 MMHG | RESPIRATION RATE: 18 BRPM | DIASTOLIC BLOOD PRESSURE: 66 MMHG | TEMPERATURE: 98 F | BODY MASS INDEX: 52.11 KG/M2 | WEIGHT: 266.8 LBS | HEART RATE: 96 BPM | OXYGEN SATURATION: 97 %

## 2022-06-08 DIAGNOSIS — R60.9 PERIPHERAL EDEMA: Primary | ICD-10-CM

## 2022-06-08 DIAGNOSIS — I10 ESSENTIAL HYPERTENSION: ICD-10-CM

## 2022-06-08 DIAGNOSIS — R07.9 CHEST PAIN, UNSPECIFIED TYPE: ICD-10-CM

## 2022-06-08 DIAGNOSIS — R60.0 EDEMA OF BOTH LOWER LEGS: ICD-10-CM

## 2022-06-08 DIAGNOSIS — E03.9 HYPOTHYROIDISM, UNSPECIFIED TYPE: ICD-10-CM

## 2022-06-08 PROCEDURE — 99214 OFFICE O/P EST MOD 30 MIN: CPT | Performed by: NURSE PRACTITIONER

## 2022-06-08 RX ORDER — SPIRONOLACTONE 25 MG/1
25 TABLET ORAL DAILY
Qty: 30 TABLET | Refills: 3 | Status: SHIPPED | OUTPATIENT
Start: 2022-06-08 | End: 2022-10-06

## 2022-06-08 RX ORDER — AMLODIPINE BESYLATE 5 MG/1
5 TABLET ORAL DAILY
Qty: 30 TABLET | Refills: 3 | Status: SHIPPED | OUTPATIENT
Start: 2022-06-08 | End: 2022-07-01 | Stop reason: ALTCHOICE

## 2022-06-08 SDOH — ECONOMIC STABILITY: FOOD INSECURITY: WITHIN THE PAST 12 MONTHS, YOU WORRIED THAT YOUR FOOD WOULD RUN OUT BEFORE YOU GOT MONEY TO BUY MORE.: NEVER TRUE

## 2022-06-08 SDOH — ECONOMIC STABILITY: FOOD INSECURITY: WITHIN THE PAST 12 MONTHS, THE FOOD YOU BOUGHT JUST DIDN'T LAST AND YOU DIDN'T HAVE MONEY TO GET MORE.: NEVER TRUE

## 2022-06-08 ASSESSMENT — PATIENT HEALTH QUESTIONNAIRE - PHQ9
SUM OF ALL RESPONSES TO PHQ9 QUESTIONS 1 & 2: 0
3. TROUBLE FALLING OR STAYING ASLEEP: 0
SUM OF ALL RESPONSES TO PHQ QUESTIONS 1-9: 0
SUM OF ALL RESPONSES TO PHQ QUESTIONS 1-9: 0
1. LITTLE INTEREST OR PLEASURE IN DOING THINGS: 0
SUM OF ALL RESPONSES TO PHQ QUESTIONS 1-9: 0
5. POOR APPETITE OR OVEREATING: 0
SUM OF ALL RESPONSES TO PHQ QUESTIONS 1-9: 0
6. FEELING BAD ABOUT YOURSELF - OR THAT YOU ARE A FAILURE OR HAVE LET YOURSELF OR YOUR FAMILY DOWN: 0
8. MOVING OR SPEAKING SO SLOWLY THAT OTHER PEOPLE COULD HAVE NOTICED. OR THE OPPOSITE, BEING SO FIGETY OR RESTLESS THAT YOU HAVE BEEN MOVING AROUND A LOT MORE THAN USUAL: 0
7. TROUBLE CONCENTRATING ON THINGS, SUCH AS READING THE NEWSPAPER OR WATCHING TELEVISION: 0
10. IF YOU CHECKED OFF ANY PROBLEMS, HOW DIFFICULT HAVE THESE PROBLEMS MADE IT FOR YOU TO DO YOUR WORK, TAKE CARE OF THINGS AT HOME, OR GET ALONG WITH OTHER PEOPLE: 0
4. FEELING TIRED OR HAVING LITTLE ENERGY: 0
2. FEELING DOWN, DEPRESSED OR HOPELESS: 0
9. THOUGHTS THAT YOU WOULD BE BETTER OFF DEAD, OR OF HURTING YOURSELF: 0

## 2022-06-08 ASSESSMENT — SOCIAL DETERMINANTS OF HEALTH (SDOH): HOW HARD IS IT FOR YOU TO PAY FOR THE VERY BASICS LIKE FOOD, HOUSING, MEDICAL CARE, AND HEATING?: NOT HARD AT ALL

## 2022-06-08 NOTE — PATIENT INSTRUCTIONS
SURVEY:     You may be receiving a survey from [employer] regarding your visit today. Please complete the survey to enable us to provide the highest quality of care to you and your family. If you cannot score us a very good on any question, please call the office to discuss how we could have made your experience a very good one. Thank you.   Young Rothman, APRN-ANDREW Rosado, ANDREW Santiago, GAIL Bentley, YOLANDA George, YOLANDA Botello, CMA  Cookie, PCA

## 2022-06-08 NOTE — PROGRESS NOTES
[facility]  Syed Valenzuela, [address] [facility] PRIMARY [facility]ARE  [address]  Dept: [phone]  Dept Fax: [phone]      Name: Melo Batres  : 1970         Chief Complaint:     Chief Complaint   Patient presents with    Leg Swelling     3 month f/u. Patient doing better but states that swelling nbuilds up at the ankles. History of Present Illness:      Melo Batres is a 54 y.o.  female who presents with Leg Swelling (3 month f/u. Patient doing better but states that swelling nbuilds up at the ankles. )    Ady Alicea is here today for swelling. She continues to have bilateral leg swelling including her ankles, feet, and lower legs. She states she is having swelling in her face and her abdomen. She states sometimes she is having bilateral hand swelling but she believes that is from the carpal tunnel. She has wrist splits for her carpal tunnel. She has taken Lasix and when she does it helps with the swelling. She states she looked up Lasix and does not want to take it due to side effects she read about. She has not tried the compression stockings. She states that the swelling gets worse in the evening. She states the swelling is gone in the morning. She does state she continues to follow a low sodium diet. She is having shortness of breath with activity. She has had some chest pain. She describes it as [place] little twinge every now and then\". She denies chest pain at this time. She states she used to be on Synthroid and has not been taking it for the last 3 years.       Past Medical History:     Past Medical History:   Diagnosis Date    Anxiety     Depression     Family history of thyroid problem     Hypertension     Osteoarthritis       Reviewed all health maintenance requirements and ordered appropriate tests  Health Maintenance Due   Topic Date Due    A1C test (Diabetic or Prediabetic)  2022       Past Surgical History:     Past than 2 seconds. Neurological:      General: No focal deficit present. Mental Status: She is alert and oriented to person, place, and time. Psychiatric:         Mood and Affect: Mood normal.         Behavior: Behavior normal.         Data:     Lab Results   Component Value Date     03/21/2022    K 3.8 03/21/2022    CL 99 03/21/2022    CO2 25 03/21/2022    BUN 14 03/21/2022    CREATININE 0.73 03/21/2022    GLUCOSE 102 03/21/2022    PROT 7.3 03/21/2022    LABALBU 4.1 03/21/2022    BILITOT 0.16 03/21/2022    ALKPHOS 109 03/21/2022    AST 23 03/21/2022    ALT 25 03/21/2022     Lab Results   Component Value Date    WBC 11.6 03/21/2022    RBC 5.12 03/21/2022    HGB 16.1 03/21/2022    HCT 48.2 03/21/2022    MCV 94.1 03/21/2022    MCH 31.4 03/21/2022    MCHC 33.4 03/21/2022    RDW 14.1 03/21/2022     03/21/2022    MPV 9.5 03/21/2022     Lab Results   Component Value Date    TSH 2.65 03/21/2022     Lab Results   Component Value Date    CHOL 226 05/20/2021    HDL 61 05/20/2021    LABA1C 5.9 05/20/2021       Assessment/Plan:      Diagnosis Orders   1. Peripheral edema  spironolactone (ALDACTONE) 25 MG tablet    Awilda Chapman MD, Cardiology, [place]   2. Edema of both lower legs  Elastic Bandages & Supports (MEDICAL COMPRESSION STOCKINGS) MISC    spironolactone (ALDACTONE) 25 MG tablet    CBC    Basic Metabolic Panel    Awilda Chapman MD, Cardiology, [place]   3. Hypothyroidism, unspecified type  TSH With Reflex Ft4   4. Essential hypertension  amLODIPine (NORVASC) 5 MG tablet   5. Chest pain, unspecified type  Erum Mendes MD, Cardiology, [place]     Peripheral Edema-Aldactone 25 mg daily. Labs ordered and will call with results. Compression stockings to be worn during the day. Elevate feet at night. Discontinue Lisinopril-hydrochlorothiazide. Cardiology referral.    Hypothyroidism-TSH and will call with results. Essential hypertension-Amlodipine 5 mg daily.       Chest Pain, unspecified-Cardiology referral.    1.  Cyndi Barajas received counseling on the following healthy behaviors: nutrition, exercise and medication adherence  2. Patient given educational materials - see patient instructions  3. Was a self-tracking handout given in paper form or via [employer]? No  If yes, see orders or list here. 4.  Discussed use, benefit, and side effects of prescribed medications. Barriers to medication compliance addressed. All patient questions answered. Pt voiced understanding. 5.  Reviewed prior labs and health maintenance  6. Continue current medications, diet and exercise. Completed Refills   Requested Prescriptions     Signed Prescriptions Disp Refills    Elastic Bandages & Supports (MEDICAL COMPRESSION STOCKINGS) MISC 1 each 0     Si each by Does not apply route daily as needed (Wear Daily)    spironolactone (ALDACTONE) 25 MG tablet 30 tablet 3     Sig: Take 1 tablet by mouth daily    amLODIPine (NORVASC) 5 MG tablet 30 tablet 3     Sig: Take 1 tablet by mouth daily         Return in about 2 weeks (around 2022).

## 2022-06-09 ENCOUNTER — TELEPHONE (OUTPATIENT)
Dept: PRIMARY CARE CLINIC | Age: 52
End: 2022-06-09

## 2022-06-09 ASSESSMENT — ENCOUNTER SYMPTOMS
ALLERGIC/IMMUNOLOGIC NEGATIVE: 1
EYES NEGATIVE: 1
GASTROINTESTINAL NEGATIVE: 1
RESPIRATORY NEGATIVE: 1

## 2022-06-10 ENCOUNTER — HOSPITAL ENCOUNTER (OUTPATIENT)
Age: 52
Discharge: HOME | End: 2022-06-10
Payer: MEDICAID

## 2022-06-10 DIAGNOSIS — E03.9 HYPOTHYROIDISM, UNSPECIFIED TYPE: ICD-10-CM

## 2022-06-10 DIAGNOSIS — R60.0 EDEMA OF BOTH LOWER LEGS: ICD-10-CM

## 2022-06-10 LAB
ANION GAP SERPL CALCULATED.3IONS-SCNC: 10 MMOL/L (ref 9–17)
BUN BLDV-MCNC: 11 MG/DL (ref 6–20)
BUN/CREAT BLD: 15 (ref 9–20)
CALCIUM SERPL-MCNC: 9.9 MG/DL (ref 8.6–10.4)
CHLORIDE BLD-SCNC: 100 MMOL/L (ref 98–107)
CO2: 24 MMOL/L (ref 20–31)
CREAT SERPL-MCNC: 0.74 MG/DL (ref 0.5–0.9)
GFR AFRICAN AMERICAN: >60 ML/MIN
GFR NON-AFRICAN AMERICAN: >60 ML/MIN
GFR SERPL CREATININE-BSD FRML MDRD: ABNORMAL ML/MIN/{1.73_M2}
GFR SERPL CREATININE-BSD FRML MDRD: ABNORMAL ML/MIN/{1.73_M2}
GLUCOSE BLD-MCNC: 89 MG/DL (ref 70–99)
HCT VFR BLD CALC: 49 % (ref 36.3–47.1)
HEMOGLOBIN: 15.9 G/DL (ref 11.9–15.1)
MCH RBC QN AUTO: 30.2 PG (ref 25.2–33.5)
MCHC RBC AUTO-ENTMCNC: 32.4 G/DL (ref 28.4–34.8)
MCV RBC AUTO: 93.2 FL (ref 82.6–102.9)
NRBC AUTOMATED: 0 PER 100 WBC
PDW BLD-RTO: 14.7 % (ref 11.8–14.4)
PLATELET # BLD: 322 K/UL (ref 138–453)
PMV BLD AUTO: 9.5 FL (ref 8.1–13.5)
POTASSIUM SERPL-SCNC: 4.5 MMOL/L (ref 3.7–5.3)
RBC # BLD: 5.26 M/UL (ref 3.95–5.11)
SODIUM BLD-SCNC: 134 MMOL/L (ref 135–144)
TSH SERPL DL<=0.05 MIU/L-ACNC: 1.71 UIU/ML (ref 0.3–5)
WBC # BLD: 8.6 K/UL (ref 3.5–11.3)

## 2022-06-10 PROCEDURE — 84443 ASSAY THYROID STIM HORMONE: CPT

## 2022-06-10 PROCEDURE — 85027 COMPLETE CBC AUTOMATED: CPT

## 2022-06-10 PROCEDURE — 80048 BASIC METABOLIC PNL TOTAL CA: CPT

## 2022-06-10 PROCEDURE — 36415 COLL VENOUS BLD VENIPUNCTURE: CPT

## 2022-06-14 ENCOUNTER — TELEPHONE (OUTPATIENT)
Dept: PRIMARY CARE CLINIC | Age: 52
End: 2022-06-14

## 2022-07-01 ENCOUNTER — OFFICE VISIT (OUTPATIENT)
Dept: CARDIOLOGY | Age: 52
End: 2022-07-01
Payer: MEDICAID

## 2022-07-01 ENCOUNTER — HOSPITAL ENCOUNTER (OUTPATIENT)
Age: 52
Discharge: HOME | End: 2022-07-01
Payer: MEDICAID

## 2022-07-01 VITALS
DIASTOLIC BLOOD PRESSURE: 78 MMHG | OXYGEN SATURATION: 95 % | HEART RATE: 99 BPM | SYSTOLIC BLOOD PRESSURE: 128 MMHG | HEIGHT: 60 IN | BODY MASS INDEX: 51.91 KG/M2 | RESPIRATION RATE: 18 BRPM | WEIGHT: 264.4 LBS

## 2022-07-01 DIAGNOSIS — R06.02 SOB (SHORTNESS OF BREATH): ICD-10-CM

## 2022-07-01 DIAGNOSIS — R00.2 PALPITATION: ICD-10-CM

## 2022-07-01 DIAGNOSIS — E66.01 MORBID OBESITY (HCC): ICD-10-CM

## 2022-07-01 DIAGNOSIS — Z71.6 TOBACCO ABUSE COUNSELING: ICD-10-CM

## 2022-07-01 DIAGNOSIS — R06.02 SOB (SHORTNESS OF BREATH): Primary | ICD-10-CM

## 2022-07-01 DIAGNOSIS — R73.03 PRE-DIABETES: ICD-10-CM

## 2022-07-01 DIAGNOSIS — I10 PRIMARY HYPERTENSION: ICD-10-CM

## 2022-07-01 DIAGNOSIS — R42 DIZZINESS: ICD-10-CM

## 2022-07-01 DIAGNOSIS — R07.89 ATYPICAL CHEST PAIN: ICD-10-CM

## 2022-07-01 DIAGNOSIS — R53.83 OTHER FATIGUE: ICD-10-CM

## 2022-07-01 DIAGNOSIS — R06.83 SNORING: ICD-10-CM

## 2022-07-01 DIAGNOSIS — G47.33 OSA (OBSTRUCTIVE SLEEP APNEA): ICD-10-CM

## 2022-07-01 LAB
CHOLESTEROL/HDL RATIO: 3.9
CHOLESTEROL: 213 MG/DL
HDLC SERPL-MCNC: 55 MG/DL
LDL CHOLESTEROL: 110 MG/DL (ref 0–130)
PRO-BNP: 24 PG/ML
TRIGL SERPL-MCNC: 242 MG/DL

## 2022-07-01 PROCEDURE — 83880 ASSAY OF NATRIURETIC PEPTIDE: CPT

## 2022-07-01 PROCEDURE — 99202 OFFICE O/P NEW SF 15 MIN: CPT | Performed by: PHYSICIAN ASSISTANT

## 2022-07-01 PROCEDURE — 80061 LIPID PANEL: CPT

## 2022-07-01 PROCEDURE — 36415 COLL VENOUS BLD VENIPUNCTURE: CPT

## 2022-07-01 PROCEDURE — 83036 HEMOGLOBIN GLYCOSYLATED A1C: CPT

## 2022-07-01 PROCEDURE — 99204 OFFICE O/P NEW MOD 45 MIN: CPT | Performed by: PHYSICIAN ASSISTANT

## 2022-07-01 RX ORDER — CARVEDILOL 6.25 MG/1
6.25 TABLET ORAL 2 TIMES DAILY
Qty: 180 TABLET | Refills: 3 | Status: SHIPPED | OUTPATIENT
Start: 2022-07-01

## 2022-07-01 NOTE — PATIENT INSTRUCTIONS
SURVEY:    You may be receiving a survey from [employer] regarding your visit today. Please complete the survey to enable us to provide the highest quality of care to you and your family. If you cannot score us a very good on any question, please call the office to discuss how we could have made your experience a very good one. Thank you.

## 2022-07-01 NOTE — PROGRESS NOTES
Saba Bonilla am scribing for and in the presence of Dilia Gilliam, [place]. Patient: Zhane Ann  : 1970  Date of Visit: 2022    REASON FOR VISIT / CONSULTATION: Establish Cardiologist (Hx: HTN, anxiety. pt is here referred by thien with edema and CP. has had edema for 15 years. has been on lasix intermittently. her breathing is very bad, when she lays down she cant catch her breath she she was pressure on her chest. dizziness a lot. no falls or near falls. palpitations, daily. syncope years ago) and Other (saw cardiologist once in [facility])      History of Present Illness:      Dear Benjamín Puente, APRN - CNP    I had the pleasure of seeing Zhane Ann, who is a 54 y.o. female with a history of edema and chest pain. She reports a history of suspected heart failure, she did see a Cardiologist one time in [place] but did not continue to follow up and never received a diagnosis. She does have hypertension. No history of hyperlipidemia. She did have gestational diabetes with all three of her children. No other issues during pregnancy. No diabetes now., last HgA1C was borderline at 5.9 2021. She is a current everyday smoker of 1 PPD for the last 28 years. Her family history includes her father had a heart attack and had open heart surgery. He  in his 63's. Holter done 2021: Sinus rhythm with average HR of 97 bpm, ranging between 73 and 141 bpm, sinus tachycardia represnted 34% of the study  duration. Rare isolated PACsand PVCs noted. Ms. Brooke Dawson is here today to establish care. She has had chest pain for about 3 years. When she tries to sleep at night she cannot breathe at night. It feels tight in her chest, no radiation of the pain. It wakes her up and she switches positions, this helps the chest pain resolve. It lasts minutes at a time. She does see GI and was treated with omeprazole but ran out of her medication 1 month ago.  She does have shortness of breath with exertion. Walking to our office caused shortness of breath. She does always have dizziness and palpitations. The palpitations last 15- 30 minutes. She does snore at night, she does wake up coughing and choking. She wakes up feeling fatigued. She does drink two pots of coffee and two glasses of diet pepsi. No current drug use. She drinks 3-4 bottles of water daily. PAST MEDICAL HISTORY:        Past Medical History:   Diagnosis Date    Anxiety     Depression     Family history of thyroid problem     Hypertension     Osteoarthritis        CURRENT ALLERGIES: Other REVIEW OF SYSTEMS: 14 systems were reviewed. Pertinent positives and negatives as above, all else negative.      Past Surgical History:   Procedure Laterality Date    CHOLECYSTECTOMY      Social History:  Social History     Tobacco Use    Smoking status: Current Every Day Smoker     Packs/day: 1.00     Years: 28.00     Pack years: 28.00     Types: Cigarettes    Smokeless tobacco: Never Used   Vaping Use    Vaping Use: Never used   Substance Use Topics    Alcohol use: Yes     Comment: occ    Drug use: Not Currently        CURRENT MEDICATIONS:        Outpatient Medications Marked as Taking for the 7/1/22 encounter (Office Visit) with Casandra Cook PA-C   Medication Sig Dispense Refill    carvedilol (COREG) 6.25 MG tablet Take 1 tablet by mouth 2 times daily 180 tablet 3    Elastic Bandages & Supports (MEDICAL COMPRESSION STOCKINGS) MISC 1 each by Does not apply route daily as needed (Wear Daily) 1 each 0    spironolactone (ALDACTONE) 25 MG tablet Take 1 tablet by mouth daily 30 tablet 3    ARIPiprazole (ABILIFY) 15 MG tablet take 1 tablet by mouth once daily 90 tablet 1    baclofen (LIORESAL) 10 MG tablet Take 2 tablets by mouth 3 times daily 270 tablet 1    meloxicam (MOBIC) 15 MG tablet Take 1 tablet by mouth daily 30 tablet 3    albuterol sulfate HFA (VENTOLIN HFA) 108 (90 Base) MCG/ACT inhaler Inhale 2 puffs into the lungs 4 times daily as needed for Wheezing 54 g 1    gabapentin (NEURONTIN) 300 MG capsule Take 1 capsule by mouth 3 times daily for 30 days. Intended supply: 90 days 90 capsule 5       FAMILY HISTORY: family history includes Cancer in her maternal grandfather; Diabetes in her father; Heart Disease (age of onset: 27) in her father; Other in her mother. Physical Examination:     /78 (Site: Right Upper Arm, Position: Standing, Cuff Size: Large Adult)   Pulse 99   Resp 18   Ht 5' (1.524 m)   Wt 264 lb 6.4 oz (119.9 kg)   LMP 07/27/2020   SpO2 95%   BMI 51.64 kg/m²  Body mass index is 51.64 kg/m². Constitutional: She is oriented to person, place, and time. She appears well-developed and well-nourished. In no acute distress. HEENT: Normocephalic and atraumatic. No JVD present. Carotid bruit is not present. No mass and no thyromegaly present. No lymphadenopathy present. Cardiovascular: Tachycardic rate, regular rhythm, normal heart sounds. Exam reveals no gallop and no friction rubs. No murmur was heard. Pulmonary/Chest: Effort normal and breath sounds normal. No respiratory distress. She has no wheezes, rhonchi or rales. Abdominal: Soft, non-tender. Bowel sounds and aorta are normal. She exhibits no organomegaly, mass or bruit. Extremities: Trace. No cyanosis or clubbing. 2+ radial and carotid pulses. Distal extremity pulses: 2+ bilaterally. Neurological: She is alert and oriented to person, place, and time. No evidence of gross cranial nerve deficit. Coordination appeared normal.   Skin: Skin is warm and dry. There is no rash or diaphoresis. Psychiatric: She has a normal mood and affect.  Her speech is normal and behavior is normal.      MOST RECENT LABS ON RECORD:   Lab Results   Component Value Date    WBC 8.6 06/10/2022    HGB 15.9 (H) 06/10/2022    HCT 49.0 (H) 06/10/2022     06/10/2022    CHOL 226 (H) 05/20/2021    TRIG 147 05/20/2021    HDL 61 05/20/2021    ALT 25 03/21/2022 AST 23 03/21/2022     (L) 06/10/2022    K 4.5 06/10/2022     06/10/2022    CREATININE 0.74 06/10/2022    BUN 11 06/10/2022    CO2 24 06/10/2022    TSH 1.71 06/10/2022    LABA1C 5.9 05/20/2021       ASSESSMENT:      Diagnosis Orders   1. SOB (shortness of breath)  Lipid Panel    Home sleep study    Echo 2D w doppler w color complete    Hemoglobin A1C    carvedilol (COREG) 6.25 MG tablet    Brain Natriuretic Peptide    Stress test (Lexiscan)   2. Atypical chest pain  Lipid Panel    Home sleep study    Echo 2D w doppler w color complete    Hemoglobin A1C    carvedilol (COREG) 6.25 MG tablet    Brain Natriuretic Peptide    Stress test (Lexiscan)   3. Palpitation  Lipid Panel    Home sleep study    Echo 2D w doppler w color complete    Hemoglobin A1C    carvedilol (COREG) 6.25 MG tablet    Brain Natriuretic Peptide    Stress test (Lexiscan)   4. Dizziness  Lipid Panel    Home sleep study    Echo 2D w doppler w color complete    Hemoglobin A1C    carvedilol (COREG) 6.25 MG tablet    Brain Natriuretic Peptide    Stress test (Lexiscan)   5. Primary hypertension  Lipid Panel    Home sleep study    Echo 2D w doppler w color complete    Hemoglobin A1C    carvedilol (COREG) 6.25 MG tablet    Brain Natriuretic Peptide    Stress test (Lexiscan)   6. VANESSA (obstructive sleep apnea)     7. Snoring  Lipid Panel    Home sleep study    Echo 2D w doppler w color complete    Hemoglobin A1C    carvedilol (COREG) 6.25 MG tablet    Brain Natriuretic Peptide    Stress test (Lexiscan)   8. Morbid obesity ([address]     9. Tobacco abuse counseling  Lipid Panel    Home sleep study    Echo 2D w doppler w color complete    Hemoglobin A1C    carvedilol (COREG) 6.25 MG tablet    Brain Natriuretic Peptide    Stress test (Lexiscan)   10. Pre-diabetes  Lipid Panel    Home sleep study    Echo 2D w doppler w color complete    Hemoglobin A1C    carvedilol (COREG) 6.25 MG tablet    Brain Natriuretic Peptide    Stress test (Lexiscan)   11.  Other fatigue  Lipid Panel    Home sleep study    Echo 2D w doppler w color complete    Hemoglobin A1C    carvedilol (COREG) 6.25 MG tablet    Brain Natriuretic Peptide    Stress test (Lexiscan)       PLAN:        · Shortness of breath with mild exertion:   Additional Testing List: I ordered a pro BNP level to better assess for the patients level of heart failure. I told them that they could get their lab work performed at the location of their choosing, unfortunately, if the lab work was not performed at a [facility]) facility I could not guarantee my ability to follow up with them on their results. , I ordered a echocardiogram to better assess for the etiology of this problem and to help guide future management and Because of current signs and symptoms which can certainly be caused by significant coronary artery disease, I ordered a treadmill stress test with SPECT imaging to try and rule out this possibility. · Atypical chest pain but still suspicious for possible myocardial ischemia:  · Medical Management for Suspected coronary artery disease:   Antiplatelet Agent: Not indicated at this time.  Beta Blocker: START Carvedilol (Coreg) 6.25 mg twice daily. I also discussed the potential side effects of this medication including lightheadedness and dizziness and instructed them to stop the medication of this occurs and call our office if this occurs.  Anti-anginal medications: Not indicated at this time.  Cholesterol Reduction Therapy: Not indicated at this time.  Additional Testing List: Because of current signs and symptoms which can certainly be caused by significant coronary artery disease, I ordered a treadmill stress test with SPECT imaging to try and rule out this possibility.  I also ordered a lipid panel to be done.    Additional counseling: I advised them to call our office or go to the emergency room if they developed worsening or persistent chest pain or increased shortness of breath as this could be life threatening.   Recurrent intermittent palpitations:  · Beta Blocker: START Carvedilol (Coreg) 6.25 mg twice daily. I also discussed the potential side effects of this medication including lightheadedness and dizziness and instructed them to stop the medication of this occurs and call our office if this occurs. · Calcium Channel Blocker: STOP amlodipine (Norvasc)   · Additional Testing List: None     · Lightheadedness/dizziness:  · Pharmacologic Therapy: Not indicated at this time. · Nonpharmacologic counseling: Because of her condition, I reminded her to try and keep herself well-hydrated and to take extra time when moving from laying to sitting, sitting to standing and standing to walking. I also explained to her to help improve her symptoms she should include 3 g sodium diet, 1 or 2 L of sports drinks daily, knee-high compressions stockings. · Additional Testing List: None    · Essential Hypertension: Controlled   Beta Blocker: START Carvedilol (Coreg) 6.25 mg twice daily.  ACE Inibitor/ARB: Not indicated at this time.  Calcium Channel Blocker: STOP amlodipine (Norvasc)    Diuretics: Continue Spironolactone (Aldactone) 25 mg daily.  Additional Testing List: None     Suspected Obstructive Sleep Apnea: Given Ms. Zuluaga's symptoms of daytime tiredness and not waking up rested in the morning, I advised her to consider undergoing a sleep apnea screening which she agreed to do. Therefore I ordered a home sleep study. Morbid obesity: Body mass index is 51.64 kg/m². I also briefly discussed both diet and exercise strategies for her to continue to loses weight and she was very receptive to this. Tobacco Abuse Counseling: I spent several minutes discussing the dangers of tobacco abuse as well as multiple methods for trying to quit smoking. In the end, Ms. Falguni Hays said that she did not want any assistance at this time but would continue to try and quit reduce and eventually quit smoking in the near future. · Pre-diabetes:  · I ordered a hemoglobin A1c to be done for risk assessment. · Fatigue  · We discussed this can be multifactorial. I suspect she has sleep apnea and therefore ordered a home sleep study. · I also started her on coreg 6.25 mg daily as her Holter monitor had tachycardia 34% of the time. · Because of her abnormal ECG, I ordered a echocardiogram to better assess for the etiology and severity of this problem. · Additional Testing List: Additional Testing List: Because of current signs and symptoms which can certainly be caused by significant coronary artery disease, I ordered a treadmill stress test with SPECT imaging to try and rule out this possibility. In the meantime, I encouraged Ms. Valente Briones to continue to take her other medications. FOLLOW UP:   I told Ms. Valente Briones to call my office if she had any problems, but otherwise I asked her to Return in about 6 weeks (around 8/12/2022). However, I would be happy to see her sooner should the need arise. Sincerely,  Adarsh Mckeon PA-C  [facility] Specialist     [address]  Phone: [phone], Fax: [phone]     I believe that the risk of significant morbidity and mortality related to the patient's current medical conditions are: intermediate-high. Approximately 45 minutes were spent during prep work, discussion and exam of the patient, and follow up documentation and all of their questions were answered. The documentation recorded by the scribe, accurately and completely reflects the services I personally performed and the decisions made by me.  Adarsh Mckeon PA-C July 1, 2022

## 2022-07-03 LAB
ESTIMATED AVERAGE GLUCOSE: 134 MG/DL
HBA1C MFR BLD: 6.3 % (ref 4–6)

## 2022-07-05 ENCOUNTER — TELEPHONE (OUTPATIENT)
Dept: CARDIOLOGY | Age: 52
End: 2022-07-05

## 2022-07-05 NOTE — TELEPHONE ENCOUNTER
----- Message from Vanda Mackenzie PA-C sent at 7/5/2022  8:10 AM EDT -----  Please notify patient that their lab results are normal. Although her hemoglobin A1C was 6.3. Please send a copy of her results to REBEKAH Zabala CNP. Please continue current treatment and follow up.

## 2022-07-05 NOTE — RESULT ENCOUNTER NOTE
Please notify patient that their lab results are normal. Although her hemoglobin A1C was 6.3. Please send a copy of her results to REBEKAH Resendiz CNP. Please continue current treatment and follow up.

## 2022-07-12 ENCOUNTER — HOSPITAL ENCOUNTER (OUTPATIENT)
Dept: NON INVASIVE DIAGNOSTICS | Age: 52
Discharge: HOME | End: 2022-07-12
Payer: MEDICAID

## 2022-07-12 ENCOUNTER — HOSPITAL ENCOUNTER (OUTPATIENT)
Age: 52
Setting detail: SPECIMEN
Discharge: HOME | End: 2022-07-12
Payer: MEDICAID

## 2022-07-12 DIAGNOSIS — R00.2 PALPITATION: ICD-10-CM

## 2022-07-12 DIAGNOSIS — I10 PRIMARY HYPERTENSION: ICD-10-CM

## 2022-07-12 DIAGNOSIS — R42 DIZZINESS: ICD-10-CM

## 2022-07-12 DIAGNOSIS — R06.02 SOB (SHORTNESS OF BREATH): ICD-10-CM

## 2022-07-12 DIAGNOSIS — R73.03 PRE-DIABETES: ICD-10-CM

## 2022-07-12 DIAGNOSIS — R53.83 OTHER FATIGUE: ICD-10-CM

## 2022-07-12 DIAGNOSIS — Z71.6 TOBACCO ABUSE COUNSELING: ICD-10-CM

## 2022-07-12 DIAGNOSIS — R06.83 SNORING: ICD-10-CM

## 2022-07-12 DIAGNOSIS — R07.89 ATYPICAL CHEST PAIN: ICD-10-CM

## 2022-07-12 LAB
LV EF: 60 %
LVEF MODALITY: NORMAL

## 2022-07-12 PROCEDURE — 6360000002 HC RX W HCPCS: Performed by: FAMILY MEDICINE

## 2022-07-12 PROCEDURE — 93306 TTE W/DOPPLER COMPLETE: CPT

## 2022-07-12 PROCEDURE — 3430000000 HC RX DIAGNOSTIC RADIOPHARMACEUTICAL: Performed by: PHYSICIAN ASSISTANT

## 2022-07-12 PROCEDURE — A9500 TC99M SESTAMIBI: HCPCS | Performed by: PHYSICIAN ASSISTANT

## 2022-07-12 PROCEDURE — 87338 HPYLORI STOOL AG IA: CPT

## 2022-07-12 PROCEDURE — 93017 CV STRESS TEST TRACING ONLY: CPT

## 2022-07-12 RX ADMIN — Medication 30 MILLICURIE: at 10:01

## 2022-07-12 RX ADMIN — REGADENOSON 0.4 MG: 0.08 INJECTION, SOLUTION INTRAVENOUS at 10:14

## 2022-07-13 ENCOUNTER — TELEPHONE (OUTPATIENT)
Dept: CARDIOLOGY | Age: 52
End: 2022-07-13

## 2022-07-13 ENCOUNTER — HOSPITAL ENCOUNTER (OUTPATIENT)
Dept: NON INVASIVE DIAGNOSTICS | Age: 52
Discharge: HOME | End: 2022-07-13
Payer: MEDICAID

## 2022-07-13 LAB
DIRECT EXAM: POSITIVE
SPECIMEN DESCRIPTION: ABNORMAL

## 2022-07-13 PROCEDURE — 78452 HT MUSCLE IMAGE SPECT MULT: CPT

## 2022-07-13 PROCEDURE — A9500 TC99M SESTAMIBI: HCPCS | Performed by: PHYSICIAN ASSISTANT

## 2022-07-13 PROCEDURE — 3430000000 HC RX DIAGNOSTIC RADIOPHARMACEUTICAL: Performed by: PHYSICIAN ASSISTANT

## 2022-07-13 RX ADMIN — Medication 30 MILLICURIE: at 12:50

## 2022-07-14 ENCOUNTER — TELEPHONE (OUTPATIENT)
Dept: CARDIOLOGY | Age: 52
End: 2022-07-14

## 2022-07-14 ENCOUNTER — OFFICE VISIT (OUTPATIENT)
Dept: PRIMARY CARE CLINIC | Age: 52
End: 2022-07-14
Payer: MEDICAID

## 2022-07-14 VITALS
DIASTOLIC BLOOD PRESSURE: 84 MMHG | SYSTOLIC BLOOD PRESSURE: 134 MMHG | BODY MASS INDEX: 52.81 KG/M2 | TEMPERATURE: 97.1 F | OXYGEN SATURATION: 93 % | RESPIRATION RATE: 18 BRPM | WEIGHT: 270.4 LBS | HEART RATE: 92 BPM

## 2022-07-14 DIAGNOSIS — J44.1 CHRONIC OBSTRUCTIVE PULMONARY DISEASE WITH ACUTE EXACERBATION (HCC): ICD-10-CM

## 2022-07-14 DIAGNOSIS — E78.1 HIGH TRIGLYCERIDES: ICD-10-CM

## 2022-07-14 DIAGNOSIS — E11.9 TYPE 2 DIABETES MELLITUS WITHOUT COMPLICATION, WITHOUT LONG-TERM CURRENT USE OF INSULIN (HCC): Primary | ICD-10-CM

## 2022-07-14 DIAGNOSIS — R06.02 SHORTNESS OF BREATH: ICD-10-CM

## 2022-07-14 PROCEDURE — 3044F HG A1C LEVEL LT 7.0%: CPT | Performed by: NURSE PRACTITIONER

## 2022-07-14 PROCEDURE — 99214 OFFICE O/P EST MOD 30 MIN: CPT | Performed by: NURSE PRACTITIONER

## 2022-07-14 RX ORDER — AZITHROMYCIN 250 MG/1
250 TABLET, FILM COATED ORAL SEE ADMIN INSTRUCTIONS
Qty: 6 TABLET | Refills: 0 | Status: SHIPPED | OUTPATIENT
Start: 2022-07-14 | End: 2022-07-19

## 2022-07-14 RX ORDER — GUAIFENESIN 600 MG/1
600 TABLET, EXTENDED RELEASE ORAL 2 TIMES DAILY
Qty: 30 TABLET | Refills: 0 | Status: SHIPPED | OUTPATIENT
Start: 2022-07-14 | End: 2022-07-29

## 2022-07-14 RX ORDER — PREDNISONE 20 MG/1
20 TABLET ORAL 2 TIMES DAILY
Qty: 10 TABLET | Refills: 0 | Status: SHIPPED | OUTPATIENT
Start: 2022-07-14 | End: 2022-07-19

## 2022-07-14 RX ORDER — ALBUTEROL SULFATE 90 UG/1
2 AEROSOL, METERED RESPIRATORY (INHALATION) 4 TIMES DAILY PRN
Qty: 54 G | Refills: 1 | Status: SHIPPED | OUTPATIENT
Start: 2022-07-14

## 2022-07-14 RX ORDER — FENOFIBRATE 160 MG/1
160 TABLET ORAL DAILY
Qty: 90 TABLET | Refills: 1 | Status: SHIPPED | OUTPATIENT
Start: 2022-07-14

## 2022-07-14 ASSESSMENT — PATIENT HEALTH QUESTIONNAIRE - PHQ9
5. POOR APPETITE OR OVEREATING: 0
1. LITTLE INTEREST OR PLEASURE IN DOING THINGS: 0
4. FEELING TIRED OR HAVING LITTLE ENERGY: 0
2. FEELING DOWN, DEPRESSED OR HOPELESS: 0
7. TROUBLE CONCENTRATING ON THINGS, SUCH AS READING THE NEWSPAPER OR WATCHING TELEVISION: 0
3. TROUBLE FALLING OR STAYING ASLEEP: 0
10. IF YOU CHECKED OFF ANY PROBLEMS, HOW DIFFICULT HAVE THESE PROBLEMS MADE IT FOR YOU TO DO YOUR WORK, TAKE CARE OF THINGS AT HOME, OR GET ALONG WITH OTHER PEOPLE: 0
SUM OF ALL RESPONSES TO PHQ QUESTIONS 1-9: 0
SUM OF ALL RESPONSES TO PHQ9 QUESTIONS 1 & 2: 0
8. MOVING OR SPEAKING SO SLOWLY THAT OTHER PEOPLE COULD HAVE NOTICED. OR THE OPPOSITE, BEING SO FIGETY OR RESTLESS THAT YOU HAVE BEEN MOVING AROUND A LOT MORE THAN USUAL: 0
SUM OF ALL RESPONSES TO PHQ QUESTIONS 1-9: 0
9. THOUGHTS THAT YOU WOULD BE BETTER OFF DEAD, OR OF HURTING YOURSELF: 0
6. FEELING BAD ABOUT YOURSELF - OR THAT YOU ARE A FAILURE OR HAVE LET YOURSELF OR YOUR FAMILY DOWN: 0
SUM OF ALL RESPONSES TO PHQ QUESTIONS 1-9: 0
SUM OF ALL RESPONSES TO PHQ QUESTIONS 1-9: 0

## 2022-07-14 NOTE — TELEPHONE ENCOUNTER
----- Message from Luis Molina PA-C sent at 7/14/2022  9:52 AM EDT -----  Please let them know their stress test looked good, it was low risk. Will discuss at follow up. Thanks.

## 2022-07-14 NOTE — PATIENT INSTRUCTIONS
SURVEY:     You may be receiving a survey from [employer] regarding your visit today. Please complete the survey to enable us to provide the highest quality of care to you and your family. If you cannot score us a very good on any question, please call the office to discuss how we could have made your experience a very good one. Thank you.   Mu Rothman, APRN-ANDREW Pendleton, CNP  Dennys Rushing, LPN  Leanna Miller, CMA  Rudolph, CMA  Rosmery, CMA  Cookie, PCA  Nithya, PM

## 2022-07-14 NOTE — PROGRESS NOTES
REBEKAH Fishman CNP   metFORMIN (GLUCOPHAGE) 500 MG tablet Take 1 tablet by mouth 2 times daily (with meals) 7/14/22  Yes REBEKAH Fishman CNP   fenofibrate (TRIGLIDE) 160 MG tablet Take 1 tablet by mouth daily 7/14/22  Yes REBEKAH Fishman CNP   azithromycin (ZITHROMAX) 250 MG tablet Take 1 tablet by mouth See Admin Instructions for 5 days 500mg on day 1 followed by 250mg on days 2 - 5 7/14/22 7/19/22 Yes REBEKAH Fishman CNP   predniSONE (DELTASONE) 20 MG tablet Take 1 tablet by mouth 2 times daily for 5 days 7/14/22 7/19/22 Yes REBEKAH Fishman CNP   guaiFENesin (MUCINEX) 600 MG extended release tablet Take 1 tablet by mouth 2 times daily for 15 days 7/14/22 7/29/22 Yes REBEKAH Fishman CNP   carvedilol (COREG) 6.25 MG tablet Take 1 tablet by mouth 2 times daily 7/1/22  Yes Leyda Bermudez PA-C   spironolactone (ALDACTONE) 25 MG tablet Take 1 tablet by mouth daily 6/8/22  Yes REBEKAH Fishman CNP   ARIPiprazole (ABILIFY) 15 MG tablet take 1 tablet by mouth once daily 5/16/22  Yes REBEKAH Fishman CNP   baclofen (LIORESAL) 10 MG tablet Take 2 tablets by mouth 3 times daily 3/24/22  Yes REBEKAH Fishman CNP   meloxicam (MOBIC) 15 MG tablet Take 1 tablet by mouth daily 3/24/22  Yes REBEKAH Fishman CNP   gabapentin (NEURONTIN) 300 MG capsule Take 1 capsule by mouth 3 times daily for 30 days.  Intended supply: 90 days 2/17/22 7/14/22 Yes Siddhartha Junior MD   Elastic Bandages & Supports (MEDICAL COMPRESSION STOCKINGS) [address] 1 each by Does not apply route daily as needed (Wear Daily) 6/8/22 7/8/22  REBEKAH Fishman CNP   dicyclomine (BENTYL) 10 MG capsule Take 10 mg by mouth 4 times daily (before meals and nightly)  Patient not taking: Reported on 6/8/2022    Historical Provider, MD   omeprazole (PRILOSEC) 20 MG delayed release capsule Take 20 mg by mouth daily  Patient not taking: Reported on 7/14/2022    Historical Provider, MD   acetaminophen (TYLENOL) 500 MG tablet Take 500 mg by mouth every 6 hours as needed for Pain   Patient not taking: Reported on 6/8/2022    Historical Provider, MD        Allergies: Other    Social History:     Tobacco:    reports that she has been smoking cigarettes. She has a 28.00 pack-year smoking history. She has never used smokeless tobacco.  Alcohol:      reports current alcohol use. Drug Use:  reports that she does not currently use drugs. Family History:     Family History   Problem Relation Age of Onset    Other Mother     Diabetes Father     Heart Disease Father 27    Cancer Maternal Grandfather        Review of Systems:     Positive and Negative as described in HPI    Review of Systems   Constitutional: Negative. HENT:  Positive for congestion and rhinorrhea. Negative for sore throat. Eyes: Negative. Respiratory:  Positive for cough, shortness of breath and wheezing. Cardiovascular: Negative. Gastrointestinal: Negative. Endocrine: Negative. Genitourinary: Negative. Musculoskeletal: Negative. Skin: Negative. Allergic/Immunologic: Negative. Neurological: Negative. Hematological: Negative. Psychiatric/Behavioral: Negative. Physical Exam:   Vitals:  /84   Pulse 92   Temp 97.1 °F (36.2 °C) (Temporal)   Resp 18   Wt 270 lb 6.4 oz (122.7 kg)   LMP 07/27/2020   SpO2 93%   BMI 52.81 kg/m²     Physical Exam  Vitals and nursing note reviewed. Constitutional:       Appearance: Normal appearance. She is obese. HENT:      Head: Normocephalic. Right Ear: Tympanic membrane normal.      Left Ear: Tympanic membrane normal.      Nose: Rhinorrhea present. Mouth/Throat:      Lips: Pink. Mouth: Mucous membranes are moist.      Pharynx: Posterior oropharyngeal erythema present. Eyes:      Pupils: Pupils are equal, round, and reactive to light. Cardiovascular:      Rate and Rhythm: Normal rate and regular rhythm.       Heart sounds: Normal heart sounds. Pulmonary:      Effort: Pulmonary effort is normal.      Breath sounds: Normal air entry. Examination of the right-upper field reveals wheezing. Examination of the left-upper field reveals wheezing. Examination of the right-lower field reveals wheezing. Examination of the left-lower field reveals wheezing. Wheezing present. No decreased breath sounds. Musculoskeletal:         General: Normal range of motion. Cervical back: Normal range of motion. Skin:     General: Skin is warm. Capillary Refill: Capillary refill takes less than 2 seconds. Neurological:      General: No focal deficit present. Mental Status: She is alert and oriented to person, place, and time. Psychiatric:         Mood and Affect: Mood normal.         Behavior: Behavior normal.       Data:     Lab Results   Component Value Date/Time     06/10/2022 11:31 AM    K 4.5 06/10/2022 11:31 AM     06/10/2022 11:31 AM    CO2 24 06/10/2022 11:31 AM    BUN 11 06/10/2022 11:31 AM    CREATININE 0.74 06/10/2022 11:31 AM    GLUCOSE 89 06/10/2022 11:31 AM    PROT 7.3 03/21/2022 11:02 PM    LABALBU 4.1 03/21/2022 11:02 PM    BILITOT 0.16 03/21/2022 11:02 PM    ALKPHOS 109 03/21/2022 11:02 PM    AST 23 03/21/2022 11:02 PM    ALT 25 03/21/2022 11:02 PM     Lab Results   Component Value Date/Time    WBC 8.6 06/10/2022 11:31 AM    RBC 5.26 06/10/2022 11:31 AM    HGB 15.9 06/10/2022 11:31 AM    HCT 49.0 06/10/2022 11:31 AM    MCV 93.2 06/10/2022 11:31 AM    MCH 30.2 06/10/2022 11:31 AM    MCHC 32.4 06/10/2022 11:31 AM    RDW 14.7 06/10/2022 11:31 AM     06/10/2022 11:31 AM    MPV 9.5 06/10/2022 11:31 AM     Lab Results   Component Value Date/Time    TSH 1.71 06/10/2022 11:30 AM     Lab Results   Component Value Date/Time    CHOL 213 07/01/2022 12:10 PM    HDL 55 07/01/2022 12:10 PM    LABA1C 6.3 07/01/2022 12:09 PM       Assessment/Plan:      Diagnosis Orders   1.  Type 2 diabetes mellitus without complication, without long-term current use of insulin (HCC)  metFORMIN (GLUCOPHAGE) 500 MG tablet      2. High triglycerides  fenofibrate (TRIGLIDE) 160 MG tablet      3. Shortness of breath  Full PFT Study With Bronchodilator      4. Chronic obstructive pulmonary disease with acute exacerbation (HCC)  albuterol sulfate HFA (VENTOLIN HFA) 108 (90 Base) MCG/ACT inhaler    azithromycin (ZITHROMAX) 250 MG tablet    predniSONE (DELTASONE) 20 MG tablet    guaiFENesin (MUCINEX) 600 MG extended release tablet        The 10-year ASCVD risk score (Agnes Lane, et al., 2013) is: 5.4%    Values used to calculate the score:      Age: 54 years      Sex: Female      Is Non- : No      Diabetic: No      Tobacco smoker: Yes      Systolic Blood Pressure: 653 mmHg      Is BP treated: Yes      HDL Cholesterol: 55 mg/dL      Total Cholesterol: 213 mg/dL  Type 2 Diabetes-Start Metformin 500 mg BID. Discussed lifestyle modifications. High Triglycerides-Start fenofibrate 160 mg daily. Lifestyle modifications encouraged. COPD exacerbation-Azithromycin and Prednisone as prescribed. Mucinex as prescribed. Will order PFTs for chronic shortness of breath and call with results. 1.  Terry Hilario received counseling on the following healthy behaviors: nutrition, exercise, medication adherence, and tobacco cessation  2. Patient given educational materials - see patient instructions  3. Was a self-tracking handout given in paper form or via [employer]? No  If yes, see orders or list here. 4.  Discussed use, benefit, and side effects of prescribed medications. Barriers to medication compliance addressed. All patient questions answered. Pt voiced understanding. 5.  Reviewed prior labs and health maintenance  6. Continue current medications, diet and exercise.     Completed Refills   Requested Prescriptions     Signed Prescriptions Disp Refills    albuterol sulfate HFA (VENTOLIN HFA) 108 (90 Base) MCG/ACT inhaler 54 g 1 Sig: Inhale 2 puffs into the lungs 4 times daily as needed for Wheezing    metFORMIN (GLUCOPHAGE) 500 MG tablet 180 tablet 1     Sig: Take 1 tablet by mouth 2 times daily (with meals)    fenofibrate (TRIGLIDE) 160 MG tablet 90 tablet 1     Sig: Take 1 tablet by mouth daily    azithromycin (ZITHROMAX) 250 MG tablet 6 tablet 0     Sig: Take 1 tablet by mouth See Admin Instructions for 5 days 500mg on day 1 followed by 250mg on days 2 - 5    predniSONE (DELTASONE) 20 MG tablet 10 tablet 0     Sig: Take 1 tablet by mouth 2 times daily for 5 days    guaiFENesin (MUCINEX) 600 MG extended release tablet 30 tablet 0     Sig: Take 1 tablet by mouth 2 times daily for 15 days         Return in about 1 week (around 7/21/2022).

## 2022-07-14 NOTE — RESULT ENCOUNTER NOTE
Please let them know their stress test looked good, it was low risk. Will discuss at follow up. Thanks.

## 2022-07-14 NOTE — PROCEDURES
[address] [place], [place] [address]                              CARDIAC STRESS TEST    PATIENT NAME: Angelita Roque                     :        1970  MED REC NO:   [id number]                              ROOM:  ACCOUNT NO:   [de-identified]                           ADMIT DATE: 2022  PROVIDER:     Vincenzo Randolph MD    CARDIOVASCULAR DIAGNOSTIC DEPARTMENT    DATE OF STUDY:  2022    ORDERING PROVIDER:  Mary Anne Narayan PA-C    PRIMARY CARE PROVIDER:  Deborah Walker. Dedrick Plummer CNP    INTERPRETING PHYSICIAN:  Vincenzo Randolph MD    PHARMACOLOGIC MYOCARDIAL PERFUSION STRESS TESTING    Stress/Rest single isotope SPECT imaging with exercise stress and gated  SPECT imaging. INDICATIONS:  Assessment of recent chest pain. Assessment of a cardiac cause:  Dyspnea. CLINICAL HISTORY:  The patient is a 68-year-old woman with no known  coronary artery disease. Previous cardiac history includes:  None. Other previous history includes:  Chest pain, palpitations, fatigue,  dyspnea, lightheadedness, syncope, diabetes mellitus, indigestion,  heartburn, caffeine, smoker, hypertension, family history of coronary  artery disease <60 in father. Symptoms just prior to testing include:  Shortness of breath. Relevant medications:  Carvedilol (Coreg). PROCEDURE:  The heart rate was 90 at baseline and alice to 117 beats per  minute during the regadenoson infusion. The rest blood pressure was  152/80 mm/Hg and increased to 208/96 mm/Hg. The patient did complain of  shortness of breath following infusion. Pharmacologic stress testing was performed with regadenoson at a dose of  0.4 mg. Additionally, low level exercise using slow treadmill walking  was performed along with vasodilator infusion. MYOCARDIAL PERFUSION IMAGING:  Imaging was performed at rest 30-45  minutes following the injection of 30 mCi of sestamibi.   Approximately  10 seconds after Lexiscan injection, the patient was injected with 30  mCi of sestamibi. Gating post-stress tomographic imaging was performed  30-45 minutes after stress. STRESS ECG RESULTS:  The resting electrocardiogram demonstrated normal  sinus rhythm without significant ST-segment abnormalities that may  impair accurate ECG detection of stress induced cardiac ischemia. During vasodilator infusion and during recovery, the patient developed:    No significant ST segment changes suggestive of myocardial ischemia with  no premature atrial contractions (PACs) and no premature ventricular  contractions (PVCs). NUCLEAR IMAGING RESULTS:  The overall quality of the study is good. Mild to moderate attenuation artifact was seen. There is no evidence of  abnormal lung uptake. Additionally, the right ventricle appears normal.  The left ventricular cavity is noted to be normal in size on the stress  images. There is no evidence of transient ischemic dilatation (TID) of  the left ventricle. Gated SPECT imaging reveals normal myocardial thickening and wall motion  with a calculated left ventricular ejection fraction of 67%. The rest images demonstrate homogeneous tracer distribution throughout  the myocardium. On stress imaging, a small perfusion abnormality of mild intensity in  the anterior region which is most likely due to artifact. IMPRESSION:  1. Most likely normal myocardial perfusion imaging with soft tissue  artifact, but without significant evidence of myocardial ischemia or  infarction. 2.  Global left ventricular systolic function was normal, without  regional wall motion abnormalities. 3.  No significant electrocardiographic evidence of myocardial ischemia  during EKG monitoring without significant associated arrhythmias. Overall, these results are most consistent with a low risk for  significant coronary artery disease.     Although the patient's results were not completely normal, unless  clinical suspicion for significant ongoing coronary artery ischemia is  high, I would not suggest pursuing additional testing by coronary  angiography. The sensitivity for detecting ischemia on this test may have been  reduced due to the patient being on a beta blocker. Thea Saeed MD    D: 07/14/2022 9:16:07       T: 07/14/2022 9:17:23     MADHU/ADRIANA_ALEXANDRA  Job#: [id number]     Doc#: Unknown    CC:  DANICA Vogel APRN-CNP

## 2022-07-15 ENCOUNTER — TELEPHONE (OUTPATIENT)
Dept: CARDIOLOGY | Age: 52
End: 2022-07-15

## 2022-07-18 ASSESSMENT — ENCOUNTER SYMPTOMS
SORE THROAT: 0
COUGH: 1
RHINORRHEA: 1
GASTROINTESTINAL NEGATIVE: 1
WHEEZING: 1
SHORTNESS OF BREATH: 1
EYES NEGATIVE: 1
ALLERGIC/IMMUNOLOGIC NEGATIVE: 1

## 2022-07-19 DIAGNOSIS — M54.41 ACUTE RIGHT-SIDED LOW BACK PAIN WITH RIGHT-SIDED SCIATICA: ICD-10-CM

## 2022-07-19 RX ORDER — MELOXICAM 15 MG/1
TABLET ORAL
Qty: 90 TABLET | Refills: 0 | Status: SHIPPED | OUTPATIENT
Start: 2022-07-19 | End: 2022-09-27 | Stop reason: SDUPTHER

## 2022-07-19 NOTE — TELEPHONE ENCOUNTER
Health Maintenance   Topic Date Due    Cervical cancer screen  11/24/2022 (Originally 11/17/1993)    COVID-19 Vaccine (3 - Booster for Pfizer series) 03/24/2023 (Originally 3/4/2022)    DTaP/Tdap/Td vaccine (1 - Tdap) 06/08/2023 (Originally 11/17/1991)    Pneumococcal 0-64 years Vaccine (1 - PCV) 06/08/2023 (Originally 11/17/1978)    Hepatitis C screen  06/08/2023 (Originally 11/17/1990)    HIV screen  06/08/2023 (Originally 11/17/1987)    Flu vaccine (1) 09/01/2022    Colorectal Cancer Screen  02/15/2023    A1C test (Diabetic or Prediabetic)  07/01/2023    Depression Monitoring  07/14/2023    Lipids  07/01/2027    Hepatitis A vaccine  Aged Out    Hepatitis B vaccine  Aged Out    Hib vaccine  Aged Out    Meningococcal (ACWY) vaccine  Aged Out             (applicable per patient's age: Cancer Screenings, Depression Screening, Fall Risk Screening, Immunizations)    Hemoglobin A1C (%)   Date Value   07/01/2022 6.3 (H)   05/20/2021 5.9   05/14/2021 5.6     LDL Cholesterol (mg/dL)   Date Value   07/01/2022 110     AST (U/L)   Date Value   03/21/2022 23     ALT (U/L)   Date Value   03/21/2022 25     BUN (mg/dL)   Date Value   06/10/2022 11      (goal A1C is < 7)   (goal LDL is <100) need 30-50% reduction from baseline     BP Readings from Last 3 Encounters:   07/14/22 134/84   07/01/22 128/78   06/08/22 126/66    (goal /80)      All Future Testing planned in [facility]:  Lab Frequency Next Occurrence   Home sleep study Once 07/01/2022   Full PFT Study With Bronchodilator Once 07/14/2022       Next Visit Date:  Future Appointments   Date Time Provider Pao Elias   8/1/2022  7:15 PM [facility] RM 1 [facility] [place] HOD   8/2/2022  4:00 PM REBEKAH Cruz - CNP [facility]   8/11/2022  4:00 PM Jose Choudhary MD TIFF NEURO [facility]   8/12/2022 11:40 AM Dilia Gilliam PA-C TIFF CARD [facility]            Patient Active Problem List:     Major depressive disorder, recurrent episode with anxious distress ([facility].)

## 2022-08-01 ENCOUNTER — HOSPITAL ENCOUNTER (OUTPATIENT)
Dept: SLEEP CENTER | Age: 52
Discharge: HOME | End: 2022-08-01
Payer: MEDICAID

## 2022-08-01 VITALS — WEIGHT: 260 LBS | HEIGHT: 60 IN | BODY MASS INDEX: 51.04 KG/M2

## 2022-08-01 DIAGNOSIS — R42 DIZZINESS: ICD-10-CM

## 2022-08-01 DIAGNOSIS — Z71.6 TOBACCO ABUSE COUNSELING: ICD-10-CM

## 2022-08-01 DIAGNOSIS — R06.83 SNORING: ICD-10-CM

## 2022-08-01 DIAGNOSIS — R53.83 OTHER FATIGUE: ICD-10-CM

## 2022-08-01 DIAGNOSIS — R07.89 ATYPICAL CHEST PAIN: ICD-10-CM

## 2022-08-01 DIAGNOSIS — R06.02 SOB (SHORTNESS OF BREATH): ICD-10-CM

## 2022-08-01 DIAGNOSIS — I10 PRIMARY HYPERTENSION: ICD-10-CM

## 2022-08-01 DIAGNOSIS — R73.03 PRE-DIABETES: ICD-10-CM

## 2022-08-01 DIAGNOSIS — R00.2 PALPITATION: ICD-10-CM

## 2022-08-01 PROCEDURE — 95806 SLEEP STUDY UNATT&RESP EFFT: CPT

## 2022-08-01 ASSESSMENT — SLEEP AND FATIGUE QUESTIONNAIRES
HOW LIKELY ARE YOU TO NOD OFF OR FALL ASLEEP WHILE WATCHING TV: 2
HAVE YOU EVER NODDED OFF OR FALLEN ASLEEP WHILE DRIVING: NO
HOW LIKELY ARE YOU TO NOD OFF OR FALL ASLEEP WHILE SITTING INACTIVE IN A PUBLIC PLACE: 3
DO YOU HAVE HIGH BLOOD PRESSURE: YES
HOW LIKELY ARE YOU TO NOD OFF OR FALL ASLEEP IN A CAR, WHILE STOPPED FOR A FEW MINUTES IN TRAFFIC: 0
DO YOU SNORE: YES
HOW LIKELY ARE YOU TO NOD OFF OR FALL ASLEEP WHEN YOU ARE A PASSENGER IN A CAR FOR AN HOUR WITHOUT A BREAK: 3
SNORING VOLUME: AS LOUD AS TALKING
HAS ANYONE NOTICED THAT YOU QUIT BREATHING DURING SLEEP: ALMOST DAILY
HOW LIKELY ARE YOU TO NOD OFF OR FALL ASLEEP WHILE SITTING QUIETLY AFTER LUNCH WITHOUT ALCOHOL: 3
WHAT TIME DO YOU USUALLY WAKE UP: 14400
DOES YOUR SNORING BOTHER OTHERS: YES
WHAT TIME DO YOU USUALLY GO TO BED: 82800
HOW LIKELY ARE YOU TO NOD OFF OR FALL ASLEEP WHILE SITTING AND READING: 3
USUAL AMOUNT OF TIME TO FALL ASLEEP (MIN): 15
NORMAL AMOUNT OF SLEEP PER NIGHT: 4
ESS TOTAL SCORE: 17
HOW MANY NAPS DO YOU TAKE PER WEEK: 9
ARE YOU TIRED DURING WAKE TIME: ALMOST DAILY
HOW LIKELY ARE YOU TO NOD OFF OR FALL ASLEEP WHILE SITTING AND TALKING TO SOMEONE: 0
I SLEEP WELL: NO
HOW LIKELY ARE YOU TO NOD OFF OR FALL ASLEEP WHILE LYING DOWN TO REST IN THE AFTERNOON WHEN CIRCUMSTANCES PERMIT: 3
FOR THE FIRST 30 MINUTES AFTER WAKING, I AM: SOMEWHAT DROWSY
FUNCTION BEST IN: EVENING
HOW OFTEN DO YOU SNORE: ALMOST DAILY
NUMBER OF TIMES YOU WAKE PER NIGHT: 2
ARE YOU TIRED AFTER SLEEPING: ALMOST DAILY

## 2022-08-01 NOTE — PROGRESS NOTES
Patient arrived for HST issue and instruction 8/1/22 at 7:00pm.  Patient will return unit in morning to ER registration. Patient was concerned as she only sleeps 2-3 hours and then is up for 5-6 then goes back to bed. Tech explained we needed 8 hours of recording - she will try.

## 2022-08-02 ENCOUNTER — OFFICE VISIT (OUTPATIENT)
Dept: PRIMARY CARE CLINIC | Age: 52
End: 2022-08-02
Payer: MEDICAID

## 2022-08-02 VITALS
TEMPERATURE: 98.7 F | HEART RATE: 93 BPM | BODY MASS INDEX: 52.73 KG/M2 | OXYGEN SATURATION: 92 % | WEIGHT: 268.6 LBS | DIASTOLIC BLOOD PRESSURE: 68 MMHG | HEIGHT: 60 IN | SYSTOLIC BLOOD PRESSURE: 120 MMHG | RESPIRATION RATE: 18 BRPM

## 2022-08-02 DIAGNOSIS — R06.02 SHORTNESS OF BREATH: ICD-10-CM

## 2022-08-02 DIAGNOSIS — J44.9 CHRONIC OBSTRUCTIVE PULMONARY DISEASE, UNSPECIFIED COPD TYPE (HCC): Primary | ICD-10-CM

## 2022-08-02 PROCEDURE — 99214 OFFICE O/P EST MOD 30 MIN: CPT | Performed by: NURSE PRACTITIONER

## 2022-08-02 PROCEDURE — 94640 AIRWAY INHALATION TREATMENT: CPT | Performed by: NURSE PRACTITIONER

## 2022-08-02 RX ORDER — ALBUTEROL SULFATE 2.5 MG/3ML
2.5 SOLUTION RESPIRATORY (INHALATION) ONCE
Status: COMPLETED | OUTPATIENT
Start: 2022-08-02 | End: 2022-08-02

## 2022-08-02 RX ORDER — BUDESONIDE 0.25 MG/2ML
250 INHALANT ORAL 2 TIMES DAILY
Qty: 60 EACH | Refills: 3 | Status: SHIPPED | OUTPATIENT
Start: 2022-08-02 | End: 2022-10-05 | Stop reason: ALTCHOICE

## 2022-08-02 RX ORDER — ALBUTEROL SULFATE 2.5 MG/3ML
2.5 SOLUTION RESPIRATORY (INHALATION) EVERY 4 HOURS PRN
Qty: 120 EACH | Refills: 3 | Status: SHIPPED | OUTPATIENT
Start: 2022-08-02 | End: 2022-11-02

## 2022-08-02 RX ORDER — UMECLIDINIUM BROMIDE AND VILANTEROL TRIFENATATE 62.5; 25 UG/1; UG/1
1 POWDER RESPIRATORY (INHALATION) DAILY
Qty: 30 EACH | Refills: 0 | Status: CANCELLED | OUTPATIENT
Start: 2022-08-02 | End: 2022-09-01

## 2022-08-02 RX ADMIN — ALBUTEROL SULFATE 2.5 MG: 2.5 SOLUTION RESPIRATORY (INHALATION) at 17:06

## 2022-08-02 ASSESSMENT — PATIENT HEALTH QUESTIONNAIRE - PHQ9
3. TROUBLE FALLING OR STAYING ASLEEP: 0
7. TROUBLE CONCENTRATING ON THINGS, SUCH AS READING THE NEWSPAPER OR WATCHING TELEVISION: 0
10. IF YOU CHECKED OFF ANY PROBLEMS, HOW DIFFICULT HAVE THESE PROBLEMS MADE IT FOR YOU TO DO YOUR WORK, TAKE CARE OF THINGS AT HOME, OR GET ALONG WITH OTHER PEOPLE: 0
2. FEELING DOWN, DEPRESSED OR HOPELESS: 0
SUM OF ALL RESPONSES TO PHQ QUESTIONS 1-9: 0
SUM OF ALL RESPONSES TO PHQ QUESTIONS 1-9: 0
8. MOVING OR SPEAKING SO SLOWLY THAT OTHER PEOPLE COULD HAVE NOTICED. OR THE OPPOSITE, BEING SO FIGETY OR RESTLESS THAT YOU HAVE BEEN MOVING AROUND A LOT MORE THAN USUAL: 0
SUM OF ALL RESPONSES TO PHQ QUESTIONS 1-9: 0
SUM OF ALL RESPONSES TO PHQ QUESTIONS 1-9: 0
4. FEELING TIRED OR HAVING LITTLE ENERGY: 0
6. FEELING BAD ABOUT YOURSELF - OR THAT YOU ARE A FAILURE OR HAVE LET YOURSELF OR YOUR FAMILY DOWN: 0
5. POOR APPETITE OR OVEREATING: 0
9. THOUGHTS THAT YOU WOULD BE BETTER OFF DEAD, OR OF HURTING YOURSELF: 0

## 2022-08-02 ASSESSMENT — ENCOUNTER SYMPTOMS
SHORTNESS OF BREATH: 1
ALLERGIC/IMMUNOLOGIC NEGATIVE: 1
WHEEZING: 1
EYES NEGATIVE: 1
COUGH: 1
GASTROINTESTINAL NEGATIVE: 1

## 2022-08-02 NOTE — PROGRESS NOTES
[facility]  [place], [address] [facility]  [address]  Dept: [phone]  Dept Fax: [phone]      Name: Candi Melton  : 1970         Chief Complaint:     Chief Complaint   Patient presents with    Diabetes     1 week f/u for starting metformin     Breathing Problem     Patient states the steroid helped her breath        History of Present Illness:      Candi Melton is a 54 y.o.  female who presents with Diabetes (1 week f/u for starting metformin ) and Breathing Problem (Patient states the steroid helped her breath )    Jodi Silva is here for a 1 week follow up. She completed a round of antibiotics and steroids that she started on 2022. She states that the shortness of breath is worse with bending over and when laying down. She did have a sleep study done last night and we are waiting for the results. She has a pulmonology function test scheduled for 2022. She states she did feel better while taking steroids and then after 2 days started with shortness of breath again. She states she has not used her Albuterol today. She finished treatment for H. Pylori. Had a negative Covid test last week. Past Medical History:     Past Medical History:   Diagnosis Date    Anxiety     Depression     Family history of thyroid problem     Hypertension     Obesity     Osteoarthritis     Type 2 diabetes mellitus without complication ([facility]       Reviewed all health maintenance requirements and ordered appropriate tests  There are no preventive care reminders to display for this patient. Past Surgical History:     Past Surgical History:   Procedure Laterality Date    CHOLECYSTECTOMY          Medications:       Prior to Admission medications    Medication Sig Start Date End Date Taking? Authorizing Provider   budesonide (PULMICORT) 0.25 MG/2ML nebulizer suspension Take 2 mLs by nebulization in the morning and 2 mLs before bedtime.  22  Yes REBEKAH Fishman CNP   albuterol (PROVENTIL) (2.5 MG/3ML) 0.083% nebulizer solution Take 3 mLs by nebulization every 4 hours as needed for Wheezing or Shortness of Breath 8/2/22  Yes REBEKAH Fishman CNP   Nebulizers (AIRIAL COMPACT MINI NEBULIZER) MISC 0.083 Units by Does not apply route 2 times daily 8/2/22  Yes REBEKAH Fishman CNP   meloxicam (MOBIC) 15 MG tablet take 1 tablet by mouth once daily 7/19/22  Yes REBEKAH Fishman CNP   albuterol sulfate HFA (VENTOLIN HFA) 108 (90 Base) MCG/ACT inhaler Inhale 2 puffs into the lungs 4 times daily as needed for Wheezing 7/14/22  Yes REBEKAH Fishman CNP   metFORMIN (GLUCOPHAGE) 500 MG tablet Take 1 tablet by mouth 2 times daily (with meals) 7/14/22  Yes REBEKAH Fishman CNP   carvedilol (COREG) 6.25 MG tablet Take 1 tablet by mouth 2 times daily 7/1/22  Yes Leyda Bermudez PA-C   spironolactone (ALDACTONE) 25 MG tablet Take 1 tablet by mouth daily 6/8/22  Yes REBEKAH Fishman CNP   ARIPiprazole (ABILIFY) 15 MG tablet take 1 tablet by mouth once daily 5/16/22  Yes REBEKAH Fishman CNP   baclofen (LIORESAL) 10 MG tablet Take 2 tablets by mouth 3 times daily 3/24/22  Yes REBEKAH Fishman CNP   gabapentin (NEURONTIN) 300 MG capsule Take 1 capsule by mouth 3 times daily for 30 days.  Intended supply: 90 days 2/17/22 8/2/22 Yes Siddhartha Junior MD   fenofibrate (TRIGLIDE) 160 MG tablet Take 1 tablet by mouth daily 7/14/22   REBEKAH Fishman CNP   Elastic Bandages & Supports ([address]) [address] [facility] [address] 1 each by Does not apply route daily as needed (Wear Daily)  Patient not taking: Reported on 8/2/2022 6/8/22 7/8/22  Franchot Hendrix, APRN - CNP   dicyclomine (BENTYL) 10 MG capsule Take 10 mg by mouth 4 times daily (before meals and nightly)  Patient not taking: No sig reported    Historical Provider, MD   omeprazole (PRILOSEC) 20 MG delayed release capsule Take 20 mg by mouth daily  Patient not taking: No sig reported    Historical Provider, MD   acetaminophen (TYLENOL) 500 MG tablet Take 500 mg by mouth every 6 hours as needed for Pain   Patient not taking: No sig reported    Historical Provider, MD        Allergies: Other    Social History:     Tobacco:    reports that she has been smoking cigarettes. She has a 28.00 pack-year smoking history. She has never used smokeless tobacco.  Alcohol:      reports current alcohol use. Drug Use:  reports that she does not currently use drugs. Family History:     Family History   Problem Relation Age of Onset    Other Mother     Diabetes Father     Heart Disease Father 27    Cancer Maternal Grandfather        Review of Systems:     Positive and Negative as described in HPI    Review of Systems   Constitutional:  Positive for fatigue. Negative for fever. HENT: Negative. Eyes: Negative. Respiratory:  Positive for cough, shortness of breath and wheezing. Cardiovascular: Negative. Gastrointestinal: Negative. Endocrine: Negative. Genitourinary: Negative. Musculoskeletal: Negative. Skin: Negative. Allergic/Immunologic: Negative. Neurological: Negative. Hematological: Negative. Psychiatric/Behavioral: Negative. Physical Exam:   Vitals:  /68 (Site: Left Upper Arm, Position: Sitting)   Pulse 93   Temp 98.7 °F (37.1 °C) (Temporal)   Resp 18   Ht 5' (1.524 m)   Wt 268 lb 9.6 oz (121.8 kg)   LMP 07/27/2020   SpO2 92%   BMI 52.46 kg/m²   SaO2 96% after Albuterol treatment and deep breathing. Physical Exam  Vitals and nursing note reviewed. Constitutional:       Appearance: Normal appearance. HENT:      Head: Normocephalic. Right Ear: External ear normal.      Left Ear: External ear normal.      Nose: Nose normal.      Mouth/Throat:      Mouth: Mucous membranes are moist.      Pharynx: Oropharynx is clear. Eyes:      Pupils: Pupils are equal, round, and reactive to light. Cardiovascular:      Rate and Rhythm: Normal rate and regular rhythm. Heart sounds: Normal heart sounds. Pulmonary:      Effort: Prolonged expiration present. No retractions. Breath sounds: Decreased air movement (in bases) present. Examination of the left-upper field reveals wheezing. Examination of the right-lower field reveals decreased breath sounds. Examination of the left-lower field reveals decreased breath sounds. Decreased breath sounds and wheezing (on expiration) present. Comments: Post Albuterol nebulizer treatment patient Sao2 improved to 96% with deep breathing. Increased air movement. Increased wheezing with expiration. Improvement in air exchange in bases. Musculoskeletal:         General: Normal range of motion. Cervical back: Normal range of motion. Skin:     General: Skin is warm. Capillary Refill: Capillary refill takes less than 2 seconds. Neurological:      General: No focal deficit present. Mental Status: She is alert and oriented to person, place, and time.    Psychiatric:         Mood and Affect: Mood normal.         Behavior: Behavior normal.       Data:     Lab Results   Component Value Date/Time     06/10/2022 11:31 AM    K 4.5 06/10/2022 11:31 AM     06/10/2022 11:31 AM    CO2 24 06/10/2022 11:31 AM    BUN 11 06/10/2022 11:31 AM    CREATININE 0.74 06/10/2022 11:31 AM    GLUCOSE 89 06/10/2022 11:31 AM    PROT 7.3 03/21/2022 11:02 PM    LABALBU 4.1 03/21/2022 11:02 PM    BILITOT 0.16 03/21/2022 11:02 PM    ALKPHOS 109 03/21/2022 11:02 PM    AST 23 03/21/2022 11:02 PM    ALT 25 03/21/2022 11:02 PM     Lab Results   Component Value Date/Time    WBC 8.6 06/10/2022 11:31 AM    RBC 5.26 06/10/2022 11:31 AM    HGB 15.9 06/10/2022 11:31 AM    HCT 49.0 06/10/2022 11:31 AM    MCV 93.2 06/10/2022 11:31 AM    MCH 30.2 06/10/2022 11:31 AM    MCHC 32.4 06/10/2022 11:31 AM    RDW 14.7 06/10/2022 11:31 AM     06/10/2022 11:31 AM    MPV 9.5 06/10/2022 11:31 AM     Lab Results   Component Value Date/Time    TSH 1.71 06/10/2022 11:30 AM     Lab Results   Component Value Date/Time    CHOL 213 07/01/2022 12:10 PM    HDL 55 07/01/2022 12:10 PM    LABA1C 6.3 07/01/2022 12:09 PM       Assessment/Plan:      Diagnosis Orders   1. Chronic obstructive pulmonary disease, unspecified COPD type (HCC)  budesonide (PULMICORT) 0.25 MG/2ML nebulizer suspension    DME Order for Nebulizer as OP    albuterol (PROVENTIL) (2.5 MG/3ML) 0.083% nebulizer solution    713[address] - [place] Pressurized/nonpressurized inhalation treatment      2. Shortness of breath          COPD/Shortness of Breath-Start Pulmicort nebulizer BID. Albuterol Nebulizer as needed. Discussed deep breathing and smoking cessation at length. Pt. Does not feel that her smoking is causing her shortness of breath. Encouraged to get her PFT test completed and has it scheduled for 8/30/22. Would like to follow up after completed. Pt. To return to office or go to ER if any increased shortness of breath or difficulty breathing. 1.  Veena Tavares received counseling on the following healthy behaviors: nutrition, exercise, medication adherence, and tobacco cessation  2. Patient given educational materials - see patient instructions  3. Was a self-tracking handout given in paper form or via [employer]? No  If yes, see orders or list here. 4.  Discussed use, benefit, and side effects of prescribed medications. Barriers to medication compliance addressed. All patient questions answered. Pt voiced understanding. 5.  Reviewed prior labs and health maintenance  6. Continue current medications, diet and exercise. Completed Refills   Requested Prescriptions     Signed Prescriptions Disp Refills    budesonide (PULMICORT) 0.25 MG/2ML nebulizer suspension 60 each 3     Sig: Take 2 mLs by nebulization in the morning and 2 mLs before bedtime.     albuterol (PROVENTIL) (2.5 MG/3ML) 0.083% nebulizer solution 120 each 3     Sig: Take 3 mLs by nebulization every 4 hours as needed for Wheezing or Shortness of Breath    Nebulizers (AIRIAL COMPACT MINI NEBULIZER) MISC 1 each 0     Si.083 Units by Does not apply route 2 times daily         Return in about 5 weeks (around 2022).

## 2022-08-02 NOTE — PATIENT INSTRUCTIONS
SURVEY:     You may be receiving a survey from [employer] regarding your visit today. Please complete the survey to enable us to provide the highest quality of care to you and your family. If you cannot score us a very good on any question, please call the office to discuss how we could have made your experience a very good one. Thank you.   Tatianna Rothman, APRN-CNP  Pablito Edwards, CNP  Simi Garcia, N  [facility], CMA  Rudolph, CMA  Rosmery, CMA  Cookie, PCA  Nithya, PM

## 2022-08-03 ENCOUNTER — TELEPHONE (OUTPATIENT)
Dept: CARDIOLOGY | Age: 52
End: 2022-08-03

## 2022-08-03 LAB — STATUS: NORMAL

## 2022-08-03 NOTE — RESULT ENCOUNTER NOTE
Sleep screening was insignificant for VANESSA, please continue with weight reduction to try to reduce symptoms. Will discuss at follow up.

## 2022-08-03 NOTE — TELEPHONE ENCOUNTER
----- Message from Laura Parsons PA-C sent at 8/3/2022 10:47 AM EDT -----  Sleep screening was insignificant for VANESSA, please continue with weight reduction to try to reduce symptoms. Will discuss at follow up.

## 2022-08-11 ENCOUNTER — TELEPHONE (OUTPATIENT)
Dept: NEUROLOGY | Age: 52
End: 2022-08-11

## 2022-08-11 ENCOUNTER — OFFICE VISIT (OUTPATIENT)
Dept: NEUROLOGY | Age: 52
End: 2022-08-11
Payer: MEDICAID

## 2022-08-11 VITALS
HEIGHT: 60 IN | SYSTOLIC BLOOD PRESSURE: 129 MMHG | DIASTOLIC BLOOD PRESSURE: 70 MMHG | BODY MASS INDEX: 51.58 KG/M2 | WEIGHT: 262.7 LBS | RESPIRATION RATE: 18 BRPM | TEMPERATURE: 97.6 F | HEART RATE: 84 BPM

## 2022-08-11 DIAGNOSIS — M54.41 ACUTE RIGHT-SIDED LOW BACK PAIN WITH RIGHT-SIDED SCIATICA: ICD-10-CM

## 2022-08-11 PROCEDURE — 99212 OFFICE O/P EST SF 10 MIN: CPT | Performed by: NEUROMUSCULOSKELETAL MEDICINE, SPORTS MEDICINE

## 2022-08-11 PROCEDURE — 99214 OFFICE O/P EST MOD 30 MIN: CPT

## 2022-08-11 RX ORDER — GABAPENTIN 300 MG/1
300 CAPSULE ORAL 3 TIMES DAILY
Qty: 90 CAPSULE | Refills: 5 | Status: SHIPPED | OUTPATIENT
Start: 2022-08-11 | End: 2022-10-05

## 2022-08-11 RX ORDER — BACLOFEN 10 MG/1
20 TABLET ORAL 3 TIMES DAILY
Qty: 180 TABLET | Refills: 5 | Status: SHIPPED | OUTPATIENT
Start: 2022-08-11 | End: 2022-09-10

## 2022-08-11 NOTE — TELEPHONE ENCOUNTER
Patient did not wish to schedule her 6 month follow up visit at [facility]. Stated she will call us to schedule when she knows her schedule.

## 2022-08-11 NOTE — PROGRESS NOTES
NEUROLOGY FOLLOW UP    Patient Name:  Parker Palomaers  :   1970  Clinic Visit Date: 2022    I saw Ms. Parker Palomares  in the neurology clinic today for follow-up chronic lower back pain. Symptoms have remained improved with baclofen and gabapentin. She continues have intermittent lower back pain depending on what she does. No radicular symptoms. No weakness or bladder problems. \"I am feeling okay \". REVIEW OF SYSTEMS    Constitutional Weight changes: present, change in appetite: present Fatigue: present; Fevers : absent, Any recent hospitalizations:  absent   HEENT Ears: normal,  Visual disturbance: absent   Respiratory Shortness of breath: present, choking:  absent, Cough: present, Snoring : present   Cardiovascular Chest pain: absent, Leg swelling :present, palpitations : absent, fainting : absent   GI Constipation: absent, Diarrhea: present, Swallowing change: absent    Urinary frequency: present, Urinary urgency: present, Urinary incontinence: absent   Musculoskeletal Neck pain: present, Back pain: present, Stiffness: absent, Muscle pain: present, Joint pain: present, restless leg : present   Dermatological Hair loss: absent, Skin changes: absent   Neurological Confusion: present, Trouble concentrating: absent, Seizures: absent;  Memory loss: present, balance problem: absent, Dizziness: present, vertigo: absent, Weakness: absent, Numbness present, Tremor: absent, Spasm: present, involuntary movement: absent, Speech difficulty: absent, Headache: present, Light sensitivity: absent   Psychiatric Anxiety: present, Depression  present, drug abuse: absent, Hallucination: absent, mood disorder: absent, Suicidal ideations absent   Hematologic Abnormal bleeding: absent, Anemia: absent, Lymph gland changes: absent Clotting disorder: absent     Past Medical History:   Diagnosis Date    Anxiety     Depression     Family history of thyroid problem     Hypertension     Obesity     Osteoarthritis 60 each 3    albuterol (PROVENTIL) (2.5 MG/3ML) 0.083% nebulizer solution Take 3 mLs by nebulization every 4 hours as needed for Wheezing or Shortness of Breath 120 each 3    meloxicam (MOBIC) 15 MG tablet take 1 tablet by mouth once daily 90 tablet 0    albuterol sulfate HFA (VENTOLIN HFA) 108 (90 Base) MCG/ACT inhaler Inhale 2 puffs into the lungs 4 times daily as needed for Wheezing 54 g 1    metFORMIN (GLUCOPHAGE) 500 MG tablet Take 1 tablet by mouth 2 times daily (with meals) 180 tablet 1    carvedilol (COREG) 6.25 MG tablet Take 1 tablet by mouth 2 times daily 180 tablet 3    spironolactone (ALDACTONE) 25 MG tablet Take 1 tablet by mouth daily 30 tablet 3    ARIPiprazole (ABILIFY) 15 MG tablet take 1 tablet by mouth once daily 90 tablet 1    acetaminophen (TYLENOL) 500 MG tablet Take 500 mg by mouth every 6 hours as needed for Pain      Nebulizers (AIRIAL COMPACT MINI NEBULIZER) MISC 0.083 Units by Does not apply route 2 times daily 1 each 0    fenofibrate (TRIGLIDE) 160 MG tablet Take 1 tablet by mouth daily 90 tablet 1    Elastic Bandages & Supports (MEDICAL COMPRESSION STOCKINGS) MISC 1 each by Does not apply route daily as needed (Wear Daily) (Patient not taking: Reported on 8/2/2022) 1 each 0     No current facility-administered medications for this visit.              DATA:  Lab Results   Component Value Date    WBC 8.6 06/10/2022    HGB 15.9 (H) 06/10/2022     06/10/2022    CHOL 213 (H) 07/01/2022    TRIG 242 (H) 07/01/2022    HDL 55 07/01/2022    ALT 25 03/21/2022    AST 23 03/21/2022     (L) 06/10/2022    K 4.5 06/10/2022     06/10/2022    CREATININE 0.74 06/10/2022    BUN 11 06/10/2022    CO2 24 06/10/2022    TSH 1.71 06/10/2022    LABA1C 6.3 (H) 07/01/2022       /70 (Site: Right Upper Arm, Position: Sitting, Cuff Size: Large Adult)   Pulse 84   Temp 97.6 °F (36.4 °C) (Temporal)   Resp 18   Ht 5' (1.524 m)   Wt 262 lb 11.2 oz (119.2 kg)   LMP 07/27/2020   BMI 51.31 kg/m²     NEUROLOGICAL EXAMINATION:     MENTAL STATUS:.  Normal.    CRANIAL NERVES: II-XII are normal    MOTOR EXAMINATION: Muscle tone is normal in all the limbs. Strength is 5/5 in both upper and lower limbs. No abnormal limb movements. SENSORY EXAMINATION: Normal.     STRETCH REFLEXES: Symmetrical in both the upper and lower limbs. GAIT:.  No ataxia      IMPRESSION:    1. Chronic lower back pains secondary to lumbar degenerative disc disease. Doing very well on combination of gabapentin 300 mg 3 times daily+ baclofen 20 mg 3 times daily    PLAN:    1. Continue baclofen and gabapentin at current doses  2. Follow-up in 6 months    NOTE: This neurology evaluation is part of outpatient coverage at [facility]  1-2 days per week. Patients requiring frequent evaluations or uncomfortable with potential 3-4 day turnaround on questions or calls  may be better served by a neurologist in the area full time. [facility] at [facility]. Kalpana is available for outpatient visits and procedures including EMG/NCS. Non-[facility] system neurologists also practice in [facility] ([address]) and [facility] (Ev Benavides).        Bhupinder Godoy MD   8/11/2022  5:34 PM

## 2022-08-11 NOTE — PATIENT INSTRUCTIONS
SURVEY:    You may be receiving a survey from [employer] regarding your visit today. Please complete the survey to enable us to provide the highest quality of care to you and your family. If you cannot score us a very good on any question, please call the office to discuss how we could have made your experience a very good one. Thank you.

## 2022-09-27 DIAGNOSIS — M54.41 ACUTE RIGHT-SIDED LOW BACK PAIN WITH RIGHT-SIDED SCIATICA: ICD-10-CM

## 2022-09-27 RX ORDER — MELOXICAM 15 MG/1
TABLET ORAL
Qty: 90 TABLET | Refills: 1 | Status: SHIPPED | OUTPATIENT
Start: 2022-09-27

## 2022-09-27 NOTE — TELEPHONE ENCOUNTER
Health Maintenance   Topic Date Due    Flu vaccine (1) Never done    Cervical cancer screen  11/24/2022 (Originally 11/17/1993)    COVID-19 Vaccine (3 - Booster for Pfizer series) 03/24/2023 (Originally 3/4/2022)    DTaP/Tdap/Td vaccine (1 - Tdap) 06/08/2023 (Originally 11/17/1991)    Pneumococcal 0-64 years Vaccine (1 - PCV) 06/08/2023 (Originally 11/17/1978)    Hepatitis C screen  06/08/2023 (Originally 11/17/1990)    HIV screen  06/08/2023 (Originally 11/17/1987)    Colorectal Cancer Screen  02/15/2023    A1C test (Diabetic or Prediabetic)  07/01/2023    Depression Monitoring  08/02/2023    Lipids  07/01/2027    Hepatitis A vaccine  Aged Out    Hepatitis B vaccine  Aged Out    Hib vaccine  Aged Out    Meningococcal (ACWY) vaccine  Aged Out             (applicable per patient's age: Cancer Screenings, Depression Screening, Fall Risk Screening, Immunizations)    Hemoglobin A1C (%)   Date Value   07/01/2022 6.3 (H)   05/20/2021 5.9   05/14/2021 5.6     LDL Cholesterol (mg/dL)   Date Value   07/01/2022 110     AST (U/L)   Date Value   03/21/2022 23     ALT (U/L)   Date Value   03/21/2022 25     BUN (mg/dL)   Date Value   06/10/2022 11      (goal A1C is < 7)   (goal LDL is <100) need 30-50% reduction from baseline     BP Readings from Last 3 Encounters:   08/11/22 129/70   08/02/22 120/68   07/14/22 134/84    (goal /80)      All Future Testing planned in [facility]:  Lab Frequency Next Occurrence   Full PFT Study With Bronchodilator Once 07/14/2022       Next Visit Date:  Future Appointments   Date Time Provider Pao Elias   9/28/2022  4:00 PM [facility] PULMONARY FUNCTION ROOM [facility] [place]   10/5/2022  4:00 PM REBEKAH Ellis - CNP [facility]            Patient Active Problem List:     Major depressive disorder, recurrent episode with anxious distress ([address]

## 2022-09-28 ENCOUNTER — HOSPITAL ENCOUNTER (OUTPATIENT)
Dept: PULMONOLOGY | Age: 52
Discharge: HOME | End: 2022-09-28
Payer: MEDICAID

## 2022-09-28 VITALS — OXYGEN SATURATION: 93 % | HEART RATE: 83 BPM | RESPIRATION RATE: 20 BRPM

## 2022-09-28 DIAGNOSIS — R06.02 SHORTNESS OF BREATH: ICD-10-CM

## 2022-09-28 PROCEDURE — 6370000000 HC RX 637 (ALT 250 FOR IP): Performed by: INTERNAL MEDICINE

## 2022-09-28 PROCEDURE — 94060 EVALUATION OF WHEEZING: CPT

## 2022-09-28 PROCEDURE — 94726 PLETHYSMOGRAPHY LUNG VOLUMES: CPT

## 2022-09-28 PROCEDURE — 94729 DIFFUSING CAPACITY: CPT

## 2022-09-28 PROCEDURE — 94664 DEMO&/EVAL PT USE INHALER: CPT

## 2022-09-28 RX ORDER — ALBUTEROL SULFATE 90 UG/1
4 AEROSOL, METERED RESPIRATORY (INHALATION) ONCE
Status: COMPLETED | OUTPATIENT
Start: 2022-09-28 | End: 2022-09-28

## 2022-09-28 RX ADMIN — ALBUTEROL SULFATE 4 PUFF: 90 AEROSOL, METERED RESPIRATORY (INHALATION) at 16:40

## 2022-10-05 ENCOUNTER — OFFICE VISIT (OUTPATIENT)
Dept: PRIMARY CARE CLINIC | Age: 52
End: 2022-10-05
Payer: MEDICAID

## 2022-10-05 VITALS
HEART RATE: 91 BPM | TEMPERATURE: 97.3 F | SYSTOLIC BLOOD PRESSURE: 122 MMHG | DIASTOLIC BLOOD PRESSURE: 80 MMHG | WEIGHT: 265.8 LBS | OXYGEN SATURATION: 91 % | RESPIRATION RATE: 18 BRPM | BODY MASS INDEX: 51.91 KG/M2

## 2022-10-05 DIAGNOSIS — M54.17 LUMBOSACRAL RADICULOPATHY: ICD-10-CM

## 2022-10-05 DIAGNOSIS — J44.9 CHRONIC OBSTRUCTIVE PULMONARY DISEASE, UNSPECIFIED COPD TYPE (HCC): Primary | ICD-10-CM

## 2022-10-05 DIAGNOSIS — J98.4 RESTRICTIVE AIRWAY DISEASE: ICD-10-CM

## 2022-10-05 DIAGNOSIS — M51.36 DEGENERATIVE DISC DISEASE, LUMBAR: ICD-10-CM

## 2022-10-05 DIAGNOSIS — H66.002 NON-RECURRENT ACUTE SUPPURATIVE OTITIS MEDIA OF LEFT EAR WITHOUT SPONTANEOUS RUPTURE OF TYMPANIC MEMBRANE: ICD-10-CM

## 2022-10-05 DIAGNOSIS — M54.41 ACUTE RIGHT-SIDED LOW BACK PAIN WITH RIGHT-SIDED SCIATICA: ICD-10-CM

## 2022-10-05 PROCEDURE — 99214 OFFICE O/P EST MOD 30 MIN: CPT | Performed by: NURSE PRACTITIONER

## 2022-10-05 RX ORDER — AMOXICILLIN AND CLAVULANATE POTASSIUM 875; 125 MG/1; MG/1
1 TABLET, FILM COATED ORAL 2 TIMES DAILY
Qty: 20 TABLET | Refills: 0 | Status: SHIPPED | OUTPATIENT
Start: 2022-10-05 | End: 2022-10-15

## 2022-10-05 RX ORDER — TIOTROPIUM BROMIDE 18 UG/1
18 CAPSULE ORAL; RESPIRATORY (INHALATION) DAILY
Qty: 90 CAPSULE | Refills: 1 | Status: SHIPPED | OUTPATIENT
Start: 2022-10-05

## 2022-10-05 RX ORDER — BACLOFEN 10 MG/1
10 TABLET ORAL 3 TIMES DAILY
COMMUNITY

## 2022-10-05 ASSESSMENT — ENCOUNTER SYMPTOMS
GASTROINTESTINAL NEGATIVE: 1
BACK PAIN: 1
SHORTNESS OF BREATH: 1
EYES NEGATIVE: 1
ALLERGIC/IMMUNOLOGIC NEGATIVE: 1
COUGH: 1
WHEEZING: 1

## 2022-10-05 ASSESSMENT — PATIENT HEALTH QUESTIONNAIRE - PHQ9
3. TROUBLE FALLING OR STAYING ASLEEP: 0
2. FEELING DOWN, DEPRESSED OR HOPELESS: 0
6. FEELING BAD ABOUT YOURSELF - OR THAT YOU ARE A FAILURE OR HAVE LET YOURSELF OR YOUR FAMILY DOWN: 0
4. FEELING TIRED OR HAVING LITTLE ENERGY: 0
SUM OF ALL RESPONSES TO PHQ QUESTIONS 1-9: 0
7. TROUBLE CONCENTRATING ON THINGS, SUCH AS READING THE NEWSPAPER OR WATCHING TELEVISION: 0
SUM OF ALL RESPONSES TO PHQ9 QUESTIONS 1 & 2: 0
10. IF YOU CHECKED OFF ANY PROBLEMS, HOW DIFFICULT HAVE THESE PROBLEMS MADE IT FOR YOU TO DO YOUR WORK, TAKE CARE OF THINGS AT HOME, OR GET ALONG WITH OTHER PEOPLE: 0
5. POOR APPETITE OR OVEREATING: 0
9. THOUGHTS THAT YOU WOULD BE BETTER OFF DEAD, OR OF HURTING YOURSELF: 0
SUM OF ALL RESPONSES TO PHQ QUESTIONS 1-9: 0
SUM OF ALL RESPONSES TO PHQ QUESTIONS 1-9: 0
8. MOVING OR SPEAKING SO SLOWLY THAT OTHER PEOPLE COULD HAVE NOTICED. OR THE OPPOSITE, BEING SO FIGETY OR RESTLESS THAT YOU HAVE BEEN MOVING AROUND A LOT MORE THAN USUAL: 0
1. LITTLE INTEREST OR PLEASURE IN DOING THINGS: 0
SUM OF ALL RESPONSES TO PHQ QUESTIONS 1-9: 0

## 2022-10-05 NOTE — PROGRESS NOTES
[facility]  Author [place], [address] [facility] PRIMARY CARE  [address]  Dept: [phone]  Dept Fax: [phone]      Name: Jagjit Higuera  : 1970         Chief Complaint:     Chief Complaint   Patient presents with    Follow-up     Follow up after Pulmonology appt. History of Present Illness:      Jagjit Higuera is a 54 y.o.  female who presents with Follow-up (Follow up after Pulmonology appt. )    Stephan Walker is here today for a routine office visit. She had her pulmonary function test done last week. She is smoking a pack of cigarettes a day and continues to have shortness of breath. She states she knows she needs to quit smoking. She is using the Albuterol inhaler twice a day and nebulizer once a day. She states she will even have shortness of breath while turning in bed. Stephan Walker is complaining of increased back pain. She states she is losing her mobility and having increased difficulty with walking up the stairs. She states her right leg is numb and hard to move at times. She states after so many feet walking her back feels like it will not support her anymore and she needs to lean on something. She states that the pain is managed with Baclofen and Gabapentin. She refuses PT. She is concerned today about the loss of mobility that she is having. Stephan Walker states she is having bilateral ear pain and would like her ears checked. Past Medical History:     Past Medical History:   Diagnosis Date    Anxiety     Depression     Family history of thyroid problem     Hypertension     Obesity     Osteoarthritis     Type 2 diabetes mellitus without complication ([facility] [address]       Reviewed all health maintenance requirements and ordered appropriate tests  There are no preventive care reminders to display for this patient.     Past Surgical History:     Past Surgical History:   Procedure Laterality Date    CHOLECYSTECTOMY          Medications:       Prior to Admission medications    Medication Sig Start Date End Date Taking? Authorizing Provider   baclofen (LIORESAL) 10 MG tablet Take 10 mg by mouth 3 times daily   Yes Historical Provider, MD   tiotropium (Andrea Dimes) 18 MCG inhalation capsule Inhale 1 capsule into the lungs daily 10/5/22  Yes REBEKAH Harris CNP   amoxicillin-clavulanate (AUGMENTIN) 875-125 MG per tablet Take 1 tablet by mouth 2 times daily for 10 days 10/5/22 10/15/22 Yes REBEKAH Harris CNP   meloxicam (MOBIC) 15 MG tablet take 1 tablet by mouth once daily 9/27/22  Yes REBEKAH Harris CNP   gabapentin (NEURONTIN) 300 MG capsule Take 1 capsule by mouth in the morning and 1 capsule at noon and 1 capsule before bedtime. Do all this for 30 days. Intended supply: 90 days.  8/11/22 10/5/22 Yes Spencer Shell MD   albuterol (PROVENTIL) (2.5 MG/3ML) 0.083% nebulizer solution Take 3 mLs by nebulization every 4 hours as needed for Wheezing or Shortness of Breath 8/2/22  Yes REBEKAH Harris CNP   albuterol sulfate HFA (VENTOLIN HFA) 108 (90 Base) MCG/ACT inhaler Inhale 2 puffs into the lungs 4 times daily as needed for Wheezing 7/14/22  Yes REBEKAH Harris CNP   metFORMIN (GLUCOPHAGE) 500 MG tablet Take 1 tablet by mouth 2 times daily (with meals) 7/14/22  Yes REBEKAH Harris CNP   carvedilol (COREG) 6.25 MG tablet Take 1 tablet by mouth 2 times daily 7/1/22  Yes Angelo Cobb PA-C   spironolactone (ALDACTONE) 25 MG tablet Take 1 tablet by mouth daily 6/8/22  Yes REBEKAH Harris CNP   ARIPiprazole (ABILIFY) 15 MG tablet take 1 tablet by mouth once daily 5/16/22  Yes REBEKAH Harris CNP   acetaminophen (TYLENOL) 500 MG tablet Take 500 mg by mouth every 6 hours as needed for Pain   Yes Historical Provider, MD   fenofibrate (TRIGLIDE) 160 MG tablet Take 1 tablet by mouth daily  Patient not taking: Reported on 10/5/2022 7/14/22   REBEKAH Harris CNP   Elastic Bandages & Supports (MEDICAL COMPRESSION STOCKINGS) MISC 1 each by Does not apply route daily as needed (Wear Daily)  Patient not taking: Reported on 8/2/2022 6/8/22 7/8/22  REBEKAH Chaidez CNP        Allergies: Other    Social History:     Tobacco:    reports that she has been smoking cigarettes. She has a 28.00 pack-year smoking history. She has never used smokeless tobacco.  Alcohol:      reports that she does not currently use alcohol. Drug Use:  reports that she does not currently use drugs. Family History:     Family History   Problem Relation Age of Onset    Other Mother     Diabetes Father     Heart Disease Father 27    Cancer Maternal Grandfather        Review of Systems:     Positive and Negative as described in HPI    Review of Systems   Constitutional: Negative. Negative for fever. HENT:  Positive for ear pain. Eyes: Negative. Respiratory:  Positive for cough, shortness of breath and wheezing. Cardiovascular: Negative. Gastrointestinal: Negative. Endocrine: Negative. Genitourinary: Negative. Musculoskeletal:  Positive for arthralgias and back pain. Skin: Negative. Allergic/Immunologic: Negative. Neurological: Negative. Hematological: Negative. Psychiatric/Behavioral: Negative. Physical Exam:   Vitals:  /80   Pulse 91   Temp 97.3 °F (36.3 °C) (Temporal)   Resp 18   Wt 265 lb 12.8 oz (120.6 kg)   LMP 07/27/2020   SpO2 91%   BMI 51.91 kg/m²     Physical Exam  Vitals and nursing note reviewed. Constitutional:       Appearance: Normal appearance. HENT:      Head: Normocephalic. Right Ear: Tympanic membrane is erythematous. Left Ear: Tympanic membrane is injected and erythematous. Nose: Rhinorrhea present. Rhinorrhea is clear. Mouth/Throat:      Lips: Pink. Mouth: Mucous membranes are moist.      Pharynx: Oropharynx is clear.    Eyes:      Conjunctiva/sclera: Conjunctivae normal.      Pupils: Pupils are equal, round, and reactive to light. Cardiovascular:      Rate and Rhythm: Normal rate and regular rhythm. Heart sounds: Normal heart sounds. Pulmonary:      Effort: Pulmonary effort is normal. Prolonged expiration present. Breath sounds: Examination of the right-lower field reveals decreased breath sounds. Examination of the left-lower field reveals decreased breath sounds. Decreased breath sounds present. No wheezing. Musculoskeletal:      Cervical back: Normal and normal range of motion. Thoracic back: Tenderness present. Lumbar back: Spasms and tenderness present. Decreased range of motion. Back:    Skin:     General: Skin is warm. Capillary Refill: Capillary refill takes less than 2 seconds. Neurological:      General: No focal deficit present. Mental Status: She is alert and oriented to person, place, and time.    Psychiatric:         Mood and Affect: Mood normal.         Behavior: Behavior normal.       Data:     Lab Results   Component Value Date/Time     06/10/2022 11:31 AM    K 4.5 06/10/2022 11:31 AM     06/10/2022 11:31 AM    CO2 24 06/10/2022 11:31 AM    BUN 11 06/10/2022 11:31 AM    CREATININE 0.74 06/10/2022 11:31 AM    GLUCOSE 89 06/10/2022 11:31 AM    PROT 7.3 03/21/2022 11:02 PM    LABALBU 4.1 03/21/2022 11:02 PM    BILITOT 0.16 03/21/2022 11:02 PM    ALKPHOS 109 03/21/2022 11:02 PM    AST 23 03/21/2022 11:02 PM    ALT 25 03/21/2022 11:02 PM     Lab Results   Component Value Date/Time    WBC 8.6 06/10/2022 11:31 AM    RBC 5.26 06/10/2022 11:31 AM    HGB 15.9 06/10/2022 11:31 AM    HCT 49.0 06/10/2022 11:31 AM    MCV 93.2 06/10/2022 11:31 AM    MCH 30.2 06/10/2022 11:31 AM    MCHC 32.4 06/10/2022 11:31 AM    RDW 14.7 06/10/2022 11:31 AM     06/10/2022 11:31 AM    MPV 9.5 06/10/2022 11:31 AM     Lab Results   Component Value Date/Time    TSH 1.71 06/10/2022 11:30 AM     Lab Results   Component Value Date/Time    CHOL 213 07/01/2022 12:10 PM    HDL 55 07/01/2022 12:10 PM    LABA1C 6.3 07/01/2022 12:09 PM       Assessment/Plan:      Diagnosis Orders   1. Chronic obstructive pulmonary disease, unspecified COPD type ([facility] [address]  tiotropium (SPIRIVA HANDIHALER) 18 MCG inhalation capsule      2. Restrictive airway disease  tiotropium (SPIRIVA HANDIHALER) 18 MCG inhalation capsule      3. Acute right-sided low back pain with right-sided sciatica  External Referral To Neurosurgery      4. Lumbosacral radiculopathy  External Referral To Neurosurgery      5. Degenerative disc disease, lumbar  External Referral To Neurosurgery      6. Non-recurrent acute suppurative otitis media of left ear without spontaneous rupture of tympanic membrane  amoxicillin-clavulanate (AUGMENTIN) 875-125 MG per tablet        COPD-Discontinue Pulmicort inhaler. Start Spiriva inhaler daily. Use Albuterol as needed. Spent time educating patient on importance of smoking cessation to improve symptoms. Right sided sciatica with lower back pain-Worsening. Referral to Neurosurgery. Otitis media-Augmentin as prescribed. 1.  Kyrie Sanchez received counseling on the following healthy behaviors: nutrition, exercise, medication adherence, and tobacco cessation  2. Patient given educational materials - see patient instructions  3. Was a self-tracking handout given in paper form or via [employer]? No  If yes, see orders or list here. 4.  Discussed use, benefit, and side effects of prescribed medications. Barriers to medication compliance addressed. All patient questions answered. Pt voiced understanding. 5.  Reviewed prior labs and health maintenance  6. Continue current medications, diet and exercise.     Completed Refills   Requested Prescriptions     Signed Prescriptions Disp Refills    tiotropium (SPIRIVA HANDIHALER) 18 MCG inhalation capsule 90 capsule 1     Sig: Inhale 1 capsule into the lungs daily    amoxicillin-clavulanate (AUGMENTIN) 875-125 MG per tablet 20 tablet 0     Sig: Take 1 tablet by mouth 2 times daily for 10 days         Return in about 1 month (around 11/5/2022).

## 2022-10-05 NOTE — PROCEDURES
[address] [place], [place] [address]                               PULMONARY FUNCTION    PATIENT NAME: Elder Roth                     :        1970  MED REC NO:   [id number]                              ROOM:  ACCOUNT NO:   [de-identified]                           ADMIT DATE: 2022  PROVIDER:     Victor Manuel Conway    DATE OF PROCEDURE:  2022    FINDINGS:  The patient's spirometry shows a restrictive flow-volume  loop. FEV1 is 58% of predicted, FVC 56% of predicted. There was no  positive bronchodilator change. FEV1/FVC ratio is 82. Total lung capacity 103% of predicted, % of predicted, and slow  vital capacity 49% of predicted, which is decreased. Diffusion capacity  uncorrected 88% of predicted, corrected for alveolar volume 96% of  predicted. FINAL IMPRESSION:  The study shows an extrapulmonic restrictive lung  disease with moderate ventilatory dysfunction. Residual volume is  increased consistent with hyperinflation. Clinical correlation advised. Geno Sanon    D: 10/04/2022 19:26:22       T: 10/04/2022 19:28:34     [email or web address]  Job#: [id number]     Doc#: [id number]    CC:  [facility] MEDICINE ANGELES Coles, APRN-CNP

## 2022-10-05 NOTE — PATIENT INSTRUCTIONS
SURVEY:     You may be receiving a survey from [employer] regarding your visit today. Please complete the survey to enable us to provide the highest quality of care to you and your family. If you cannot score us a very good on any question, please call the office to discuss how we could have made your experience a very good one.      Thank you,    Jose Rothman, APRN-CNP  Khushboo Beavers, APRN-CNP  Dwight Vargas, GAIL Carrion, YOLANDA Ramon, YOLANDA Botello, CMA  Cookie, PCA  Nithya, PM

## 2022-10-06 DIAGNOSIS — R60.9 PERIPHERAL EDEMA: ICD-10-CM

## 2022-10-06 DIAGNOSIS — R60.0 EDEMA OF BOTH LOWER LEGS: ICD-10-CM

## 2022-10-06 RX ORDER — SPIRONOLACTONE 25 MG/1
TABLET ORAL
Qty: 90 TABLET | Refills: 3 | Status: SHIPPED | OUTPATIENT
Start: 2022-10-06

## 2022-11-02 DIAGNOSIS — J44.9 CHRONIC OBSTRUCTIVE PULMONARY DISEASE, UNSPECIFIED COPD TYPE (HCC): ICD-10-CM

## 2022-11-02 RX ORDER — ARIPIPRAZOLE 15 MG/1
TABLET ORAL
Qty: 90 TABLET | Refills: 1 | Status: SHIPPED | OUTPATIENT
Start: 2022-11-02

## 2022-11-02 RX ORDER — ALBUTEROL SULFATE 2.5 MG/3ML
SOLUTION RESPIRATORY (INHALATION)
Qty: 375 ML | Refills: 3 | Status: SHIPPED | OUTPATIENT
Start: 2022-11-02

## 2022-11-02 NOTE — TELEPHONE ENCOUNTER
Health Maintenance   Topic Date Due    Cervical cancer screen  11/24/2022 (Originally 11/17/1993)    COVID-19 Vaccine (3 - Booster for Pfizer series) 03/24/2023 (Originally 11/29/2021)    DTaP/Tdap/Td vaccine (1 - Tdap) 06/08/2023 (Originally 11/17/1991)    Pneumococcal 0-64 years Vaccine (1 - PCV) 06/08/2023 (Originally 11/17/1978)    Hepatitis C screen  06/08/2023 (Originally 11/17/1990)    HIV screen  06/08/2023 (Originally 11/17/1987)    Flu vaccine (1) 10/05/2023 (Originally 8/1/2022)    Colorectal Cancer Screen  02/15/2023    A1C test (Diabetic or Prediabetic)  07/01/2023    Depression Monitoring  10/05/2023    Lipids  07/01/2027    Hepatitis A vaccine  Aged Out    Hib vaccine  Aged Out    Meningococcal (ACWY) vaccine  Aged Out             (applicable per patient's age: Cancer Screenings, Depression Screening, Fall Risk Screening, Immunizations)    Hemoglobin A1C (%)   Date Value   07/01/2022 6.3 (H)   05/20/2021 5.9   05/14/2021 5.6     LDL Cholesterol (mg/dL)   Date Value   07/01/2022 110     AST (U/L)   Date Value   03/21/2022 23     ALT (U/L)   Date Value   03/21/2022 25     BUN (mg/dL)   Date Value   06/10/2022 11      (goal A1C is < 7)   (goal LDL is <100) need 30-50% reduction from baseline     BP Readings from Last 3 Encounters:   10/05/22 122/80   08/11/22 129/70   08/02/22 120/68    (goal /80)      All Future Testing planned in [facility]:  Lab Frequency Next Occurrence       Next Visit Date:  Future Appointments   Date Time Provider Pao Elias   11/8/2022  4:00 PM REBEKAH Castillo - CNP [facility]            Patient Active Problem List:     Major depressive disorder, recurrent episode with anxious distress ([facility] [address]

## 2022-11-08 ENCOUNTER — OFFICE VISIT (OUTPATIENT)
Dept: PRIMARY CARE CLINIC | Age: 52
End: 2022-11-08
Payer: MEDICAID

## 2022-11-08 VITALS
HEART RATE: 79 BPM | BODY MASS INDEX: 52.89 KG/M2 | SYSTOLIC BLOOD PRESSURE: 118 MMHG | RESPIRATION RATE: 18 BRPM | OXYGEN SATURATION: 94 % | TEMPERATURE: 97.2 F | WEIGHT: 270.8 LBS | DIASTOLIC BLOOD PRESSURE: 74 MMHG

## 2022-11-08 DIAGNOSIS — M51.36 DEGENERATIVE DISC DISEASE, LUMBAR: ICD-10-CM

## 2022-11-08 DIAGNOSIS — E11.9 TYPE 2 DIABETES MELLITUS WITHOUT COMPLICATION, WITHOUT LONG-TERM CURRENT USE OF INSULIN (HCC): ICD-10-CM

## 2022-11-08 DIAGNOSIS — F17.200 SMOKING ADDICTION: ICD-10-CM

## 2022-11-08 DIAGNOSIS — J44.9 CHRONIC OBSTRUCTIVE PULMONARY DISEASE, UNSPECIFIED COPD TYPE (HCC): Primary | ICD-10-CM

## 2022-11-08 LAB — HBA1C MFR BLD: 5.9 %

## 2022-11-08 PROCEDURE — 3044F HG A1C LEVEL LT 7.0%: CPT | Performed by: NURSE PRACTITIONER

## 2022-11-08 PROCEDURE — 83036 HEMOGLOBIN GLYCOSYLATED A1C: CPT | Performed by: NURSE PRACTITIONER

## 2022-11-08 PROCEDURE — 99214 OFFICE O/P EST MOD 30 MIN: CPT | Performed by: NURSE PRACTITIONER

## 2022-11-08 RX ORDER — VARENICLINE TARTRATE 0.5 MG/1
.5-1 TABLET, FILM COATED ORAL SEE ADMIN INSTRUCTIONS
Qty: 57 TABLET | Refills: 0 | Status: SHIPPED | OUTPATIENT
Start: 2022-11-08

## 2022-11-08 ASSESSMENT — ENCOUNTER SYMPTOMS
WHEEZING: 1
BACK PAIN: 1
SHORTNESS OF BREATH: 1
GASTROINTESTINAL NEGATIVE: 1
EYES NEGATIVE: 1

## 2022-11-08 NOTE — PROGRESS NOTES
[facility]  [place], [address] [facility] PRIMARY CARE  [address]  Dept: [phone]  Dept Fax: [phone]      Name: Mitul Cortes  : 1970         Chief Complaint:     Chief Complaint   Patient presents with    COPD     1 month f/u. Patient feeling better. Wanting something to help stop smoking. History of Present Illness:      Mitul Cortes is a 54 y.o.  female who presents with COPD (1 month f/u. Patient feeling better. Wanting something to help stop smoking. )    Kyrie Sanchez is here today for a 1 month follow up. She states she has been having an improvement in her breathing since she started Spiriva. She states today that she would like to try Chantix to quit smoking. She states she has cut back to half a pack a day from 2 packs a day. She did follow up with Neurosurgery. She is going to start injections. She was told if that fails they will discuss surgery. She does have partial paralysis on her right side and may not get her mobility back. She states that the surgeon was concerned about the injections raising her blood sugars. Past Medical History:     Past Medical History:   Diagnosis Date    Anxiety     Depression     Family history of thyroid problem     Hypertension     Obesity     Osteoarthritis     Type 2 diabetes mellitus without complication ([facility] [address]       Reviewed all health maintenance requirements and ordered appropriate tests  There are no preventive care reminders to display for this patient. Past Surgical History:     Past Surgical History:   Procedure Laterality Date    CHOLECYSTECTOMY          Medications:       Prior to Admission medications    Medication Sig Start Date End Date Taking?  Authorizing Provider   varenicline (CHANTIX) 0.5 MG tablet Take 1-2 tablets by mouth See Admin Instructions 0.5mg DAILY for 3 days followed by 0.5mg TWICE DAILY for 4 days followed by 1mg TWICE DAILY 22  Yes Monroe Velasco, REBEKAH - CNP   albuterol (PROVENTIL) (2.5 MG/3ML) 0.083% nebulizer solution inhale contents of 1 vial ( 3 milliliters ) in nebulizer by mouth and INTO THE LUNGS every 4 hours if needed for wheezing or shortness of breath 11/2/22  Yes Cannon Goldmann Might, APRN - CNP   ARIPiprazole (ABILIFY) 15 MG tablet take 1 tablet by mouth once daily 11/2/22  Yes Cannon Goldmann Might, APRN - CNP   spironolactone (ALDACTONE) 25 MG tablet take 1 tablet by mouth once daily 10/6/22  Yes REBEKAH Morales CNP   baclofen (LIORESAL) 10 MG tablet Take 10 mg by mouth 3 times daily   Yes Historical Provider, MD   tiotropium (SPIRIVA HANDIHALER) 18 MCG inhalation capsule Inhale 1 capsule into the lungs daily 10/5/22  Yes REBEKAH Morales CNP   meloxicam (MOBIC) 15 MG tablet take 1 tablet by mouth once daily 9/27/22  Yes REBEKAH Morales CNP   gabapentin (NEURONTIN) 300 MG capsule Take 1 capsule by mouth in the morning and 1 capsule at noon and 1 capsule before bedtime. Do all this for 30 days. Intended supply: 90 days.  8/11/22 11/8/22 Yes Jennifer Coy MD   albuterol sulfate HFA (VENTOLIN HFA) 108 (90 Base) MCG/ACT inhaler Inhale 2 puffs into the lungs 4 times daily as needed for Wheezing 7/14/22  Yes REBEKAH Morales CNP   metFORMIN (GLUCOPHAGE) 500 MG tablet Take 1 tablet by mouth 2 times daily (with meals) 7/14/22  Yes REBEKAH Morales CNP   carvedilol (COREG) 6.25 MG tablet Take 1 tablet by mouth 2 times daily 7/1/22  Yes Warner Lea PA-C   acetaminophen (TYLENOL) 500 MG tablet Take 500 mg by mouth every 6 hours as needed for Pain   Yes Historical Provider, MD   fenofibrate (TRIGLIDE) 160 MG tablet Take 1 tablet by mouth daily  Patient not taking: No sig reported 7/14/22   REBEKAH Morales CNP   Elastic Bandages & Supports ([facility]) [facility] 1 each by Does not apply route daily as needed (Wear Daily)  Patient not taking: Reported on 8/2/2022 6/8/22 7/8/22  REBEKAH Morales - CNP        Allergies: Other    Social History:     Tobacco:    reports that she has been smoking cigarettes. She has a 28.00 pack-year smoking history. She has never used smokeless tobacco.  Alcohol:      reports that she does not currently use alcohol. Drug Use:  reports that she does not currently use drugs. Family History:     Family History   Problem Relation Age of Onset    Other Mother     Diabetes Father     Heart Disease Father 27    Cancer Maternal Grandfather        Review of Systems:     Positive and Negative as described in HPI    Review of Systems   Constitutional: Negative. HENT: Negative. Eyes: Negative. Respiratory:  Positive for shortness of breath and wheezing. Cardiovascular: Negative. Gastrointestinal: Negative. Endocrine: Negative. Genitourinary: Negative. Musculoskeletal:  Positive for arthralgias and back pain. Skin: Negative. Allergic/Immunologic: Positive for environmental allergies. Hematological: Negative. Psychiatric/Behavioral: Negative. Physical Exam:   Vitals:  /74   Pulse 79   Temp 97.2 °F (36.2 °C) (Temporal)   Resp 18   Wt 270 lb 12.8 oz (122.8 kg)   LMP 07/27/2020   SpO2 94%   BMI 52.89 kg/m²     Physical Exam  Vitals and nursing note reviewed. Constitutional:       Appearance: Normal appearance. HENT:      Head: Normocephalic. Right Ear: External ear normal.      Left Ear: External ear normal.      Nose: Nose normal.      Mouth/Throat:      Mouth: Mucous membranes are moist.      Pharynx: Oropharynx is clear. Eyes:      Conjunctiva/sclera: Conjunctivae normal.      Pupils: Pupils are equal, round, and reactive to light. Cardiovascular:      Rate and Rhythm: Normal rate and regular rhythm. Heart sounds: Normal heart sounds. Pulmonary:      Effort: Pulmonary effort is normal.      Breath sounds: Normal breath sounds. Musculoskeletal:      Cervical back: Normal range of motion. Tenderness present. Thoracic back: Normal.      Lumbar back: Spasms, tenderness and bony tenderness present. No swelling. Decreased range of motion. Back:    Skin:     General: Skin is warm. Capillary Refill: Capillary refill takes less than 2 seconds. Neurological:      General: No focal deficit present. Mental Status: She is alert and oriented to person, place, and time. Psychiatric:         Mood and Affect: Mood normal.         Behavior: Behavior normal.         Thought Content: Thought content normal.       Data:     Lab Results   Component Value Date/Time     06/10/2022 11:31 AM    K 4.5 06/10/2022 11:31 AM     06/10/2022 11:31 AM    CO2 24 06/10/2022 11:31 AM    BUN 11 06/10/2022 11:31 AM    CREATININE 0.74 06/10/2022 11:31 AM    GLUCOSE 89 06/10/2022 11:31 AM    PROT 7.3 03/21/2022 11:02 PM    LABALBU 4.1 03/21/2022 11:02 PM    BILITOT 0.16 03/21/2022 11:02 PM    ALKPHOS 109 03/21/2022 11:02 PM    AST 23 03/21/2022 11:02 PM    ALT 25 03/21/2022 11:02 PM     Lab Results   Component Value Date/Time    WBC 8.6 06/10/2022 11:31 AM    RBC 5.26 06/10/2022 11:31 AM    HGB 15.9 06/10/2022 11:31 AM    HCT 49.0 06/10/2022 11:31 AM    MCV 93.2 06/10/2022 11:31 AM    MCH 30.2 06/10/2022 11:31 AM    MCHC 32.4 06/10/2022 11:31 AM    RDW 14.7 06/10/2022 11:31 AM     06/10/2022 11:31 AM    MPV 9.5 06/10/2022 11:31 AM     Lab Results   Component Value Date/Time    TSH 1.71 06/10/2022 11:30 AM     Lab Results   Component Value Date/Time    CHOL 213 07/01/2022 12:10 PM    HDL 55 07/01/2022 12:10 PM    LABA1C 5.9 11/08/2022 04:18 PM       Assessment/Plan:      Diagnosis Orders   1. Chronic obstructive pulmonary disease, unspecified COPD type ([facility]        2. Type 2 diabetes mellitus without complication, without long-term current use of insulin (HCC)  POCT glycosylated hemoglobin (Hb A1C)      3. Degenerative disc disease, lumbar        4.  Smoking addiction  varenicline (CHANTIX) 0.5 MG tablet Continue using Spiriva daily. Use Albuterol as needed. Prescribed varenicline. Potential side effects and safe use reviewed. Smoking cessation counseling provided. Individualized cessation plan includes Chantix, books, and apps on phone. A1C to get baseline before steroid injections. Continue with Neurosurgery follow ups. 1.  Elder Clarke received counseling on the following healthy behaviors: nutrition, exercise, medication adherence, and tobacco cessation  2. Patient given educational materials - see patient instructions  3. Was a self-tracking handout given in paper form or via [employer]? No  If yes, see orders or list here. 4.  Discussed use, benefit, and side effects of prescribed medications. Barriers to medication compliance addressed. All patient questions answered. Pt voiced understanding. 5.  Reviewed prior labs and health maintenance  6. Continue current medications, diet and exercise. Completed Refills   Requested Prescriptions     Signed Prescriptions Disp Refills    varenicline (CHANTIX) 0.5 MG tablet 57 tablet 0     Sig: Take 1-2 tablets by mouth See Admin Instructions 0.5mg DAILY for 3 days followed by 0.5mg TWICE DAILY for 4 days followed by 1mg TWICE DAILY         No follow-ups on file.

## 2023-01-27 DIAGNOSIS — E11.9 TYPE 2 DIABETES MELLITUS WITHOUT COMPLICATION, WITHOUT LONG-TERM CURRENT USE OF INSULIN (HCC): ICD-10-CM

## 2023-01-27 NOTE — TELEPHONE ENCOUNTER
Health Maintenance   Topic Date Due    Diabetic foot exam  Never done    Diabetic Alb to Cr ratio (uACR) test  Never done    Diabetic retinal exam  Never done    Hepatitis B vaccine (1 of 3 - Risk 3-dose series) Never done    Cervical cancer screen  Never done    Breast cancer screen  Never done    Shingles vaccine (1 of 2) Never done    Low dose CT lung screening  Never done    Colorectal Cancer Screen  02/15/2023    COVID-19 Vaccine (3 - Booster for Pfizer series) 03/24/2023 (Originally 11/29/2021)    DTaP/Tdap/Td vaccine (1 - Tdap) 06/08/2023 (Originally 11/17/1991)    Pneumococcal 0-64 years Vaccine (1 - PCV) 06/08/2023 (Originally 11/17/1978)    Hepatitis C screen  06/08/2023 (Originally 11/17/1990)    HIV screen  06/08/2023 (Originally 11/17/1987)    Flu vaccine (1) 10/05/2023 (Originally 8/1/2022)    GFR test (Diabetes, CKD 3-4, OR last GFR 15-59)  06/10/2023    Lipids  07/01/2023    Depression Monitoring  10/05/2023    A1C test (Diabetic or Prediabetic)  11/08/2023    Hepatitis A vaccine  Aged Out    Hib vaccine  Aged Out    Meningococcal (ACWY) vaccine  Aged Out             (applicable per patient's age: Cancer Screenings, Depression Screening, Fall Risk Screening, Immunizations)    Hemoglobin A1C (%)   Date Value   11/08/2022 5.9   07/01/2022 6.3 (H)   05/20/2021 5.9     LDL Cholesterol (mg/dL)   Date Value   07/01/2022 110     AST (U/L)   Date Value   03/21/2022 23     ALT (U/L)   Date Value   03/21/2022 25     BUN (mg/dL)   Date Value   06/10/2022 11      (goal A1C is < 7)   (goal LDL is <100) need 30-50% reduction from baseline     BP Readings from Last 3 Encounters:   11/08/22 118/74   10/05/22 122/80   08/11/22 129/70    (goal /80)      All Future Testing planned in [facility]:  Lab Frequency Next Occurrence       Next Visit Date:  Future Appointments   Date Time Provider Pao Elias   2/7/2023  4:00 PM Francisco Javier Garcia, APRN - CNP [facility]            Patient Active Problem List: Major depressive disorder, recurrent episode with anxious distress ([facility]

## 2023-02-07 ENCOUNTER — OFFICE VISIT (OUTPATIENT)
Dept: PRIMARY CARE CLINIC | Age: 53
End: 2023-02-07
Payer: MEDICAID

## 2023-02-07 VITALS
RESPIRATION RATE: 18 BRPM | BODY MASS INDEX: 54.18 KG/M2 | TEMPERATURE: 98.6 F | SYSTOLIC BLOOD PRESSURE: 122 MMHG | WEIGHT: 277.4 LBS | DIASTOLIC BLOOD PRESSURE: 84 MMHG | HEART RATE: 81 BPM | OXYGEN SATURATION: 96 %

## 2023-02-07 DIAGNOSIS — J44.9 CHRONIC OBSTRUCTIVE PULMONARY DISEASE, UNSPECIFIED COPD TYPE (HCC): Primary | ICD-10-CM

## 2023-02-07 DIAGNOSIS — E11.9 TYPE 2 DIABETES MELLITUS WITHOUT COMPLICATION, WITHOUT LONG-TERM CURRENT USE OF INSULIN (HCC): ICD-10-CM

## 2023-02-07 DIAGNOSIS — M51.36 DEGENERATIVE DISC DISEASE, LUMBAR: ICD-10-CM

## 2023-02-07 DIAGNOSIS — Z12.31 OTHER SCREENING MAMMOGRAM: ICD-10-CM

## 2023-02-07 DIAGNOSIS — J98.4 RESTRICTIVE AIRWAY DISEASE: ICD-10-CM

## 2023-02-07 LAB — HBA1C MFR BLD: 5.7 %

## 2023-02-07 PROCEDURE — 83036 HEMOGLOBIN GLYCOSYLATED A1C: CPT | Performed by: NURSE PRACTITIONER

## 2023-02-07 PROCEDURE — 99214 OFFICE O/P EST MOD 30 MIN: CPT | Performed by: NURSE PRACTITIONER

## 2023-02-07 PROCEDURE — 3044F HG A1C LEVEL LT 7.0%: CPT | Performed by: NURSE PRACTITIONER

## 2023-02-07 RX ORDER — TIOTROPIUM BROMIDE 18 UG/1
18 CAPSULE ORAL; RESPIRATORY (INHALATION) DAILY
Qty: 90 CAPSULE | Refills: 1 | Status: SHIPPED | OUTPATIENT
Start: 2023-02-07 | End: 2023-02-07 | Stop reason: ALTCHOICE

## 2023-02-07 RX ORDER — ALBUTEROL SULFATE 90 UG/1
2 AEROSOL, METERED RESPIRATORY (INHALATION) 4 TIMES DAILY PRN
Qty: 54 G | Refills: 3 | Status: SHIPPED | OUTPATIENT
Start: 2023-02-07 | End: 2023-03-09

## 2023-02-07 RX ORDER — MELOXICAM 15 MG/1
TABLET ORAL
Qty: 90 TABLET | Refills: 1 | Status: SHIPPED | OUTPATIENT
Start: 2023-02-07

## 2023-02-07 RX ORDER — BACLOFEN 10 MG/1
10 TABLET ORAL 3 TIMES DAILY
Qty: 270 TABLET | Refills: 0 | Status: SHIPPED | OUTPATIENT
Start: 2023-02-07

## 2023-02-07 SDOH — ECONOMIC STABILITY: HOUSING INSECURITY
IN THE LAST 12 MONTHS, WAS THERE A TIME WHEN YOU DID NOT HAVE A STEADY PLACE TO SLEEP OR SLEPT IN A SHELTER (INCLUDING NOW)?: NO

## 2023-02-07 SDOH — ECONOMIC STABILITY: INCOME INSECURITY: HOW HARD IS IT FOR YOU TO PAY FOR THE VERY BASICS LIKE FOOD, HOUSING, MEDICAL CARE, AND HEATING?: NOT HARD AT ALL

## 2023-02-07 SDOH — ECONOMIC STABILITY: FOOD INSECURITY: WITHIN THE PAST 12 MONTHS, YOU WORRIED THAT YOUR FOOD WOULD RUN OUT BEFORE YOU GOT MONEY TO BUY MORE.: NEVER TRUE

## 2023-02-07 SDOH — ECONOMIC STABILITY: FOOD INSECURITY: WITHIN THE PAST 12 MONTHS, THE FOOD YOU BOUGHT JUST DIDN'T LAST AND YOU DIDN'T HAVE MONEY TO GET MORE.: NEVER TRUE

## 2023-02-07 ASSESSMENT — PATIENT HEALTH QUESTIONNAIRE - PHQ9
10. IF YOU CHECKED OFF ANY PROBLEMS, HOW DIFFICULT HAVE THESE PROBLEMS MADE IT FOR YOU TO DO YOUR WORK, TAKE CARE OF THINGS AT HOME, OR GET ALONG WITH OTHER PEOPLE: 0
7. TROUBLE CONCENTRATING ON THINGS, SUCH AS READING THE NEWSPAPER OR WATCHING TELEVISION: 0
SUM OF ALL RESPONSES TO PHQ QUESTIONS 1-9: 0
8. MOVING OR SPEAKING SO SLOWLY THAT OTHER PEOPLE COULD HAVE NOTICED. OR THE OPPOSITE, BEING SO FIGETY OR RESTLESS THAT YOU HAVE BEEN MOVING AROUND A LOT MORE THAN USUAL: 0
9. THOUGHTS THAT YOU WOULD BE BETTER OFF DEAD, OR OF HURTING YOURSELF: 0
SUM OF ALL RESPONSES TO PHQ9 QUESTIONS 1 & 2: 0
SUM OF ALL RESPONSES TO PHQ QUESTIONS 1-9: 0
SUM OF ALL RESPONSES TO PHQ QUESTIONS 1-9: 0
4. FEELING TIRED OR HAVING LITTLE ENERGY: 0
SUM OF ALL RESPONSES TO PHQ QUESTIONS 1-9: 0
3. TROUBLE FALLING OR STAYING ASLEEP: 0
2. FEELING DOWN, DEPRESSED OR HOPELESS: 0
6. FEELING BAD ABOUT YOURSELF - OR THAT YOU ARE A FAILURE OR HAVE LET YOURSELF OR YOUR FAMILY DOWN: 0
1. LITTLE INTEREST OR PLEASURE IN DOING THINGS: 0
5. POOR APPETITE OR OVEREATING: 0

## 2023-02-07 NOTE — PATIENT INSTRUCTIONS
SURVEY:     You may be receiving a survey from [employer] regarding your visit today. Please complete the survey to enable us to provide the highest quality of care to you and your family. If you cannot score us a very good on any question, please call the office to discuss how we could have made your experience a very good one.      Thank you,    Amparo Rothman, APRN-CNP  Palma Velasquez, APRN-CNP  Coco Mason, LPN  Latrell López, CMA  Blue Marcelo, CMA  Rosmery, CMA  Cookie, PCA  Nithya, PM

## 2023-02-07 NOTE — PROGRESS NOTES
[facility]  [facility], [address] [facility]  [address]  Dept: [phone]  Dept Fax: [phone]      Name: Harry Hill  : 1970         Chief Complaint:     Chief Complaint   Patient presents with    COPD     3 month check. Patient having a hard time with her breathing. Patient would like to discuss getting prescribed Prednisone. Back Pain     C/o back pain and patient is waiting for an ablation. History of Present Illness:      Harry Hill is a 50 y.o.  female who presents with COPD (3 month check. Patient having a hard time with her breathing. Patient would like to discuss getting prescribed Prednisone. ) and Back Pain (C/o back pain and patient is waiting for an ablation. )    Corbin Aguilar is here today for a routine office visit. She has been using Spiriva daily and she uses the Albuterol 5 times a day and continues to have shortness of breath with activity. She states she is okay if she sits but even rolling over in bed she has increased shortness of breath. She states she has cut back on her smoking to 2 cigarettes a day. Corbin Aguilar continues to states she has low back pain. She is following up with pain management and waiting to get an ablation.     Past Medical History:     Past Medical History:   Diagnosis Date    Anxiety     Depression     Family history of thyroid problem     Hypertension     Obesity     Osteoarthritis     Type 2 diabetes mellitus without complication ([facility]       Reviewed all health maintenance requirements and ordered appropriate tests  Health Maintenance Due   Topic Date Due    Diabetic foot exam  Never done    Diabetic Alb to Cr ratio (uACR) test  Never done    Diabetic retinal exam  Never done    Cervical cancer screen  Never done    Breast cancer screen  Never done    Shingles vaccine (1 of 2) Never done    Low dose CT lung screening  Never done    Colorectal Cancer Screen  02/15/2023       Past Surgical History:     Past Surgical History:   Procedure Laterality Date    CHOLECYSTECTOMY          Medications:       Prior to Admission medications    Medication Sig Start Date End Date Taking? Authorizing Provider   meloxicam (MOBIC) 15 MG tablet take 1 tablet by mouth once daily 2/7/23  Yes REBEKAH Yeung CNP   baclofen (LIORESAL) 10 MG tablet Take 1 tablet by mouth 3 times daily 2/7/23  Yes REBEKAH Yeung CNP   albuterol sulfate HFA (VENTOLIN HFA) 108 (90 Base) MCG/ACT inhaler Inhale 2 puffs into the lungs 4 times daily as needed for Shortness of Breath 2/7/23 3/9/23 Yes REBEKAH Yeung CNP   fluticasone-salmeterol (ADVAIR HFA) 230-21 MCG/ACT inhaler Inhale 2 puffs into the lungs 2 times daily 2/7/23 3/9/23 Yes REBEKAH Yeung CNP   blood glucose monitor kit and supplies Dispense sufficient amount for indicated testing frequency plus additional to accommodate PRN testing needs. Dispense all needed supplies to include: monitor, strips, lancing device, lancets, control solutions, alcohol swabs.  2/7/23  Yes REBEKAH Yeung CNP   metFORMIN (GLUCOPHAGE) 500 MG tablet take 1 tablet by mouth twice a day with meals 1/27/23  Yes REBEKAH Warren CNP   varenicline (CHANTIX) 0.5 MG tablet Take 1-2 tablets by mouth See Admin Instructions 0.5mg DAILY for 3 days followed by 0.5mg TWICE DAILY for 4 days followed by 1mg TWICE DAILY 11/8/22  Yes REBEKAH Yeung CNP   albuterol (PROVENTIL) (2.5 MG/3ML) 0.083% nebulizer solution inhale contents of 1 vial ( 3 milliliters ) in nebulizer by mouth and INTO THE LUNGS every 4 hours if needed for wheezing or shortness of breath 11/2/22  Yes Maribell REBEKAH Bridges CNP   ARIPiprazole (ABILIFY) 15 MG tablet take 1 tablet by mouth once daily 11/2/22  Yes Maribell REBEKAH Bridges CNP   spironolactone (ALDACTONE) 25 MG tablet take 1 tablet by mouth once daily 10/6/22  Yes Phong Loulou, APRN - CNP   gabapentin (NEURONTIN) 300 MG capsule Take 1 capsule by mouth in the morning and 1 capsule at noon and 1 capsule before bedtime. Do all this for 30 days. Intended supply: 90 days. 8/11/22 2/7/23 Yes Lawrence Hernandez MD   carvedilol (COREG) 6.25 MG tablet Take 1 tablet by mouth 2 times daily 7/1/22  Yes Vianey Yung PA-C   acetaminophen (TYLENOL) 500 MG tablet Take 500 mg by mouth every 6 hours as needed for Pain   Yes Historical Provider, MD   fenofibrate (TRIGLIDE) 160 MG tablet Take 1 tablet by mouth daily  Patient not taking: No sig reported 7/14/22   REBEKAH Flores CNP   Elastic Bandages & Supports ([address] [facility]) [address] each by Does not apply route daily as needed (Wear Daily)  Patient not taking: Reported on 8/2/2022 6/8/22 7/8/22  REBEKAH Flores CNP        Allergies: Other    Social History:     Tobacco:    reports that she has been smoking cigarettes. She has a 28.00 pack-year smoking history. She has never used smokeless tobacco.  Alcohol:      reports that she does not currently use alcohol. Drug Use:  reports that she does not currently use drugs. Family History:     Family History   Problem Relation Age of Onset    Other Mother     Diabetes Father     Heart Disease Father 27    Cancer Maternal Grandfather        Review of Systems:     Positive and Negative as described in HPI    Review of Systems   Constitutional: Negative. HENT: Negative. Eyes: Negative. Respiratory:  Positive for shortness of breath and wheezing. Gastrointestinal: Negative. Endocrine: Negative. Genitourinary: Negative. Musculoskeletal:  Positive for back pain. Skin: Negative. Allergic/Immunologic: Negative. Neurological: Negative. Hematological: Negative. Psychiatric/Behavioral: Negative.        Physical Exam:   Vitals:  /84   Pulse 81   Temp 98.6 °F (37 °C) (Temporal)   Resp 18   Wt 277 lb 6.4 oz (125.8 kg)   LMP 07/27/2020   SpO2 96%   BMI 54.18 kg/m²     Physical Exam  Vitals and nursing note reviewed. Constitutional:       General: She is not in acute distress. Appearance: Normal appearance. HENT:      Head: Normocephalic. Right Ear: External ear normal.      Left Ear: External ear normal.      Nose: Nose normal.      Mouth/Throat:      Mouth: Mucous membranes are moist.      Pharynx: Oropharynx is clear. Eyes:      Extraocular Movements: Extraocular movements intact. Conjunctiva/sclera: Conjunctivae normal.      Pupils: Pupils are equal, round, and reactive to light. Cardiovascular:      Rate and Rhythm: Normal rate and regular rhythm. Heart sounds: Normal heart sounds. Pulmonary:      Effort: Prolonged expiration present. Breath sounds: Examination of the right-lower field reveals decreased breath sounds. Examination of the left-lower field reveals decreased breath sounds. Decreased breath sounds present. No wheezing. Musculoskeletal:         General: Normal range of motion. Cervical back: Normal range of motion and neck supple. Skin:     General: Skin is warm. Capillary Refill: Capillary refill takes less than 2 seconds. Neurological:      General: No focal deficit present. Mental Status: She is alert and oriented to person, place, and time. Psychiatric:         Mood and Affect: Mood normal.         Behavior: Behavior normal.         Thought Content:  Thought content normal.         Judgment: Judgment normal.       Data:     Lab Results   Component Value Date/Time     06/10/2022 11:31 AM    K 4.5 06/10/2022 11:31 AM     06/10/2022 11:31 AM    CO2 24 06/10/2022 11:31 AM    BUN 11 06/10/2022 11:31 AM    CREATININE 0.74 06/10/2022 11:31 AM    GLUCOSE 89 06/10/2022 11:31 AM    PROT 7.3 03/21/2022 11:02 PM    LABALBU 4.1 03/21/2022 11:02 PM    BILITOT 0.16 03/21/2022 11:02 PM    ALKPHOS 109 03/21/2022 11:02 PM    AST 23 03/21/2022 11:02 PM    ALT 25 03/21/2022 11:02 PM     Lab Results   Component Value Date/Time    WBC 8.6 06/10/2022 11:31 AM    RBC 5.26 06/10/2022 11:31 AM    HGB 15.9 06/10/2022 11:31 AM    HCT 49.0 06/10/2022 11:31 AM    MCV 93.2 06/10/2022 11:31 AM    MCH 30.2 06/10/2022 11:31 AM    MCHC 32.4 06/10/2022 11:31 AM    RDW 14.7 06/10/2022 11:31 AM     06/10/2022 11:31 AM    MPV 9.5 06/10/2022 11:31 AM     Lab Results   Component Value Date/Time    TSH 1.71 06/10/2022 11:30 AM     Lab Results   Component Value Date/Time    CHOL 213 07/01/2022 12:10 PM    HDL 55 07/01/2022 12:10 PM    LABA1C 5.7 02/07/2023 04:44 PM       Assessment/Plan:      Diagnosis Orders   1. Chronic obstructive pulmonary disease, unspecified COPD type ([facility]  albuterol sulfate HFA (VENTOLIN HFA) 108 (90 Base) MCG/ACT inhaler    Jeannine Nayak MD, Pulmonology, [place]    DISCONTINUED: tiotropium (SPIRIVA HANDIHALER) 18 MCG inhalation capsule      2. Restrictive airway disease  albuterol sulfate HFA (VENTOLIN HFA) 108 (90 Base) MCG/ACT inhaler    Jeannine Nayak MD, Pulmonology, [place]    fluticasone-salmeterol (ADVAIR HFA) 230-21 MCG/ACT inhaler    DISCONTINUED: tiotropium (SPIRIVA HANDIHALER) 18 MCG inhalation capsule      3. Degenerative disc disease, lumbar  meloxicam (MOBIC) 15 MG tablet    baclofen (LIORESAL) 10 MG tablet      4. Type 2 diabetes mellitus without complication, without long-term current use of insulin ([facility])  POCT glycosylated hemoglobin (Hb A1C)    blood glucose monitor kit and supplies      5. Other screening mammogram  ROC ANNY DIGITAL SCREEN BILATERAL        Continues to complain of severe shortness of breath. Pulse ox 94 at start of rooming and patient states it was running 88 at her last pain management appointment. Discontinue Spiriva inhaler. Advair 2 puffs twice a day as prescribed. Referral to pulmonology for further evaluation and treatment recommendations. Continue with albuterol inhaler as needed. Continue with pain management follow-up.     Routine screenings ordered today and will call with results. 1.  Sil Bennett received counseling on the following healthy behaviors: nutrition, exercise, medication adherence, and tobacco cessation  2. Patient given educational materials - see patient instructions  3. Was a self-tracking handout given in paper form or via [employer]? No  If yes, see orders or list here. 4.  Discussed use, benefit, and side effects of prescribed medications. Barriers to medication compliance addressed. All patient questions answered. Pt voiced understanding. 5.  Reviewed prior labs and health maintenance  6. Continue current medications, diet and exercise. Completed Refills   Requested Prescriptions     Signed Prescriptions Disp Refills    meloxicam (MOBIC) 15 MG tablet 90 tablet 1     Sig: take 1 tablet by mouth once daily    baclofen (LIORESAL) 10 MG tablet 270 tablet 0     Sig: Take 1 tablet by mouth 3 times daily    albuterol sulfate HFA (VENTOLIN HFA) 108 (90 Base) MCG/ACT inhaler 54 g 3     Sig: Inhale 2 puffs into the lungs 4 times daily as needed for Shortness of Breath    fluticasone-salmeterol (ADVAIR HFA) 230-21 MCG/ACT inhaler 1 each 3     Sig: Inhale 2 puffs into the lungs 2 times daily    blood glucose monitor kit and supplies 1 kit 0     Sig: Dispense sufficient amount for indicated testing frequency plus additional to accommodate PRN testing needs. Dispense all needed supplies to include: monitor, strips, lancing device, lancets, control solutions, alcohol swabs. Return in about 1 month (around 3/7/2023).

## 2023-02-09 ASSESSMENT — ENCOUNTER SYMPTOMS
BACK PAIN: 1
EYES NEGATIVE: 1
SHORTNESS OF BREATH: 1
ALLERGIC/IMMUNOLOGIC NEGATIVE: 1
WHEEZING: 1
GASTROINTESTINAL NEGATIVE: 1

## 2023-02-24 RX ORDER — GLUCOSAMINE HCL/CHONDROITIN SU 500-400 MG
CAPSULE ORAL
Qty: 100 STRIP | Refills: 3 | Status: SHIPPED | OUTPATIENT
Start: 2023-02-24

## 2023-02-24 NOTE — TELEPHONE ENCOUNTER
Health Maintenance   Topic Date Due    Diabetic foot exam  Never done    Diabetic Alb to Cr ratio (uACR) test  Never done    Diabetic retinal exam  Never done    Cervical cancer screen  Never done    Breast cancer screen  Never done    Shingles vaccine (1 of 2) Never done    Low dose CT lung screening  Never done    Colorectal Cancer Screen  02/15/2023    COVID-19 Vaccine (3 - Booster for Pfizer series) 03/24/2023 (Originally 11/29/2021)    DTaP/Tdap/Td vaccine (1 - Tdap) 06/08/2023 (Originally 11/17/1991)    Pneumococcal 0-64 years Vaccine (1 - PCV) 06/08/2023 (Originally 11/17/1978)    Hepatitis C screen  06/08/2023 (Originally 11/17/1990)    HIV screen  06/08/2023 (Originally 11/17/1987)    Flu vaccine (1) 10/05/2023 (Originally 8/1/2022)    Hepatitis B vaccine (1 of 3 - Risk 3-dose series) 02/07/2024 (Originally 11/17/1991)    GFR test (Diabetes, CKD 3-4, OR last GFR 15-59)  06/10/2023    Lipids  07/01/2023    A1C test (Diabetic or Prediabetic)  02/07/2024    Depression Monitoring  02/07/2024    Hepatitis A vaccine  Aged Out    Hib vaccine  Aged Out    Meningococcal (ACWY) vaccine  Aged Out             (applicable per patient's age: Cancer Screenings, Depression Screening, Fall Risk Screening, Immunizations)    Hemoglobin A1C (%)   Date Value   02/07/2023 5.7   11/08/2022 5.9   07/01/2022 6.3 (H)     LDL Cholesterol (mg/dL)   Date Value   07/01/2022 110     AST (U/L)   Date Value   03/21/2022 23     ALT (U/L)   Date Value   03/21/2022 25     BUN (mg/dL)   Date Value   06/10/2022 11      (goal A1C is < 7)   (goal LDL is <100) need 30-50% reduction from baseline     BP Readings from Last 3 Encounters:   02/07/23 122/84   11/08/22 118/74   10/05/22 122/80    (goal /80)      All Future Testing planned in [facility]:  Lab Frequency Next Occurrence   ROC ANNY DIGITAL SCREEN BILATERAL Once 02/07/2023       Next Visit Date:  Future Appointments   Date Time Provider Pao Elias   [facility]2023  3[facility] REBEKAH - CNP [facility]   3/27/2023  9:30 AM [facility] Washburn [facility]            Patient Active Problem List:     Major depressive disorder, recurrent episode with anxious distress ([facility]

## 2023-03-07 ENCOUNTER — OFFICE VISIT (OUTPATIENT)
Dept: PRIMARY CARE CLINIC | Age: 53
End: 2023-03-07
Payer: MEDICAID

## 2023-03-07 VITALS
WEIGHT: 274.8 LBS | RESPIRATION RATE: 18 BRPM | HEART RATE: 89 BPM | DIASTOLIC BLOOD PRESSURE: 84 MMHG | TEMPERATURE: 97.7 F | BODY MASS INDEX: 53.67 KG/M2 | SYSTOLIC BLOOD PRESSURE: 120 MMHG | OXYGEN SATURATION: 92 %

## 2023-03-07 DIAGNOSIS — J44.9 CHRONIC OBSTRUCTIVE PULMONARY DISEASE, UNSPECIFIED COPD TYPE (HCC): Primary | ICD-10-CM

## 2023-03-07 DIAGNOSIS — F17.200 SMOKING ADDICTION: ICD-10-CM

## 2023-03-07 DIAGNOSIS — F41.9 ANXIETY: ICD-10-CM

## 2023-03-07 DIAGNOSIS — Z12.11 COLON CANCER SCREENING: ICD-10-CM

## 2023-03-07 DIAGNOSIS — F31.9 BIPOLAR DEPRESSION (HCC): ICD-10-CM

## 2023-03-07 DIAGNOSIS — J44.1 COPD WITH ACUTE EXACERBATION (HCC): ICD-10-CM

## 2023-03-07 PROCEDURE — 99214 OFFICE O/P EST MOD 30 MIN: CPT | Performed by: NURSE PRACTITIONER

## 2023-03-07 RX ORDER — ARIPIPRAZOLE 20 MG/1
20 TABLET ORAL DAILY
Qty: 90 TABLET | Refills: 3 | Status: SHIPPED | OUTPATIENT
Start: 2023-03-07 | End: 2023-06-05

## 2023-03-07 RX ORDER — AZITHROMYCIN 250 MG/1
250 TABLET, FILM COATED ORAL SEE ADMIN INSTRUCTIONS
Qty: 6 TABLET | Refills: 0 | Status: SHIPPED | OUTPATIENT
Start: 2023-03-07 | End: 2023-03-12

## 2023-03-07 RX ORDER — HYDROXYZINE HYDROCHLORIDE 25 MG/1
25 TABLET, FILM COATED ORAL NIGHTLY
Qty: 30 TABLET | Refills: 0 | Status: SHIPPED | OUTPATIENT
Start: 2023-03-07 | End: 2023-04-06

## 2023-03-07 RX ORDER — VARENICLINE TARTRATE 0.5 MG/1
.5-1 TABLET, FILM COATED ORAL SEE ADMIN INSTRUCTIONS
Qty: 57 TABLET | Refills: 0 | Status: SHIPPED | OUTPATIENT
Start: 2023-03-07

## 2023-03-07 RX ORDER — PREDNISONE 20 MG/1
20 TABLET ORAL 2 TIMES DAILY
Qty: 10 TABLET | Refills: 0 | Status: SHIPPED | OUTPATIENT
Start: 2023-03-07 | End: 2023-03-12

## 2023-03-07 ASSESSMENT — PATIENT HEALTH QUESTIONNAIRE - PHQ9
4. FEELING TIRED OR HAVING LITTLE ENERGY: 0
SUM OF ALL RESPONSES TO PHQ QUESTIONS 1-9: 0
SUM OF ALL RESPONSES TO PHQ QUESTIONS 1-9: 0
1. LITTLE INTEREST OR PLEASURE IN DOING THINGS: 0
6. FEELING BAD ABOUT YOURSELF - OR THAT YOU ARE A FAILURE OR HAVE LET YOURSELF OR YOUR FAMILY DOWN: 0
SUM OF ALL RESPONSES TO PHQ9 QUESTIONS 1 & 2: 0
9. THOUGHTS THAT YOU WOULD BE BETTER OFF DEAD, OR OF HURTING YOURSELF: 0
10. IF YOU CHECKED OFF ANY PROBLEMS, HOW DIFFICULT HAVE THESE PROBLEMS MADE IT FOR YOU TO DO YOUR WORK, TAKE CARE OF THINGS AT HOME, OR GET ALONG WITH OTHER PEOPLE: 0
2. FEELING DOWN, DEPRESSED OR HOPELESS: 0
5. POOR APPETITE OR OVEREATING: 0
SUM OF ALL RESPONSES TO PHQ QUESTIONS 1-9: 0
3. TROUBLE FALLING OR STAYING ASLEEP: 0
7. TROUBLE CONCENTRATING ON THINGS, SUCH AS READING THE NEWSPAPER OR WATCHING TELEVISION: 0
8. MOVING OR SPEAKING SO SLOWLY THAT OTHER PEOPLE COULD HAVE NOTICED. OR THE OPPOSITE, BEING SO FIGETY OR RESTLESS THAT YOU HAVE BEEN MOVING AROUND A LOT MORE THAN USUAL: 0
SUM OF ALL RESPONSES TO PHQ QUESTIONS 1-9: 0

## 2023-03-07 ASSESSMENT — ENCOUNTER SYMPTOMS
WHEEZING: 1
EYES NEGATIVE: 1
COUGH: 1
SHORTNESS OF BREATH: 1
GASTROINTESTINAL NEGATIVE: 1
CHEST TIGHTNESS: 1

## 2023-03-07 NOTE — PATIENT INSTRUCTIONS
SURVEY:     You may be receiving a survey from [employer] regarding your visit today. Please complete the survey to enable us to provide the highest quality of care to you and your family. If you cannot score us a very good on any question, please call the office to discuss how we could have made your experience a very good one.      Thank you,    Shannon Rothman, APRN-CNP  Lauren Duncan, APRN-CNP  Bebe Davis, GAIL Banks, YOLANDA Shaw, YOLANDA Botello, YOLANDA Gary, PCA  Nithya, PM

## 2023-03-07 NOTE — PROGRESS NOTES
[facility] PHYSICIANS  Antwon Schaefer, [address] [facility] PRIMARY CARE  [address] [facility] [address] [place] [address]  Dept: [phone]  Dept Fax: [phone]      Name: Dwayne Alba  : 1970         Chief Complaint:     Chief Complaint   Patient presents with    COPD     1 month check. Cough     X 1 day. Shortness of Breath     X 1 day. History of Present Illness:      Dwayne Alba is a 50 y.o.  female who presents with COPD (1 month check. ), Cough (X 1 day. ), and Shortness of Breath (X 1 day. )      HPI  Eric Henriquez is here today for a routine office visit. She was started on Advair last month. She states she has not noticed any difference from using the [address]. She is still using her Albuterol frequently. She states she does continue to have increased shortness of breath with activity. She does continue to smoke. She was prescribed chantix in November and does continue to smoke. She states she has decreased to 2 cigarettes a day but until today she was smoking more due to increased stress. She states she was smoking 2 packs a day. She states she is motivated to quit. She has her Pulmonology appointment scheduled for 3/27/23. She woke up today with cough and congestion. She has been wheezing more with chest congestion. She is having a productive cough. She is using her Albuterol more frequently. Kimmie Funez is wanting her Abilify increased. She states she has been having increased depression. She states she normally has highs and lows but has just been having lows lately. She states she has been having increased stressors also in her life. She states she isn't sleeping at night and requesting hydroxyzine. She states she has used that in the past for increased anxiety and it worked well for her.     Past Medical History:     Past Medical History:   Diagnosis Date    Anxiety     Depression     Family history of thyroid problem     Hypertension     Obesity     Osteoarthritis Type 2 diabetes mellitus without complication (HCC)       Reviewed all health maintenance requirements and ordered appropriate tests  Health Maintenance Due   Topic Date Due    Diabetic foot exam  Never done    Diabetic Alb to Cr ratio (uACR) test  Never done    Diabetic retinal exam  Never done    Cervical cancer screen  Never done    Breast cancer screen  Never done    Low dose CT lung screening  Never done    Colorectal Cancer Screen  02/15/2023       Past Surgical History:     Past Surgical History:   Procedure Laterality Date    CHOLECYSTECTOMY          Medications:       Prior to Admission medications    Medication Sig Start Date End Date Taking? Authorizing Provider   varenicline (CHANTIX) 0.5 MG tablet Take 1-2 tablets by mouth See Admin Instructions 0.5mg DAILY for 3 days followed by 0.5mg TWICE DAILY for 4 days followed by 1mg TWICE DAILY 3/7/23  Yes REBEKAH Fishman CNP   predniSONE (DELTASONE) 20 MG tablet Take 1 tablet by mouth 2 times daily for 5 days 3/7/23 3/12/23 Yes REBEKAH Fishman CNP   azithromycin (ZITHROMAX) 250 MG tablet Take 1 tablet by mouth See Admin Instructions for 5 days 500mg on day 1 followed by 250mg on days 2 - 5 3/7/23 3/12/23 Yes REBEKAH Fishman CNP   mometasone-formoterol [facility]) 100-5 MCG/ACT inhaler Inhale 2 puffs into the lungs 2 times daily 3/7/23 4/6/23 Yes REBEKAH Fishman CNP   ARIPiprazole (ABILIFY) 20 MG tablet Take 1 tablet by mouth daily 3/7/23 6/5/23 Yes REBEKAH Fishman CNP   hydrOXYzine HCl (ATARAX) 25 MG tablet Take 1 tablet by mouth nightly 3/7/23 4/6/23 Yes REBEKAH Fishman CNP   blood glucose monitor strips Test 4 times a day & as needed for symptoms of irregular blood glucose. Dispense sufficient amount for indicated testing frequency plus additional to accommodate PRN testing needs.  2/24/23  Yes REBEKAH Paul CNP   meloxicam (MOBIC) 15 MG tablet take 1 tablet by mouth once daily 2/7/23  Yes Kathie PATHAK REBEKAH Jenkins CNP   baclofen (LIORESAL) 10 MG tablet Take 1 tablet by mouth 3 times daily 2/7/23  Yes REBEKAH Bustamante CNP   albuterol sulfate HFA (VENTOLIN HFA) 108 (90 Base) MCG/ACT inhaler Inhale 2 puffs into the lungs 4 times daily as needed for Shortness of Breath 2/7/23 3/9/23 Yes REBEKAH Bustamante CNP   blood glucose monitor kit and supplies Dispense sufficient amount for indicated testing frequency plus additional to accommodate PRN testing needs. Dispense all needed supplies to include: monitor, strips, lancing device, lancets, control solutions, alcohol swabs. 2/7/23  Yes REBEKAH Bustamante CNP   metFORMIN (GLUCOPHAGE) 500 MG tablet take 1 tablet by mouth twice a day with meals 1/27/23  Yes REBEKAH Corcoran CNP   albuterol (PROVENTIL) (2.5 MG/3ML) 0.083% nebulizer solution inhale contents of 1 vial ( 3 milliliters ) in nebulizer by mouth and INTO THE LUNGS every 4 hours if needed for wheezing or shortness of breath 11/2/22  Yes REBEKAH Corcoran CNP   spironolactone (ALDACTONE) 25 MG tablet take 1 tablet by mouth once daily 10/6/22  Yes REBEKAH Bustamante CNP   gabapentin (NEURONTIN) 300 MG capsule Take 1 capsule by mouth in the morning and 1 capsule at noon and 1 capsule before bedtime. Do all this for 30 days. Intended supply: 90 days. 8/11/22 3/7/23 Yes Gold Silva MD   carvedilol (COREG) 6.25 MG tablet Take 1 tablet by mouth 2 times daily 7/1/22  Yes Mary Anne Narayan PA-C   Elastic Bandages & Supports (MEDICAL COMPRESSION STOCKINGS) [address] 1 each by Does not apply route daily as needed (Wear Daily) 6/8/22 3/7/23 Yes REBEKAH Bustamante CNP   acetaminophen (TYLENOL) 500 MG tablet Take 500 mg by mouth every 6 hours as needed for Pain   Yes Historical Provider, MD   fenofibrate (TRIGLIDE) 160 MG tablet Take 1 tablet by mouth daily  Patient not taking: No sig reported 7/14/22   Sophia Organ, APRN - CNP        Allergies:        Other    Social History: Tobacco:    reports that she has been smoking cigarettes. She has a 28.00 pack-year smoking history. She has never used smokeless tobacco.  Alcohol:      reports that she does not currently use alcohol. Drug Use:  reports that she does not currently use drugs. Family History:     Family History   Problem Relation Age of Onset    Other Mother     Diabetes Father     Heart Disease Father 27    Cancer Maternal Grandfather        Review of Systems:     Positive and Negative as described in HPI    Review of Systems   Constitutional: Negative. HENT: Negative. Eyes: Negative. Respiratory:  Positive for cough, chest tightness, shortness of breath and wheezing. Cardiovascular: Negative. Gastrointestinal: Negative. Endocrine: Negative. Genitourinary: Negative. Musculoskeletal: Negative. Skin: Negative. Allergic/Immunologic: Positive for environmental allergies. Neurological: Negative. Hematological: Negative. Psychiatric/Behavioral:  Positive for dysphoric mood and sleep disturbance. The patient is nervous/anxious. Physical Exam:   Vitals:  /84   Pulse 89   Temp 97.7 °F (36.5 °C) (Temporal)   Resp 18   Wt 274 lb 12.8 oz (124.6 kg)   LMP 07/27/2020   SpO2 92%   BMI 53.67 kg/m²     Physical Exam  Vitals and nursing note reviewed. Constitutional:       General: She is not in acute distress. Appearance: Normal appearance. She is obese. HENT:      Head: Normocephalic. Right Ear: Tympanic membrane normal.      Left Ear: Tympanic membrane normal.      Nose: Nose normal.      Mouth/Throat:      Mouth: Mucous membranes are moist.      Pharynx: Oropharynx is clear. Eyes:      Conjunctiva/sclera: Conjunctivae normal.      Pupils: Pupils are equal, round, and reactive to light. Cardiovascular:      Rate and Rhythm: Regular rhythm. Pulses: Normal pulses. Heart sounds: Normal heart sounds. Pulmonary:      Effort: Prolonged expiration present. Breath sounds: Decreased air movement present. Examination of the right-upper field reveals wheezing. Examination of the left-upper field reveals wheezing. Examination of the right-lower field reveals wheezing. Examination of the left-lower field reveals wheezing. Wheezing (scattered expiratory wheezes) present. Musculoskeletal:         General: Normal range of motion. Cervical back: Normal range of motion and neck supple. Skin:     General: Skin is warm. Capillary Refill: Capillary refill takes less than 2 seconds. Neurological:      General: No focal deficit present. Mental Status: She is alert and oriented to person, place, and time. Psychiatric:         Mood and Affect: Mood normal.         Behavior: Behavior normal.         Thought Content: Thought content normal.       Data:     Lab Results   Component Value Date/Time     06/10/2022 11:31 AM    K 4.5 06/10/2022 11:31 AM     06/10/2022 11:31 AM    CO2 24 06/10/2022 11:31 AM    BUN 11 06/10/2022 11:31 AM    CREATININE 0.74 06/10/2022 11:31 AM    GLUCOSE 89 06/10/2022 11:31 AM    PROT 7.3 03/21/2022 11:02 PM    LABALBU 4.1 03/21/2022 11:02 PM    BILITOT 0.16 03/21/2022 11:02 PM    ALKPHOS 109 03/21/2022 11:02 PM    AST 23 03/21/2022 11:02 PM    ALT 25 03/21/2022 11:02 PM     Lab Results   Component Value Date/Time    WBC 8.6 06/10/2022 11:31 AM    RBC 5.26 06/10/2022 11:31 AM    HGB 15.9 06/10/2022 11:31 AM    HCT 49.0 06/10/2022 11:31 AM    MCV 93.2 06/10/2022 11:31 AM    MCH 30.2 06/10/2022 11:31 AM    MCHC 32.4 06/10/2022 11:31 AM    RDW 14.7 06/10/2022 11:31 AM     06/10/2022 11:31 AM    MPV 9.5 06/10/2022 11:31 AM     Lab Results   Component Value Date/Time    TSH 1.71 06/10/2022 11:30 AM     Lab Results   Component Value Date/Time    CHOL 213 07/01/2022 12:10 PM    HDL 55 07/01/2022 12:10 PM    LABA1C 5.7 02/07/2023 04:44 PM       Assessment/Plan:      Diagnosis Orders   1.  Chronic obstructive pulmonary disease, unspecified COPD type ([facility]  mometasone-formoterol (DULERA) 100-5 MCG/ACT inhaler      2. COPD with acute exacerbation (HCC)  predniSONE (DELTASONE) 20 MG tablet    azithromycin (ZITHROMAX) 250 MG tablet      3. Bipolar depression (HCC)  ARIPiprazole (ABILIFY) 20 MG tablet      4. Anxiety  hydrOXYzine HCl (ATARAX) 25 MG tablet      5. Smoking addiction  varenicline (CHANTIX) 0.5 MG tablet      6. Colon cancer screening  Fecal DNA [facility]        Prednisone and Zithromax as  prescribed. Discontinue Advair. Start Dulera 2 puffs twice a day. Continue albuterol as needed for rescue inhaler. Encouraged to continue smoking cessation and Chantix rhe prescribed today. Abilify increased to 20 mg tablets daily. Hydroxyzine 25 mg for increased anxiety. We will continue to closely monitor. Prventative screenings ordered today. Advised patient to quit and offered support. Prescribed varenicline. Potential side effects and safe use reviewed. 1.  Pamela Moyer received counseling on the following healthy behaviors: nutrition, exercise, medication adherence, and tobacco cessation  2. Patient given educational materials - see patient instructions  3. Was a self-tracking handout given in paper form or via [employer]? No  If yes, see orders or list here. 4.  Discussed use, benefit, and side effects of prescribed medications. Barriers to medication compliance addressed. All patient questions answered. Pt voiced understanding. 5.  Reviewed prior labs and health maintenance  6. Continue current medications, diet and exercise.     Completed Refills   Requested Prescriptions     Signed Prescriptions Disp Refills    varenicline (CHANTIX) 0.5 MG tablet 57 tablet 0     Sig: Take 1-2 tablets by mouth See Admin Instructions 0.5mg DAILY for 3 days followed by 0.5mg TWICE DAILY for 4 days followed by 1mg TWICE DAILY    predniSONE (DELTASONE) 20 MG tablet 10 tablet 0     Sig: Take 1 tablet by mouth 2 times daily for 5 days    azithromycin (ZITHROMAX) 250 MG tablet 6 tablet 0     Sig: Take 1 tablet by mouth See Admin Instructions for 5 days 500mg on day 1 followed by 250mg on days 2 - 5    mometasone-formoterol (DULERA) 100-5 MCG/ACT inhaler 1 each 3     Sig: Inhale 2 puffs into the lungs 2 times daily    ARIPiprazole (ABILIFY) 20 MG tablet 90 tablet 3     Sig: Take 1 tablet by mouth daily    hydrOXYzine HCl (ATARAX) 25 MG tablet 30 tablet 0     Sig: Take 1 tablet by mouth nightly         Return in about 1 month (around 4/7/2023).

## 2023-03-27 ENCOUNTER — OFFICE VISIT (OUTPATIENT)
Dept: PULMONOLOGY | Age: 53
End: 2023-03-27

## 2023-03-27 VITALS
DIASTOLIC BLOOD PRESSURE: 89 MMHG | RESPIRATION RATE: 20 BRPM | HEART RATE: 84 BPM | HEIGHT: 60 IN | TEMPERATURE: 97.6 F | WEIGHT: 269.9 LBS | SYSTOLIC BLOOD PRESSURE: 131 MMHG | BODY MASS INDEX: 52.99 KG/M2 | OXYGEN SATURATION: 91 %

## 2023-03-27 DIAGNOSIS — G47.33 OSA (OBSTRUCTIVE SLEEP APNEA): ICD-10-CM

## 2023-03-27 DIAGNOSIS — Z87.891 PERSONAL HISTORY OF TOBACCO USE: ICD-10-CM

## 2023-03-27 DIAGNOSIS — E66.01 MORBID OBESITY WITH BMI OF 50.0-59.9, ADULT (HCC): ICD-10-CM

## 2023-03-27 DIAGNOSIS — J41.8 MIXED SIMPLE AND MUCOPURULENT CHRONIC BRONCHITIS (HCC): Primary | ICD-10-CM

## 2023-03-27 ASSESSMENT — SLEEP AND FATIGUE QUESTIONNAIRES
HOW LIKELY ARE YOU TO NOD OFF OR FALL ASLEEP WHEN YOU ARE A PASSENGER IN A CAR FOR AN HOUR WITHOUT A BREAK: 2
HOW LIKELY ARE YOU TO NOD OFF OR FALL ASLEEP WHILE SITTING QUIETLY AFTER LUNCH WITHOUT ALCOHOL: 2
HOW LIKELY ARE YOU TO NOD OFF OR FALL ASLEEP WHILE LYING DOWN TO REST IN THE AFTERNOON WHEN CIRCUMSTANCES PERMIT: 3
ESS TOTAL SCORE: 14
HOW LIKELY ARE YOU TO NOD OFF OR FALL ASLEEP WHILE SITTING AND READING: 3
HOW LIKELY ARE YOU TO NOD OFF OR FALL ASLEEP WHILE SITTING AND TALKING TO SOMEONE: 0
HOW LIKELY ARE YOU TO NOD OFF OR FALL ASLEEP IN A CAR, WHILE STOPPED FOR A FEW MINUTES IN TRAFFIC: 0
HOW LIKELY ARE YOU TO NOD OFF OR FALL ASLEEP WHILE SITTING INACTIVE IN A PUBLIC PLACE: 1
HOW LIKELY ARE YOU TO NOD OFF OR FALL ASLEEP WHILE WATCHING TV: 3

## 2023-03-28 ENCOUNTER — TELEPHONE (OUTPATIENT)
Dept: PULMONOLOGY | Age: 53
End: 2023-03-28

## 2023-03-28 DIAGNOSIS — J41.8 MIXED SIMPLE AND MUCOPURULENT CHRONIC BRONCHITIS (HCC): Primary | ICD-10-CM

## 2023-03-28 NOTE — TELEPHONE ENCOUNTER
Insurance indicated it will not cover Kelsie Lowe but will cover but will cover Advair, Flovent and Pulmicort. Please advise.

## 2023-04-05 ENCOUNTER — OFFICE VISIT (OUTPATIENT)
Dept: PRIMARY CARE CLINIC | Age: 53
End: 2023-04-05
Payer: MEDICAID

## 2023-04-05 VITALS
DIASTOLIC BLOOD PRESSURE: 78 MMHG | RESPIRATION RATE: 16 BRPM | HEART RATE: 84 BPM | WEIGHT: 268 LBS | TEMPERATURE: 98.9 F | BODY MASS INDEX: 52.34 KG/M2 | SYSTOLIC BLOOD PRESSURE: 122 MMHG | OXYGEN SATURATION: 93 %

## 2023-04-05 DIAGNOSIS — F41.9 ANXIETY: ICD-10-CM

## 2023-04-05 DIAGNOSIS — F17.200 SMOKING ADDICTION: ICD-10-CM

## 2023-04-05 DIAGNOSIS — J41.1 MUCOPURULENT CHRONIC BRONCHITIS (HCC): Primary | ICD-10-CM

## 2023-04-05 PROCEDURE — 99214 OFFICE O/P EST MOD 30 MIN: CPT | Performed by: NURSE PRACTITIONER

## 2023-04-05 RX ORDER — VARENICLINE TARTRATE 1 MG/1
1 TABLET, FILM COATED ORAL 2 TIMES DAILY
Qty: 154 TABLET | Refills: 0 | Status: SHIPPED | OUTPATIENT
Start: 2023-04-05 | End: 2023-06-21

## 2023-04-05 RX ORDER — HYDROXYZINE HYDROCHLORIDE 25 MG/1
25 TABLET, FILM COATED ORAL NIGHTLY
Qty: 90 TABLET | Refills: 3 | Status: SHIPPED | OUTPATIENT
Start: 2023-04-05 | End: 2023-07-04

## 2023-04-05 ASSESSMENT — PATIENT HEALTH QUESTIONNAIRE - PHQ9
2. FEELING DOWN, DEPRESSED OR HOPELESS: 0
1. LITTLE INTEREST OR PLEASURE IN DOING THINGS: 0
9. THOUGHTS THAT YOU WOULD BE BETTER OFF DEAD, OR OF HURTING YOURSELF: 0
8. MOVING OR SPEAKING SO SLOWLY THAT OTHER PEOPLE COULD HAVE NOTICED. OR THE OPPOSITE, BEING SO FIGETY OR RESTLESS THAT YOU HAVE BEEN MOVING AROUND A LOT MORE THAN USUAL: 0
6. FEELING BAD ABOUT YOURSELF - OR THAT YOU ARE A FAILURE OR HAVE LET YOURSELF OR YOUR FAMILY DOWN: 0
3. TROUBLE FALLING OR STAYING ASLEEP: 0
SUM OF ALL RESPONSES TO PHQ QUESTIONS 1-9: 0
7. TROUBLE CONCENTRATING ON THINGS, SUCH AS READING THE NEWSPAPER OR WATCHING TELEVISION: 0
10. IF YOU CHECKED OFF ANY PROBLEMS, HOW DIFFICULT HAVE THESE PROBLEMS MADE IT FOR YOU TO DO YOUR WORK, TAKE CARE OF THINGS AT HOME, OR GET ALONG WITH OTHER PEOPLE: 0
5. POOR APPETITE OR OVEREATING: 0
4. FEELING TIRED OR HAVING LITTLE ENERGY: 0
SUM OF ALL RESPONSES TO PHQ QUESTIONS 1-9: 0
SUM OF ALL RESPONSES TO PHQ9 QUESTIONS 1 & 2: 0

## 2023-04-05 ASSESSMENT — ENCOUNTER SYMPTOMS
COUGH: 1
GASTROINTESTINAL NEGATIVE: 1
EYES NEGATIVE: 1
ALLERGIC/IMMUNOLOGIC NEGATIVE: 1
SHORTNESS OF BREATH: 1
WHEEZING: 1

## 2023-04-05 NOTE — PATIENT INSTRUCTIONS
SURVEY:     You may be receiving a survey from [employer] regarding your visit today. Please complete the survey to enable us to provide the highest quality of care to you and your family. If you cannot score us a very good on any question, please call the office to discuss how we could have made your experience a very good one.      Thank you,    Dorcas Rothman, APRN-CNP  Jj Vincent, APRN-CNP  Cesario Gomez, GAIL Low, YOLANDA Bermudez, YOLANDA Botello, CMA  Cookie, PCA  Nithya, PM

## 2023-04-05 NOTE — PROGRESS NOTES
today.    She has a in house sleep study scheduled. Past Medical History:     Past Medical History:   Diagnosis Date    Anxiety     Depression     Family history of thyroid problem     Hypertension     Obesity     Osteoarthritis     Type 2 diabetes mellitus without complication ([facility] [address]       Reviewed all health maintenance requirements and ordered appropriate tests  Health Maintenance Due   Topic Date Due    Diabetic foot exam  Never done    Diabetic Alb to Cr ratio (uACR) test  Never done    Diabetic retinal exam  Never done    Cervical cancer screen  Never done    COVID-19 Vaccine (5 - Booster) 11/29/2021    Breast cancer screen  Never done    Low dose CT lung screening  Never done    Colorectal Cancer Screen  02/15/2023       Past Surgical History:     Past Surgical History:   Procedure Laterality Date    CHOLECYSTECTOMY          Medications:       Prior to Admission medications    Medication Sig Start Date End Date Taking? Authorizing Provider   varenicline (CHANTIX) 1 MG tablet Take 1 tablet by mouth 2 times daily 4/5/23 6/21/23 Yes REBEKAH Conte CNP   hydrOXYzine HCl (ATARAX) 25 MG tablet Take 1 tablet by mouth nightly 4/5/23 7/4/23 Yes REBEKAH Conte CNP   fluticasone-salmeterol (ADVAIR HFA) 115-21 MCG/ACT inhaler Inhale 2 puffs into the lungs 2 times daily 3/28/23 6/26/23 Yes Tori Noriega MD   mometasone-formoterol (DULERA) 200-5 MCG/ACT inhaler Inhale 2 puffs into the lungs in the morning and 2 puffs in the evening. 3/27/23 4/26/23 Yes Tori Noriega MD   ARIPiprazole (ABILIFY) 20 MG tablet Take 1 tablet by mouth daily 3/7/23 6/5/23 Yes REBEKAH Conte CNP   blood glucose monitor strips Test 4 times a day & as needed for symptoms of irregular blood glucose. Dispense sufficient amount for indicated testing frequency plus additional to accommodate PRN testing needs.  2/24/23  Yes REBEKAH Reyes CNP   meloxicam (MOBIC) 15 MG tablet take 1 tablet by mouth once daily 2/7/23

## 2023-04-21 ENCOUNTER — TELEPHONE (OUTPATIENT)
Dept: PRIMARY CARE CLINIC | Age: 53
End: 2023-04-21

## 2023-04-27 RX ORDER — ARIPIPRAZOLE 15 MG/1
TABLET ORAL
Qty: 90 TABLET | Refills: 1 | OUTPATIENT
Start: 2023-04-27

## 2023-04-30 PROBLEM — E66.01 MORBID OBESITY WITH BMI OF 50.0-59.9, ADULT (HCC): Status: ACTIVE | Noted: 2023-04-30

## 2023-04-30 PROBLEM — J41.8 MIXED SIMPLE AND MUCOPURULENT CHRONIC BRONCHITIS (HCC): Status: ACTIVE | Noted: 2023-04-30

## 2023-04-30 PROBLEM — Z87.891 PERSONAL HISTORY OF TOBACCO USE: Status: ACTIVE | Noted: 2023-04-30

## 2023-05-08 DIAGNOSIS — M51.36 DEGENERATIVE DISC DISEASE, LUMBAR: ICD-10-CM

## 2023-05-08 DIAGNOSIS — J41.8 MIXED SIMPLE AND MUCOPURULENT CHRONIC BRONCHITIS (HCC): ICD-10-CM

## 2023-05-08 DIAGNOSIS — J98.4 RESTRICTIVE AIRWAY DISEASE: ICD-10-CM

## 2023-05-08 DIAGNOSIS — J44.9 CHRONIC OBSTRUCTIVE PULMONARY DISEASE, UNSPECIFIED COPD TYPE (HCC): ICD-10-CM

## 2023-05-08 RX ORDER — TIOTROPIUM BROMIDE 18 UG/1
18 CAPSULE ORAL; RESPIRATORY (INHALATION) DAILY
Qty: 90 CAPSULE | Refills: 1 | OUTPATIENT
Start: 2023-05-08

## 2023-05-08 RX ORDER — BACLOFEN 10 MG/1
10 TABLET ORAL 3 TIMES DAILY
Qty: 270 TABLET | Refills: 0 | Status: SHIPPED | OUTPATIENT
Start: 2023-05-08

## 2023-05-08 NOTE — TELEPHONE ENCOUNTER
Health Maintenance   Topic Date Due    Diabetic foot exam  Never done    Diabetic Alb to Cr ratio (uACR) test  Never done    Diabetic retinal exam  Never done    Cervical cancer screen  Never done    COVID-19 Vaccine (5 - Booster) 11/29/2021    Breast cancer screen  Never done    Low dose CT lung screening  Never done    Colorectal Cancer Screen  02/15/2023    DTaP/Tdap/Td vaccine (1 - Tdap) 06/08/2023 (Originally 11/17/1991)    Pneumococcal 0-64 years Vaccine (1 - PCV) 06/08/2023 (Originally 11/17/1978)    Hepatitis C screen  06/08/2023 (Originally 11/17/1990)    HIV screen  06/08/2023 (Originally 11/17/1987)    Flu vaccine (Season Ended) 10/05/2023 (Originally 8/1/2023)    Hepatitis B vaccine (1 of 3 - Risk 3-dose series) 02/07/2024 (Originally 11/17/1991)    Shingles vaccine (1 of 2) 03/07/2024 (Originally 11/17/2022)    GFR test (Diabetes, CKD 3-4, OR last GFR 15-59)  06/10/2023    Lipids  07/01/2023    A1C test (Diabetic or Prediabetic)  02/07/2024    Depression Monitoring  04/05/2024    Hepatitis A vaccine  Aged Out    Hib vaccine  Aged Out    Meningococcal (ACWY) vaccine  Aged Out    Diabetes screen  Discontinued             (applicable per patient's age: Cancer Screenings, Depression Screening, Fall Risk Screening, Immunizations)    Hemoglobin A1C (%)   Date Value   02/07/2023 5.7   11/08/2022 5.9   07/01/2022 6.3 (H)     LDL Cholesterol (mg/dL)   Date Value   07/01/2022 110     AST (U/L)   Date Value   03/21/2022 23     ALT (U/L)   Date Value   03/21/2022 25     BUN (mg/dL)   Date Value   06/10/2022 11      (goal A1C is < 7)   (goal LDL is <100) need 30-50% reduction from baseline     BP Readings from Last 3 Encounters:   04/05/23 122/78   03/27/23 131/89   03/07/23 120/84    (goal /80)      All Future Testing planned in [facility]:  Lab Frequency Next Occurrence   ROC ANNY DIGITAL SCREEN BILATERAL Once 02/07/2023   Baseline Diagnostic Sleep Study Once 03/27/2023   Sleep Study with PAP Titration Once

## 2023-05-21 ENCOUNTER — HOSPITAL ENCOUNTER (OUTPATIENT)
Dept: SLEEP CENTER | Age: 53
Discharge: HOME | End: 2023-05-21
Payer: COMMERCIAL

## 2023-05-21 VITALS — BODY MASS INDEX: 50.26 KG/M2 | WEIGHT: 256 LBS | HEIGHT: 60 IN

## 2023-05-21 DIAGNOSIS — G47.33 OSA (OBSTRUCTIVE SLEEP APNEA): ICD-10-CM

## 2023-05-21 PROCEDURE — 95810 POLYSOM 6/> YRS 4/> PARAM: CPT

## 2023-05-21 ASSESSMENT — SLEEP AND FATIGUE QUESTIONNAIRES
DO YOU HAVE HIGH BLOOD PRESSURE: YES
FOR THE FIRST 30 MINUTES AFTER WAKING, I AM: SOMEWHAT DROWSY
I SLEEP WELL: NO
NORMAL AMOUNT OF SLEEP PER NIGHT: 6.5
DOES YOUR SNORING BOTHER OTHERS: YES
HOW MANY NAPS DO YOU TAKE PER WEEK: 7
HOW LIKELY ARE YOU TO NOD OFF OR FALL ASLEEP WHILE SITTING AND READING: 3
HAS ANYONE NOTICED THAT YOU QUIT BREATHING DURING SLEEP: ALMOST DAILY
FUNCTION BEST IN: MORNING
HOW LIKELY ARE YOU TO NOD OFF OR FALL ASLEEP WHILE SITTING INACTIVE IN A PUBLIC PLACE: 2
NUMBER OF TIMES YOU WAKE PER NIGHT: 4
ESS TOTAL SCORE: 18
WHAT TIME DO YOU USUALLY WAKE UP: 18000
USUAL AMOUNT OF TIME TO FALL ASLEEP (MIN): 30
DO YOU SNORE: YES
HOW LIKELY ARE YOU TO NOD OFF OR FALL ASLEEP WHEN YOU ARE A PASSENGER IN A CAR FOR AN HOUR WITHOUT A BREAK: 3
HOW OFTEN DO YOU SNORE: ALMOST DAILY
ARE YOU TIRED DURING WAKE TIME: ALMOST DAILY
HOW LIKELY ARE YOU TO NOD OFF OR FALL ASLEEP IN A CAR, WHILE STOPPED FOR A FEW MINUTES IN TRAFFIC: 0
HOW LIKELY ARE YOU TO NOD OFF OR FALL ASLEEP WHILE WATCHING TV: 3
WHAT TIME DO YOU USUALLY GO TO BED: 72000
SNORING VOLUME: AS LOUD AS TALKING
HOW LIKELY ARE YOU TO NOD OFF OR FALL ASLEEP WHILE SITTING QUIETLY AFTER LUNCH WITHOUT ALCOHOL: 3
HOW LIKELY ARE YOU TO NOD OFF OR FALL ASLEEP WHILE LYING DOWN TO REST IN THE AFTERNOON WHEN CIRCUMSTANCES PERMIT: 3
HAVE YOU EVER NODDED OFF OR FALLEN ASLEEP WHILE DRIVING: NO
ARE YOU TIRED AFTER SLEEPING: ALMOST DAILY
HOW LIKELY ARE YOU TO NOD OFF OR FALL ASLEEP WHILE SITTING AND TALKING TO SOMEONE: 1

## 2023-05-23 LAB — STATUS: NORMAL

## 2023-05-26 ENCOUNTER — TELEPHONE (OUTPATIENT)
Dept: ONCOLOGY | Age: 53
End: 2023-05-26

## 2023-06-05 ENCOUNTER — HOSPITAL ENCOUNTER (OUTPATIENT)
Dept: CT IMAGING | Age: 53
Discharge: HOME | End: 2023-06-07
Payer: COMMERCIAL

## 2023-06-05 DIAGNOSIS — Z87.891 PERSONAL HISTORY OF TOBACCO USE: ICD-10-CM

## 2023-06-05 PROCEDURE — 71271 CT THORAX LUNG CANCER SCR C-: CPT

## 2023-06-07 ENCOUNTER — OFFICE VISIT (OUTPATIENT)
Dept: PRIMARY CARE CLINIC | Age: 53
End: 2023-06-07
Payer: COMMERCIAL

## 2023-06-07 VITALS
BODY MASS INDEX: 50.11 KG/M2 | OXYGEN SATURATION: 95 % | SYSTOLIC BLOOD PRESSURE: 136 MMHG | WEIGHT: 256.6 LBS | RESPIRATION RATE: 18 BRPM | TEMPERATURE: 97.7 F | HEART RATE: 87 BPM | DIASTOLIC BLOOD PRESSURE: 84 MMHG

## 2023-06-07 DIAGNOSIS — E78.1 HIGH TRIGLYCERIDES: ICD-10-CM

## 2023-06-07 DIAGNOSIS — J44.9 CHRONIC OBSTRUCTIVE PULMONARY DISEASE, UNSPECIFIED COPD TYPE (HCC): Primary | ICD-10-CM

## 2023-06-07 DIAGNOSIS — F17.200 SMOKING ADDICTION: ICD-10-CM

## 2023-06-07 DIAGNOSIS — E11.9 TYPE 2 DIABETES MELLITUS WITHOUT COMPLICATION, WITHOUT LONG-TERM CURRENT USE OF INSULIN (HCC): ICD-10-CM

## 2023-06-07 PROCEDURE — 3044F HG A1C LEVEL LT 7.0%: CPT | Performed by: NURSE PRACTITIONER

## 2023-06-07 PROCEDURE — 99214 OFFICE O/P EST MOD 30 MIN: CPT | Performed by: NURSE PRACTITIONER

## 2023-06-07 ASSESSMENT — ENCOUNTER SYMPTOMS
ALLERGIC/IMMUNOLOGIC NEGATIVE: 1
EYES NEGATIVE: 1
BACK PAIN: 1
GASTROINTESTINAL NEGATIVE: 1
WHEEZING: 1
SHORTNESS OF BREATH: 1

## 2023-06-07 ASSESSMENT — PATIENT HEALTH QUESTIONNAIRE - PHQ9
SUM OF ALL RESPONSES TO PHQ QUESTIONS 1-9: 0
8. MOVING OR SPEAKING SO SLOWLY THAT OTHER PEOPLE COULD HAVE NOTICED. OR THE OPPOSITE, BEING SO FIGETY OR RESTLESS THAT YOU HAVE BEEN MOVING AROUND A LOT MORE THAN USUAL: 0
9. THOUGHTS THAT YOU WOULD BE BETTER OFF DEAD, OR OF HURTING YOURSELF: 0
3. TROUBLE FALLING OR STAYING ASLEEP: 0
SUM OF ALL RESPONSES TO PHQ QUESTIONS 1-9: 0
5. POOR APPETITE OR OVEREATING: 0
7. TROUBLE CONCENTRATING ON THINGS, SUCH AS READING THE NEWSPAPER OR WATCHING TELEVISION: 0
4. FEELING TIRED OR HAVING LITTLE ENERGY: 0
SUM OF ALL RESPONSES TO PHQ9 QUESTIONS 1 & 2: 0
1. LITTLE INTEREST OR PLEASURE IN DOING THINGS: 0
10. IF YOU CHECKED OFF ANY PROBLEMS, HOW DIFFICULT HAVE THESE PROBLEMS MADE IT FOR YOU TO DO YOUR WORK, TAKE CARE OF THINGS AT HOME, OR GET ALONG WITH OTHER PEOPLE: 0
6. FEELING BAD ABOUT YOURSELF - OR THAT YOU ARE A FAILURE OR HAVE LET YOURSELF OR YOUR FAMILY DOWN: 0
SUM OF ALL RESPONSES TO PHQ QUESTIONS 1-9: 0
SUM OF ALL RESPONSES TO PHQ QUESTIONS 1-9: 0
2. FEELING DOWN, DEPRESSED OR HOPELESS: 0

## 2023-06-07 NOTE — PROGRESS NOTES
for dysphoric mood. The patient is nervous/anxious. Physical Exam:   Vitals:  /84   Pulse 87   Temp 97.7 °F (36.5 °C) (Temporal)   Resp 18   Wt 256 lb 9.6 oz (116.4 kg)   LMP 07/27/2020   SpO2 95%   BMI 50.11 kg/m²     Physical Exam  Vitals and nursing note reviewed. Constitutional:       General: She is not in acute distress. Appearance: Normal appearance. She is obese. She is not ill-appearing. HENT:      Head: Normocephalic. Right Ear: External ear normal.      Left Ear: External ear normal.      Nose: Nose normal.      Mouth/Throat:      Mouth: Mucous membranes are moist.      Pharynx: Oropharynx is clear. Eyes:      Extraocular Movements: Extraocular movements intact. Conjunctiva/sclera: Conjunctivae normal.      Pupils: Pupils are equal, round, and reactive to light. Cardiovascular:      Rate and Rhythm: Normal rate and regular rhythm. Heart sounds: Normal heart sounds. No murmur heard. Pulmonary:      Effort: Pulmonary effort is normal.      Breath sounds: Normal breath sounds. No wheezing. Musculoskeletal:         General: Normal range of motion. Cervical back: Normal range of motion and neck supple. Skin:     General: Skin is warm. Capillary Refill: Capillary refill takes less than 2 seconds. Neurological:      General: No focal deficit present. Mental Status: She is alert and oriented to person, place, and time. Psychiatric:         Mood and Affect: Mood normal.         Behavior: Behavior normal.         Thought Content:  Thought content normal.         Judgment: Judgment normal.       Data:     Lab Results   Component Value Date/Time     06/10/2022 11:31 AM    K 4.5 06/10/2022 11:31 AM     06/10/2022 11:31 AM    CO2 24 06/10/2022 11:31 AM    BUN 11 06/10/2022 11:31 AM    CREATININE 0.74 06/10/2022 11:31 AM    GLUCOSE 89 06/10/2022 11:31 AM    PROT 7.3 03/21/2022 11:02 PM    LABALBU 4.1 03/21/2022 11:02 PM    BILITOT 0.16

## 2023-06-07 NOTE — PATIENT INSTRUCTIONS
SURVEY:     You may be receiving a survey from [email or web address] regarding your visit today. Please complete the survey to enable us to provide the highest quality of care to you and your family. If you cannot score us a very good on any question, please call the office to discuss how we could have made your experience a very good one.      Thank you,    Johnnie Rothman, APRN-CNP  Ofelia Diaz, APRN-CNP  Ashish Bonilla, GAIL Chiu, YOLANDA Chacon, YLOANDA Botello, CMA  Cookie, PCA  Nithya, PM

## 2023-06-09 DIAGNOSIS — E11.9 TYPE 2 DIABETES MELLITUS WITHOUT COMPLICATION, WITHOUT LONG-TERM CURRENT USE OF INSULIN (HCC): Primary | ICD-10-CM

## 2023-06-09 RX ORDER — BLOOD SUGAR DIAGNOSTIC
STRIP MISCELLANEOUS
Qty: 100 STRIP | Refills: 3 | Status: SHIPPED | OUTPATIENT
Start: 2023-06-09

## 2023-06-09 NOTE — TELEPHONE ENCOUNTER
Health Maintenance   Topic Date Due    Pneumococcal 0-64 years Vaccine (1 - PCV) Never done    Diabetic foot exam  Never done    HIV screen  Never done    Diabetic Alb to Cr ratio (uACR) test  Never done    Diabetic retinal exam  Never done    Hepatitis C screen  Never done    DTaP/Tdap/Td vaccine (1 - Tdap) Never done    Cervical cancer screen  Never done    COVID-19 Vaccine (5 - Booster) 11/29/2021    Breast cancer screen  Never done    Colorectal Cancer Screen  02/15/2023    GFR test (Diabetes, CKD 3-4, OR last GFR 15-59)  06/10/2023    Lipids  07/01/2023    Flu vaccine (Season Ended) 10/05/2023 (Originally 8/1/2023)    Hepatitis B vaccine (1 of 3 - Risk 3-dose series) 02/07/2024 (Originally 11/17/1991)    Shingles vaccine (1 of 2) 03/07/2024 (Originally 11/17/2022)    A1C test (Diabetic or Prediabetic)  02/07/2024    Low dose CT lung screening &/or counseling  06/05/2024    Depression Monitoring  06/07/2024    Hepatitis A vaccine  Aged Out    Hib vaccine  Aged Out    Meningococcal (ACWY) vaccine  Aged Out    Diabetes screen  Discontinued             (applicable per patient's age: Cancer Screenings, Depression Screening, Fall Risk Screening, Immunizations)    Hemoglobin A1C (%)   Date Value   02/07/2023 5.7   11/08/2022 5.9   07/01/2022 6.3 (H)     LDL Cholesterol (mg/dL)   Date Value   07/01/2022 110     AST (U/L)   Date Value   03/21/2022 23     ALT (U/L)   Date Value   03/21/2022 25     BUN (mg/dL)   Date Value   06/10/2022 11      (goal A1C is < 7)   (goal LDL is <100) need 30-50% reduction from baseline     BP Readings from Last 3 Encounters:   06/07/23 136/84   04/05/23 122/78   03/27/23 131/89    (goal /80)      All Future Testing planned in [facility]:  Lab Frequency Next Occurrence   ROC ANNY DIGITAL SCREEN BILATERAL Once 02/07/2023   Sleep Study with PAP Titration Once 03/27/2023   Hemoglobin A1C Once 06/07/2023   Lipid Panel Once 06/07/2023   Comprehensive Metabolic Panel, Fasting Once 06/07/2023

## 2023-06-21 ENCOUNTER — HOSPITAL ENCOUNTER (OUTPATIENT)
Dept: SLEEP CENTER | Age: 53
Discharge: HOME | End: 2023-06-21
Payer: COMMERCIAL

## 2023-06-21 VITALS — BODY MASS INDEX: 50.26 KG/M2 | HEIGHT: 60 IN | WEIGHT: 256 LBS

## 2023-06-21 DIAGNOSIS — G47.33 OSA (OBSTRUCTIVE SLEEP APNEA): ICD-10-CM

## 2023-06-21 PROCEDURE — 95811 POLYSOM 6/>YRS CPAP 4/> PARM: CPT

## 2023-06-27 LAB — STATUS: NORMAL

## 2023-07-10 ENCOUNTER — TELEPHONE (OUTPATIENT)
Dept: PRIMARY CARE CLINIC | Age: 53
End: 2023-07-10

## 2023-07-10 DIAGNOSIS — M51.36 DEGENERATIVE DISC DISEASE, LUMBAR: Primary | ICD-10-CM

## 2023-07-10 RX ORDER — GABAPENTIN 300 MG/1
300 CAPSULE ORAL 3 TIMES DAILY
Qty: 270 CAPSULE | Refills: 0 | Status: SHIPPED | OUTPATIENT
Start: 2023-07-10 | End: 2023-10-08

## 2023-07-10 NOTE — TELEPHONE ENCOUNTER
Health Maintenance   Topic Date Due    Pneumococcal 0-64 years Vaccine (1 - PCV) Never done    Diabetic foot exam  Never done    HIV screen  Never done    Diabetic Alb to Cr ratio (uACR) test  Never done    Diabetic retinal exam  Never done    Hepatitis C screen  Never done    DTaP/Tdap/Td vaccine (1 - Tdap) Never done    Cervical cancer screen  Never done    COVID-19 Vaccine (5 - Booster) 11/29/2021    Breast cancer screen  Never done    Colorectal Cancer Screen  02/15/2023    GFR test (Diabetes, CKD 3-4, OR last GFR 15-59)  06/10/2023    Lipids  07/01/2023    Hepatitis B vaccine (1 of 3 - Risk 3-dose series) 02/07/2024 (Originally 11/17/1991)    Shingles vaccine (1 of 2) 03/07/2024 (Originally 11/17/2022)    Flu vaccine (1) 08/01/2023    A1C test (Diabetic or Prediabetic)  02/07/2024    Low dose CT lung screening &/or counseling  06/05/2024    Depression Monitoring  06/07/2024    Hepatitis A vaccine  Aged Out    Hib vaccine  Aged Out    Meningococcal (ACWY) vaccine  Aged Out    Diabetes screen  Discontinued             (applicable per patient's age: Cancer Screenings, Depression Screening, Fall Risk Screening, Immunizations)    Hemoglobin A1C (%)   Date Value   02/07/2023 5.7   11/08/2022 5.9   07/01/2022 6.3 (H)     LDL Cholesterol (mg/dL)   Date Value   07/01/2022 110     AST (U/L)   Date Value   03/21/2022 23     ALT (U/L)   Date Value   03/21/2022 25     BUN (mg/dL)   Date Value   06/10/2022 11      (goal A1C is < 7)   (goal LDL is <100) need 30-50% reduction from baseline     BP Readings from Last 3 Encounters:   06/07/23 136/84   04/05/23 122/78   03/27/23 131/89    (goal /80)      All Future Testing planned in [facility]:  Lab Frequency Next Occurrence   ROC ANNY DIGITAL SCREEN BILATERAL Once 02/07/2023   Hemoglobin A1C Once 06/07/2023   Lipid Panel Once 06/07/2023   Comprehensive Metabolic Panel, Fasting Once 06/07/2023   CBC with Auto Differential Once 06/07/2023       Next Visit Date:  Future

## 2023-07-20 DIAGNOSIS — E11.9 TYPE 2 DIABETES MELLITUS WITHOUT COMPLICATION, WITHOUT LONG-TERM CURRENT USE OF INSULIN (HCC): ICD-10-CM

## 2023-07-21 ENCOUNTER — TELEPHONE (OUTPATIENT)
Dept: PRIMARY CARE CLINIC | Age: 53
End: 2023-07-21

## 2023-07-21 RX ORDER — HYDROXYZINE HYDROCHLORIDE 25 MG/1
TABLET, FILM COATED ORAL
COMMUNITY
Start: 2023-07-19

## 2023-07-21 NOTE — TELEPHONE ENCOUNTER
Please call the pulmonology department in [place] and clarify. The setting should already be completed if she had the titration study done.

## 2023-07-21 NOTE — TELEPHONE ENCOUNTER
Patient contacted the office in regards to her Sleep Study test. Patient had a sleep study done and had the titration done as well but her Pulmonologist dropped her because of insurance and now Areli Thapa is not sure what to do. Areli Thapa just needed the Pulmonologist to sign off to have her CPAP ordered. Patient was wondering if Sidney Hardwick could sign off on the titration study to have the CPAP ordered. Please advise.

## 2023-07-21 NOTE — TELEPHONE ENCOUNTER
Pulmonology contacted and clarified with me. Patient was never dropped she just needed to switch insurances due to [facility] not taking [place]. Patient then was scheduled to follow up with Dr Marlyn Maldonado on 8/14/2023 @ 12;45 pm.     Patient was called back and given appt time and was informed of the insurance situation. Patient did switch insurances and is all good to follow up and Dr Marlyn Maldonado will sign off on CPAP order.

## 2023-08-07 DIAGNOSIS — Z71.6 TOBACCO ABUSE COUNSELING: ICD-10-CM

## 2023-08-07 DIAGNOSIS — R06.83 SNORING: ICD-10-CM

## 2023-08-07 DIAGNOSIS — R06.02 SOB (SHORTNESS OF BREATH): ICD-10-CM

## 2023-08-07 DIAGNOSIS — R73.03 PRE-DIABETES: ICD-10-CM

## 2023-08-07 DIAGNOSIS — R42 DIZZINESS: ICD-10-CM

## 2023-08-07 DIAGNOSIS — R53.83 OTHER FATIGUE: ICD-10-CM

## 2023-08-07 DIAGNOSIS — R00.2 PALPITATION: ICD-10-CM

## 2023-08-07 DIAGNOSIS — R07.89 ATYPICAL CHEST PAIN: ICD-10-CM

## 2023-08-07 DIAGNOSIS — I10 PRIMARY HYPERTENSION: ICD-10-CM

## 2023-08-07 RX ORDER — CARVEDILOL 6.25 MG/1
TABLET ORAL
Qty: 180 TABLET | Refills: 3 | Status: SHIPPED | OUTPATIENT
Start: 2023-08-07

## 2023-08-08 DIAGNOSIS — M51.36 DEGENERATIVE DISC DISEASE, LUMBAR: ICD-10-CM

## 2023-08-08 RX ORDER — MELOXICAM 15 MG/1
TABLET ORAL
Qty: 90 TABLET | Refills: 1 | Status: SHIPPED | OUTPATIENT
Start: 2023-08-08

## 2023-08-08 NOTE — TELEPHONE ENCOUNTER
Health Maintenance   Topic Date Due    Pneumococcal 0-64 years Vaccine (1 - PCV) Never done    Diabetic foot exam  Never done    HIV screen  Never done    Diabetic Alb to Cr ratio (uACR) test  Never done    Diabetic retinal exam  Never done    Hepatitis C screen  Never done    DTaP/Tdap/Td vaccine (1 - Tdap) Never done    Cervical cancer screen  Never done    COVID-19 Vaccine (5 - Booster) 11/29/2021    Breast cancer screen  Never done    Colorectal Cancer Screen  02/15/2023    GFR test (Diabetes, CKD 3-4, OR last GFR 15-59)  06/10/2023    Lipids  07/01/2023    Flu vaccine (1) Never done    Hepatitis B vaccine (1 of 3 - Risk 3-dose series) 02/07/2024 (Originally 11/17/1991)    Shingles vaccine (1 of 2) 03/07/2024 (Originally 11/17/2022)    A1C test (Diabetic or Prediabetic)  02/07/2024    Low dose CT lung screening &/or counseling  06/05/2024    Depression Monitoring  06/07/2024    Hepatitis A vaccine  Aged Out    Hib vaccine  Aged Out    Meningococcal (ACWY) vaccine  Aged Out    Diabetes screen  Discontinued             (applicable per patient's age: Cancer Screenings, Depression Screening, Fall Risk Screening, Immunizations)    Hemoglobin A1C (%)   Date Value   02/07/2023 5.7   11/08/2022 5.9   07/01/2022 6.3 (H)     LDL Cholesterol (mg/dL)   Date Value   07/01/2022 110     AST (U/L)   Date Value   03/21/2022 23     ALT (U/L)   Date Value   03/21/2022 25     BUN (mg/dL)   Date Value   06/10/2022 11      (goal A1C is < 7)   (goal LDL is <100) need 30-50% reduction from baseline     BP Readings from Last 3 Encounters:   06/07/23 136/84   04/05/23 122/78   03/27/23 131/89    (goal /80)      All Future Testing planned in [facility]:  Lab Frequency Next Occurrence   ROC ANNY DIGITAL SCREEN BILATERAL Once 02/07/2023   Hemoglobin A1C Once 06/07/2023   Lipid Panel Once 06/07/2023   Comprehensive Metabolic Panel, Fasting Once 06/07/2023   CBC with Auto Differential Once 06/07/2023       Next Visit Date:  Future

## 2023-08-12 DIAGNOSIS — M51.36 DEGENERATIVE DISC DISEASE, LUMBAR: ICD-10-CM

## 2023-08-13 RX ORDER — BACLOFEN 10 MG/1
TABLET ORAL
Qty: 270 TABLET | Refills: 0 | Status: SHIPPED | OUTPATIENT
Start: 2023-08-13

## 2023-09-22 DIAGNOSIS — E11.9 TYPE 2 DIABETES MELLITUS WITHOUT COMPLICATION, WITHOUT LONG-TERM CURRENT USE OF INSULIN (HCC): ICD-10-CM

## 2023-09-22 RX ORDER — BLOOD SUGAR DIAGNOSTIC
STRIP MISCELLANEOUS
Qty: 100 STRIP | Refills: 3 | Status: SHIPPED | OUTPATIENT
Start: 2023-09-22

## 2023-09-24 DIAGNOSIS — R60.9 PERIPHERAL EDEMA: ICD-10-CM

## 2023-09-24 DIAGNOSIS — R60.0 EDEMA OF BOTH LOWER LEGS: ICD-10-CM

## 2023-09-25 RX ORDER — SPIRONOLACTONE 25 MG/1
TABLET ORAL
Qty: 90 TABLET | Refills: 3 | Status: SHIPPED | OUTPATIENT
Start: 2023-09-25

## 2023-10-04 ENCOUNTER — TELEPHONE (OUTPATIENT)
Dept: PRIMARY CARE CLINIC | Age: 53
End: 2023-10-04

## 2023-10-04 NOTE — TELEPHONE ENCOUNTER
Patient calls office stating that she doesn't feel well. Writer asked patient to explain more of what her statement ment. Patient states she thinks something is wrong with her heart. She is short of breath, has fatigue, and feels a chest heaviness. Writer asked if she was having chest or arm pain patient states no chest pain but does have a constant pain down her left arm. Writer advised patient to go to the ER for evaluation. Patient states she is trying to avoid the hospital.  Writer advised patient that with her symptoms she needs to go to the hospital for evaluation. Patient verbalized understanding.

## 2023-10-05 ENCOUNTER — TELEPHONE (OUTPATIENT)
Dept: PULMONOLOGY | Age: 53
End: 2023-10-05

## 2023-10-05 DIAGNOSIS — G47.33 OSA (OBSTRUCTIVE SLEEP APNEA): Primary | ICD-10-CM

## 2023-10-05 DIAGNOSIS — G47.34 NOCTURNAL HYPOXEMIA: ICD-10-CM

## 2023-10-05 NOTE — TELEPHONE ENCOUNTER
Mamta from sleep lab called and said that Sofia Mayen completed her sleep study-had titration study needed oxygen. Order was sent to [facility]. Patient did not receive pap machine did not contact DME company or sleep center, nor did the DME company contact sleep center that they were not in network and could not provide the PAP machine. Patient came to follow up appointment which was cancelled due to not having a machine yet. Mamta sent order to [facility] which is in network but, they need a new titration study since it has been over 30 days. She said she will also need an office visit within 30 days of the titration study either before or after. Please advise.

## 2023-10-10 DIAGNOSIS — M51.36 DEGENERATIVE DISC DISEASE, LUMBAR: ICD-10-CM

## 2023-10-10 RX ORDER — GABAPENTIN 300 MG/1
300 CAPSULE ORAL 3 TIMES DAILY
Qty: 90 CAPSULE | Refills: 0 | Status: SHIPPED | OUTPATIENT
Start: 2023-10-10 | End: 2023-11-09

## 2023-10-10 NOTE — TELEPHONE ENCOUNTER
Pending medication    Health Maintenance   Topic Date Due    Pneumococcal 0-64 years Vaccine (1 - PCV) Never done    Diabetic foot exam  Never done    HIV screen  Never done    Diabetic Alb to Cr ratio (uACR) test  Never done    Diabetic retinal exam  Never done    Hepatitis C screen  Never done    DTaP/Tdap/Td vaccine (1 - Tdap) Never done    Cervical cancer screen  Never done    COVID-19 Vaccine (5 - Pfizer series) 11/29/2021    Breast cancer screen  Never done    Colorectal Cancer Screen  02/15/2023    GFR test (Diabetes, CKD 3-4, OR last GFR 15-59)  06/10/2023    Lipids  07/01/2023    Flu vaccine (1) Never done    Hepatitis B vaccine (1 of 3 - 3-dose series) 02/07/2024 (Originally 1970)    Shingles vaccine (1 of 2) 03/07/2024 (Originally 11/17/2022)    A1C test (Diabetic or Prediabetic)  02/07/2024    Low dose CT lung screening &/or counseling  06/05/2024    Depression Monitoring  06/07/2024    Hepatitis A vaccine  Aged Out    Hib vaccine  Aged Out    Meningococcal (ACWY) vaccine  Aged Out    Diabetes screen  Discontinued             (applicable per patient's age: Cancer Screenings, Depression Screening, Fall Risk Screening, Immunizations)    Hemoglobin A1C (%)   Date Value   02/07/2023 5.7   11/08/2022 5.9   07/01/2022 6.3 (H)     LDL Cholesterol (mg/dL)   Date Value   07/01/2022 110     AST (U/L)   Date Value   03/21/2022 23     ALT (U/L)   Date Value   03/21/2022 25     BUN (mg/dL)   Date Value   06/10/2022 11      (goal A1C is < 7)   (goal LDL is <100) need 30-50% reduction from baseline     BP Readings from Last 3 Encounters:   06/07/23 136/84   04/05/23 122/78   03/27/23 131/89    (goal /80)      All Future Testing planned in [facility]:  Lab Frequency Next Occurrence   ROC ANYN DIGITAL SCREEN BILATERAL Once 02/07/2023   Hemoglobin A1C Once 06/07/2023   Lipid Panel Once 06/07/2023   Comprehensive Metabolic Panel, Fasting Once 06/07/2023   CBC with Auto Differential Once 06/07/2023       Next

## 2023-10-11 DIAGNOSIS — G47.33 OSA (OBSTRUCTIVE SLEEP APNEA): Primary | ICD-10-CM

## 2023-10-11 NOTE — PROGRESS NOTES
Patient was seen on 3/27/2023 per Dr. Cristina Benoit. Patient had a baseline sleep study done    5/21/2023 patient had a total of 3 obstructive apneas and 41 hypopneas. AHI for the entire night was 6.5 with an AHI of 1.7 during NREM sleep compared to an AHI of 17.6 during REM sleep. Minimum SPO2 was 73%. Patient spent a total of 404.3 minutes with an SPO2 of less than 89%. Patient had a total of 9 leg movements during the night for a PLM index of 1.3 events per hour with no associated arousals. Titration study 6/21/2023: Patient was titrated onto CPAP 16 cm H2O. Patient also required 2 L of oxygen along with her CPAP in order to maintain her hypoxemia. At this setting patient had 0 residual apneas or hypopneas and a minimum SPO2 of 82%. Patient had a total of 76 leg movements during the night for a PLM index of 10.5 with 4 associated arousals associated with PLM with a PLM arousal index of 0.6. Our office received communication from [address] [place] [address] and sleep lab on 10/5/2023 noting that patient had completed her sleep titration and needed oxygen in addition to CPAP. Order was sent to [facility] however machine was not received by patient and patient did not contact DME or sleep center and the [employer] company also did not contact the sleep center that they were not in network and could not provide the Pap machine. Patient came to her follow-up appointment which was canceled due to her not having machine yet. Patient order was sent to [facility] which is in network but [facility] indicated that they needed a new titration study since it has been over 30 days and that patient would need an office visit within 30 days of the titration study before or after in order to proceed.   Order for titration study was written today, patient can follow-up with me

## 2023-11-06 DIAGNOSIS — M51.36 DEGENERATIVE DISC DISEASE, LUMBAR: ICD-10-CM

## 2023-11-06 RX ORDER — GABAPENTIN 300 MG/1
300 CAPSULE ORAL 3 TIMES DAILY
Qty: 90 CAPSULE | Refills: 0 | Status: SHIPPED | OUTPATIENT
Start: 2023-11-06 | End: 2023-12-06

## 2023-11-06 RX ORDER — BACLOFEN 10 MG/1
TABLET ORAL
Qty: 270 TABLET | Refills: 0 | Status: SHIPPED | OUTPATIENT
Start: 2023-11-06

## 2023-11-06 NOTE — TELEPHONE ENCOUNTER
Health Maintenance   Topic Date Due    Pneumococcal 0-64 years Vaccine (1 - PCV) Never done    Diabetic foot exam  Never done    HIV screen  Never done    Diabetic Alb to Cr ratio (uACR) test  Never done    Diabetic retinal exam  Never done    Hepatitis C screen  Never done    DTaP/Tdap/Td vaccine (1 - Tdap) Never done    Cervical cancer screen  Never done    Breast cancer screen  Never done    Colorectal Cancer Screen  02/15/2023    GFR test (Diabetes, CKD 3-4, OR last GFR 15-59)  06/10/2023    Lipids  07/01/2023    Flu vaccine (1) Never done    COVID-19 Vaccine (5 - 2023-24 season) 09/01/2023    Hepatitis B vaccine (1 of 3 - 3-dose series) 02/07/2024 (Originally 1970)    Shingles vaccine (1 of 2) 03/07/2024 (Originally 11/17/2022)    A1C test (Diabetic or Prediabetic)  02/07/2024    Low dose CT lung screening &/or counseling  06/05/2024    Depression Monitoring  06/07/2024    Hepatitis A vaccine  Aged Out    Hib vaccine  Aged Out    Meningococcal (ACWY) vaccine  Aged Out    Diabetes screen  Discontinued             (applicable per patient's age: Cancer Screenings, Depression Screening, Fall Risk Screening, Immunizations)    Hemoglobin A1C (%)   Date Value   02/07/2023 5.7   11/08/2022 5.9   07/01/2022 6.3 (H)     LDL Cholesterol (mg/dL)   Date Value   07/01/2022 110     AST (U/L)   Date Value   03/21/2022 23     ALT (U/L)   Date Value   03/21/2022 25     BUN (mg/dL)   Date Value   06/10/2022 11      (goal A1C is < 7)   (goal LDL is <100) need 30-50% reduction from baseline     BP Readings from Last 3 Encounters:   06/07/23 136/84   04/05/23 122/78   03/27/23 131/89    (goal /80)      All Future Testing planned in [facility]:  Lab Frequency Next Occurrence   ROC ANNY DIGITAL SCREEN BILATERAL Once 02/07/2023   Hemoglobin A1C Once 06/07/2023   Lipid Panel Once 06/07/2023   Comprehensive Metabolic Panel, Fasting Once 06/07/2023   CBC with Auto Differential Once 06/07/2023   Sleep Study with PAP Titration

## 2023-11-06 NOTE — TELEPHONE ENCOUNTER
Health Maintenance   Topic Date Due    Pneumococcal 0-64 years Vaccine (1 - PCV) Never done    Diabetic foot exam  Never done    HIV screen  Never done    Diabetic Alb to Cr ratio (uACR) test  Never done    Diabetic retinal exam  Never done    Hepatitis C screen  Never done    DTaP/Tdap/Td vaccine (1 - Tdap) Never done    Cervical cancer screen  Never done    Breast cancer screen  Never done    Colorectal Cancer Screen  02/15/2023    GFR test (Diabetes, CKD 3-4, OR last GFR 15-59)  06/10/2023    Lipids  07/01/2023    Flu vaccine (1) Never done    COVID-19 Vaccine (5 - 2023-24 season) 09/01/2023    Hepatitis B vaccine (1 of 3 - 3-dose series) 02/07/2024 (Originally 1970)    Shingles vaccine (1 of 2) 03/07/2024 (Originally 11/17/2022)    A1C test (Diabetic or Prediabetic)  02/07/2024    Low dose CT lung screening &/or counseling  06/05/2024    Depression Monitoring  06/07/2024    Hepatitis A vaccine  Aged Out    Hib vaccine  Aged Out    Meningococcal (ACWY) vaccine  Aged Out    Diabetes screen  Discontinued             (applicable per patient's age: Cancer Screenings, Depression Screening, Fall Risk Screening, Immunizations)    Hemoglobin A1C (%)   Date Value   02/07/2023 5.7   11/08/2022 5.9   07/01/2022 6.3 (H)     LDL Cholesterol (mg/dL)   Date Value   07/01/2022 110     AST (U/L)   Date Value   03/21/2022 23     ALT (U/L)   Date Value   03/21/2022 25     BUN (mg/dL)   Date Value   06/10/2022 11      (goal A1C is < 7)   (goal LDL is <100) need 30-50% reduction from baseline     BP Readings from Last 3 Encounters:   06/07/23 136/84   04/05/23 122/78   03/27/23 131/89    (goal /80)      All Future Testing planned in [facility]:  Lab Frequency Next Occurrence   ROC ANNY DIGITAL SCREEN BILATERAL Once 02/07/2023   Hemoglobin A1C Once 06/07/2023   Lipid Panel Once 06/07/2023   Comprehensive Metabolic Panel, Fasting Once 06/07/2023   CBC with Auto Differential Once 06/07/2023   Sleep Study with PAP Titration negative

## 2023-11-08 NOTE — TELEPHONE ENCOUNTER
Called [facility] Oswald and went over the issue for the patient to get her cpap machine with o2 bleed in and she indicated all we need is a new order for the cpap machine with the supplies and order for the o2. Please do today so we can get the patient set up. Previous order from sleep center is under Media with the settings.

## 2023-12-01 ENCOUNTER — HOSPITAL ENCOUNTER (OUTPATIENT)
Age: 53
Discharge: HOME | End: 2023-12-01
Payer: COMMERCIAL

## 2023-12-01 DIAGNOSIS — E11.9 TYPE 2 DIABETES MELLITUS WITHOUT COMPLICATION, WITHOUT LONG-TERM CURRENT USE OF INSULIN (HCC): ICD-10-CM

## 2023-12-01 LAB
ALBUMIN SERPL-MCNC: 3.7 G/DL (ref 3.5–5.2)
ALBUMIN/GLOB SERPL: 1.2 {RATIO} (ref 1–2.5)
ALP SERPL-CCNC: 90 U/L (ref 35–104)
ALT SERPL-CCNC: 24 U/L (ref 5–33)
ANION GAP SERPL CALCULATED.3IONS-SCNC: 10 MMOL/L (ref 9–17)
AST SERPL-CCNC: 17 U/L
BASOPHILS # BLD: 0.09 K/UL (ref 0–0.2)
BASOPHILS NFR BLD: 1 % (ref 0–2)
BILIRUB SERPL-MCNC: 0.3 MG/DL (ref 0.3–1.2)
BUN SERPL-MCNC: 12 MG/DL (ref 6–20)
BUN/CREAT SERPL: 17 (ref 9–20)
CALCIUM SERPL-MCNC: 9.2 MG/DL (ref 8.6–10.4)
CHLORIDE SERPL-SCNC: 103 MMOL/L (ref 98–107)
CO2 SERPL-SCNC: 25 MMOL/L (ref 20–31)
CREAT SERPL-MCNC: 0.7 MG/DL (ref 0.5–0.9)
EOSINOPHIL # BLD: 0.17 K/UL (ref 0–0.44)
EOSINOPHILS RELATIVE PERCENT: 2 % (ref 1–4)
ERYTHROCYTE [DISTWIDTH] IN BLOOD BY AUTOMATED COUNT: 16 % (ref 11.8–14.4)
GFR SERPL CREATININE-BSD FRML MDRD: >60 ML/MIN/1.73M2
GLUCOSE P FAST SERPL-MCNC: 85 MG/DL (ref 70–99)
HCT VFR BLD AUTO: 55.2 % (ref 36.3–47.1)
HGB BLD-MCNC: 18 G/DL (ref 11.9–15.1)
IMM GRANULOCYTES # BLD AUTO: 0.05 K/UL (ref 0–0.3)
IMM GRANULOCYTES NFR BLD: 1 %
LYMPHOCYTES NFR BLD: 2.98 K/UL (ref 1.1–3.7)
LYMPHOCYTES RELATIVE PERCENT: 31 % (ref 24–43)
MCH RBC QN AUTO: 31.5 PG (ref 25.2–33.5)
MCHC RBC AUTO-ENTMCNC: 32.6 G/DL (ref 28.4–34.8)
MCV RBC AUTO: 96.5 FL (ref 82.6–102.9)
MONOCYTES NFR BLD: 1.09 K/UL (ref 0.1–1.2)
MONOCYTES NFR BLD: 11 % (ref 3–12)
NEUTROPHILS NFR BLD: 55 % (ref 36–65)
NEUTS SEG NFR BLD: 5.31 K/UL (ref 1.5–8.1)
NRBC BLD-RTO: 0 PER 100 WBC
PLATELET # BLD AUTO: 266 K/UL (ref 138–453)
PMV BLD AUTO: 9.7 FL (ref 8.1–13.5)
POTASSIUM SERPL-SCNC: 4.6 MMOL/L (ref 3.7–5.3)
PROT SERPL-MCNC: 6.9 G/DL (ref 6.4–8.3)
RBC # BLD AUTO: 5.72 M/UL (ref 3.95–5.11)
SODIUM SERPL-SCNC: 138 MMOL/L (ref 135–144)
WBC OTHER # BLD: 9.7 K/UL (ref 3.5–11.3)

## 2023-12-01 PROCEDURE — 83036 HEMOGLOBIN GLYCOSYLATED A1C: CPT

## 2023-12-01 PROCEDURE — 85025 COMPLETE CBC W/AUTO DIFF WBC: CPT

## 2023-12-01 PROCEDURE — 36415 COLL VENOUS BLD VENIPUNCTURE: CPT

## 2023-12-01 PROCEDURE — 80053 COMPREHEN METABOLIC PANEL: CPT

## 2023-12-02 DIAGNOSIS — J41.8 MIXED SIMPLE AND MUCOPURULENT CHRONIC BRONCHITIS (HCC): ICD-10-CM

## 2023-12-02 LAB
EST. AVERAGE GLUCOSE BLD GHB EST-MCNC: 126 MG/DL
HBA1C MFR BLD: 6 % (ref 4–6)

## 2023-12-04 ENCOUNTER — TELEPHONE (OUTPATIENT)
Dept: PRIMARY CARE CLINIC | Age: 53
End: 2023-12-04

## 2023-12-04 RX ORDER — MOMETASONE FUROATE AND FORMOTEROL FUMARATE DIHYDRATE 200; 5 UG/1; UG/1
AEROSOL RESPIRATORY (INHALATION)
Qty: 13 G | OUTPATIENT
Start: 2023-12-04

## 2023-12-04 NOTE — TELEPHONE ENCOUNTER
----- Message from REBEKAH Fleming CNP sent at 12/4/2023 12:56 PM EST -----  Please notify patient of stable lab results.   Thanks Laura

## 2023-12-07 ENCOUNTER — OFFICE VISIT (OUTPATIENT)
Dept: PRIMARY CARE CLINIC | Age: 53
End: 2023-12-07
Payer: COMMERCIAL

## 2023-12-07 VITALS
HEART RATE: 89 BPM | BODY MASS INDEX: 50.97 KG/M2 | OXYGEN SATURATION: 96 % | WEIGHT: 261 LBS | DIASTOLIC BLOOD PRESSURE: 78 MMHG | RESPIRATION RATE: 16 BRPM | SYSTOLIC BLOOD PRESSURE: 126 MMHG | TEMPERATURE: 97.8 F

## 2023-12-07 DIAGNOSIS — Z23 NEED FOR INFLUENZA VACCINATION: ICD-10-CM

## 2023-12-07 DIAGNOSIS — F31.9 BIPOLAR DEPRESSION (HCC): ICD-10-CM

## 2023-12-07 DIAGNOSIS — R42 DIZZINESS: ICD-10-CM

## 2023-12-07 DIAGNOSIS — F17.200 SMOKING ADDICTION: ICD-10-CM

## 2023-12-07 DIAGNOSIS — I10 PRIMARY HYPERTENSION: ICD-10-CM

## 2023-12-07 DIAGNOSIS — M51.36 DEGENERATIVE DISC DISEASE, LUMBAR: ICD-10-CM

## 2023-12-07 DIAGNOSIS — J44.9 CHRONIC OBSTRUCTIVE PULMONARY DISEASE, UNSPECIFIED COPD TYPE (HCC): Primary | ICD-10-CM

## 2023-12-07 DIAGNOSIS — R73.03 PRE-DIABETES: ICD-10-CM

## 2023-12-07 DIAGNOSIS — Z71.6 TOBACCO ABUSE COUNSELING: ICD-10-CM

## 2023-12-07 DIAGNOSIS — R60.9 PERIPHERAL EDEMA: ICD-10-CM

## 2023-12-07 PROCEDURE — 3078F DIAST BP <80 MM HG: CPT | Performed by: NURSE PRACTITIONER

## 2023-12-07 PROCEDURE — 99214 OFFICE O/P EST MOD 30 MIN: CPT | Performed by: NURSE PRACTITIONER

## 2023-12-07 PROCEDURE — 90674 CCIIV4 VAC NO PRSV 0.5 ML IM: CPT | Performed by: NURSE PRACTITIONER

## 2023-12-07 PROCEDURE — 3074F SYST BP LT 130 MM HG: CPT | Performed by: NURSE PRACTITIONER

## 2023-12-07 PROCEDURE — 90471 IMMUNIZATION ADMIN: CPT | Performed by: NURSE PRACTITIONER

## 2023-12-07 RX ORDER — SPIRONOLACTONE 25 MG/1
25 TABLET ORAL DAILY
Qty: 90 TABLET | Refills: 3 | Status: SHIPPED | OUTPATIENT
Start: 2023-12-07

## 2023-12-07 RX ORDER — VARENICLINE TARTRATE 0.5 MG/1
.5-1 TABLET, FILM COATED ORAL SEE ADMIN INSTRUCTIONS
Qty: 57 TABLET | Refills: 0 | Status: SHIPPED | OUTPATIENT
Start: 2023-12-07

## 2023-12-07 RX ORDER — HYDROXYZINE 50 MG/1
50 TABLET, FILM COATED ORAL
Qty: 90 TABLET | Refills: 0 | Status: SHIPPED | OUTPATIENT
Start: 2023-12-07 | End: 2024-03-06

## 2023-12-07 RX ORDER — BACLOFEN 10 MG/1
10 TABLET ORAL 3 TIMES DAILY
Qty: 270 TABLET | Refills: 0 | Status: SHIPPED | OUTPATIENT
Start: 2023-12-07

## 2023-12-07 RX ORDER — MELOXICAM 15 MG/1
15 TABLET ORAL DAILY
Qty: 90 TABLET | Refills: 1 | Status: SHIPPED | OUTPATIENT
Start: 2023-12-07

## 2023-12-07 RX ORDER — CARVEDILOL 6.25 MG/1
6.25 TABLET ORAL 2 TIMES DAILY
Qty: 180 TABLET | Refills: 3 | Status: SHIPPED | OUTPATIENT
Start: 2023-12-07

## 2023-12-07 RX ORDER — ARIPIPRAZOLE 30 MG/1
30 TABLET ORAL DAILY
Qty: 90 TABLET | Refills: 0 | Status: SHIPPED | OUTPATIENT
Start: 2023-12-07 | End: 2024-03-06

## 2023-12-07 RX ORDER — GABAPENTIN 300 MG/1
300 CAPSULE ORAL 3 TIMES DAILY
Qty: 90 CAPSULE | Refills: 0 | Status: SHIPPED | OUTPATIENT
Start: 2023-12-07 | End: 2024-01-06

## 2023-12-07 ASSESSMENT — COLUMBIA-SUICIDE SEVERITY RATING SCALE - C-SSRS
7. DID THIS OCCUR IN THE LAST THREE MONTHS: NO
4. HAVE YOU HAD THESE THOUGHTS AND HAD SOME INTENTION OF ACTING ON THEM?: NO
3. HAVE YOU BEEN THINKING ABOUT HOW YOU MIGHT KILL YOURSELF?: NO
5. HAVE YOU STARTED TO WORK OUT OR WORKED OUT THE DETAILS OF HOW TO KILL YOURSELF? DO YOU INTEND TO CARRY OUT THIS PLAN?: NO

## 2023-12-07 ASSESSMENT — PATIENT HEALTH QUESTIONNAIRE - PHQ9
4. FEELING TIRED OR HAVING LITTLE ENERGY: 0
SUM OF ALL RESPONSES TO PHQ9 QUESTIONS 1 & 2: 0
SUM OF ALL RESPONSES TO PHQ QUESTIONS 1-9: 0
9. THOUGHTS THAT YOU WOULD BE BETTER OFF DEAD, OR OF HURTING YOURSELF: 0
8. MOVING OR SPEAKING SO SLOWLY THAT OTHER PEOPLE COULD HAVE NOTICED. OR THE OPPOSITE, BEING SO FIGETY OR RESTLESS THAT YOU HAVE BEEN MOVING AROUND A LOT MORE THAN USUAL: 0
SUM OF ALL RESPONSES TO PHQ QUESTIONS 1-9: 0
10. IF YOU CHECKED OFF ANY PROBLEMS, HOW DIFFICULT HAVE THESE PROBLEMS MADE IT FOR YOU TO DO YOUR WORK, TAKE CARE OF THINGS AT HOME, OR GET ALONG WITH OTHER PEOPLE: 0
2. FEELING DOWN, DEPRESSED OR HOPELESS: 0
1. LITTLE INTEREST OR PLEASURE IN DOING THINGS: 0
SUM OF ALL RESPONSES TO PHQ QUESTIONS 1-9: 0
3. TROUBLE FALLING OR STAYING ASLEEP: 0
6. FEELING BAD ABOUT YOURSELF - OR THAT YOU ARE A FAILURE OR HAVE LET YOURSELF OR YOUR FAMILY DOWN: 0
5. POOR APPETITE OR OVEREATING: 0
7. TROUBLE CONCENTRATING ON THINGS, SUCH AS READING THE NEWSPAPER OR WATCHING TELEVISION: 0
SUM OF ALL RESPONSES TO PHQ QUESTIONS 1-9: 0

## 2023-12-07 NOTE — PATIENT INSTRUCTIONS
SURVEY:     You may be receiving a survey from [employer] regarding your visit today. Please complete the survey to enable us to provide the highest quality of care to you and your family. If you cannot score us a very good on any question, please call the office to discuss how we could have made your experience a very good one.      Thank you,    Kianna Rothman, APRN-CNP  Ethel Hendricks, APRN-CNP  Satnam Calles, LULÚN  Star Sandoval, CMA  Yobani Downey, CMA  Rosmery, CMA  Cookie, PCA  Nithya, PM

## 2023-12-10 ASSESSMENT — ENCOUNTER SYMPTOMS
EYES NEGATIVE: 1
WHEEZING: 1
GASTROINTESTINAL NEGATIVE: 1
ALLERGIC/IMMUNOLOGIC NEGATIVE: 1
COUGH: 1

## 2023-12-27 DIAGNOSIS — F17.200 SMOKING ADDICTION: ICD-10-CM

## 2023-12-27 RX ORDER — VARENICLINE TARTRATE 1 MG/1
1 TABLET, FILM COATED ORAL 2 TIMES DAILY
Qty: 60 TABLET | Refills: 2 | Status: SHIPPED | OUTPATIENT
Start: 2023-12-27

## 2023-12-27 RX ORDER — VARENICLINE TARTRATE 0.5 MG/1
.5-1 TABLET, FILM COATED ORAL SEE ADMIN INSTRUCTIONS
Qty: 57 TABLET | Refills: 0 | Status: CANCELLED | OUTPATIENT
Start: 2023-12-27

## 2023-12-27 NOTE — TELEPHONE ENCOUNTER
Health Maintenance   Topic Date Due    Diabetic foot exam  Never done    Diabetic Alb to Cr ratio (uACR) test  Never done    Diabetic retinal exam  Never done    Cervical cancer screen  Never done    Breast cancer screen  Never done    Colorectal Cancer Screen  02/15/2023    Lipids  07/01/2023    COVID-19 Vaccine (5 - 2023-24 season) 09/01/2023    Hepatitis B vaccine (1 of 3 - 3-dose series) 02/07/2024 (Originally 1970)    Shingles vaccine (1 of 2) 03/07/2024 (Originally 11/17/2022)    DTaP/Tdap/Td vaccine (1 - Tdap) 12/07/2024 (Originally 11/17/1991)    Pneumococcal 0-64 years Vaccine (1 - PCV) 12/07/2024 (Originally 11/17/1978)    Hepatitis C screen  12/07/2024 (Originally 11/17/1990)    HIV screen  12/07/2024 (Originally 11/17/1987)    A1C test (Diabetic or Prediabetic)  12/01/2024    GFR test (Diabetes, CKD 3-4, OR last GFR 15-59)  12/01/2024    Depression Monitoring  12/07/2024    Flu vaccine  Completed    Hepatitis A vaccine  Aged Out    Hib vaccine  Aged Out    Polio vaccine  Aged Out    Meningococcal (ACWY) vaccine  Aged Out    Diabetes screen  Discontinued    Low dose CT lung screening &/or counseling  Discontinued             (applicable per patient's age: Cancer Screenings, Depression Screening, Fall Risk Screening, Immunizations)    Hemoglobin A1C (%)   Date Value   12/01/2023 6.0   02/07/2023 5.7   11/08/2022 5.9     LDL Cholesterol (mg/dL)   Date Value   07/01/2022 110     AST (U/L)   Date Value   12/01/2023 17     ALT (U/L)   Date Value   12/01/2023 24     BUN (mg/dL)   Date Value   12/01/2023 12      (goal A1C is < 7)   (goal LDL is <100) need 30-50% reduction from baseline     BP Readings from Last 3 Encounters:   12/07/23 126/78   06/07/23 136/84   04/05/23 122/78    (goal /80)      All Future Testing planned in [facility]:  Lab Frequency Next Occurrence   ROC ANNY DIGITAL SCREEN BILATERAL Once 02/07/2023   Lipid Panel Once 06/07/2023   Sleep Study with PAP Titration Once 10/11/2023

## 2023-12-31 DIAGNOSIS — J44.9 CHRONIC OBSTRUCTIVE PULMONARY DISEASE, UNSPECIFIED COPD TYPE (HCC): ICD-10-CM

## 2023-12-31 DIAGNOSIS — J98.4 RESTRICTIVE AIRWAY DISEASE: ICD-10-CM

## 2024-01-02 RX ORDER — ALBUTEROL SULFATE 90 UG/1
AEROSOL, METERED RESPIRATORY (INHALATION)
Qty: 54 G | Refills: 3 | Status: SHIPPED | OUTPATIENT
Start: 2024-01-02

## 2024-01-02 NOTE — TELEPHONE ENCOUNTER
Next Visit Date:  Future Appointments   Date Time Provider Department Center   1/9/2024  4:00 PM Kathie Jacobson APRN - CNP [facility]   2/26/2024  3:15 PM Juan Pablo Lombardo MD [facility]            Patient Active Problem List:     Recurrent major depressive disorder (HCC)     Mixed simple and mucopurulent chronic bronchitis (HCC)     Adult body mass index 40 and over     Personal history of tobacco use

## 2024-01-09 DIAGNOSIS — M51.36 DEGENERATIVE DISC DISEASE, LUMBAR: ICD-10-CM

## 2024-01-09 RX ORDER — GABAPENTIN 300 MG/1
300 CAPSULE ORAL 3 TIMES DAILY
Qty: 90 CAPSULE | Refills: 0 | Status: SHIPPED | OUTPATIENT
Start: 2024-01-09 | End: 2024-02-08

## 2024-02-01 DIAGNOSIS — F31.9 BIPOLAR DEPRESSION (HCC): ICD-10-CM

## 2024-02-01 RX ORDER — ARIPIPRAZOLE 20 MG/1
20 TABLET ORAL DAILY
Qty: 90 TABLET | Refills: 3 | OUTPATIENT
Start: 2024-02-01

## 2024-02-12 DIAGNOSIS — M51.36 DEGENERATIVE DISC DISEASE, LUMBAR: ICD-10-CM

## 2024-02-12 RX ORDER — GABAPENTIN 300 MG/1
300 CAPSULE ORAL 3 TIMES DAILY
Qty: 180 CAPSULE | Refills: 1 | Status: SHIPPED | OUTPATIENT
Start: 2024-02-12 | End: 2024-04-12

## 2024-02-12 NOTE — TELEPHONE ENCOUNTER
Health Maintenance   Topic Date Due    Hepatitis B vaccine (1 of 3 - 3-dose series) Never done    Diabetic foot exam  Never done    Diabetic Alb to Cr ratio (uACR) test  Never done    Diabetic retinal exam  Never done    Cervical cancer screen  Never done    Breast cancer screen  Never done    Colorectal Cancer Screen  02/15/2023    Lipids  07/01/2023    COVID-19 Vaccine (5 - 2023-24 season) 09/01/2023    Shingles vaccine (1 of 2) 03/07/2024 (Originally 11/17/2022)    DTaP/Tdap/Td vaccine (1 - Tdap) 12/07/2024 (Originally 11/17/1991)    Pneumococcal 0-64 years Vaccine (1 - PCV) 12/07/2024 (Originally 11/17/1978)    Hepatitis C screen  12/07/2024 (Originally 11/17/1990)    HIV screen  12/07/2024 (Originally 11/17/1987)    A1C test (Diabetic or Prediabetic)  12/01/2024    GFR test (Diabetes, CKD 3-4, OR last GFR 15-59)  12/01/2024    Depression Monitoring  12/07/2024    Flu vaccine  Completed    Hepatitis A vaccine  Aged Out    Hib vaccine  Aged Out    Polio vaccine  Aged Out    Meningococcal (ACWY) vaccine  Aged Out    Diabetes screen  Discontinued    Low dose CT lung screening &/or counseling  Discontinued             (applicable per patient's age: Cancer Screenings, Depression Screening, Fall Risk Screening, Immunizations)    Hemoglobin A1C (%)   Date Value   12/01/2023 6.0   02/07/2023 5.7   11/08/2022 5.9     LDL Cholesterol (mg/dL)   Date Value   07/01/2022 110     AST (U/L)   Date Value   12/01/2023 17     ALT (U/L)   Date Value   12/01/2023 24     BUN (mg/dL)   Date Value   12/01/2023 12      (goal A1C is < 7)   (goal LDL is <100) need 30-50% reduction from baseline     BP Readings from Last 3 Encounters:   12/07/23 126/78   06/07/23 136/84   04/05/23 122/78    (goal /80)      All Future Testing planned in [facility]:  Lab Frequency Next Occurrence   ROC ANNY DIGITAL SCREEN BILATERAL Once 02/07/2023   Lipid Panel Once 06/07/2023   Sleep Study with PAP Titration Once 10/11/2023       Next Visit

## 2024-02-21 DIAGNOSIS — E11.9 TYPE 2 DIABETES MELLITUS WITHOUT COMPLICATION, WITHOUT LONG-TERM CURRENT USE OF INSULIN (HCC): ICD-10-CM

## 2024-02-21 RX ORDER — BLOOD SUGAR DIAGNOSTIC
STRIP MISCELLANEOUS
Qty: 100 STRIP | Refills: 3 | Status: SHIPPED | OUTPATIENT
Start: 2024-02-21

## 2024-02-26 DIAGNOSIS — E11.9 TYPE 2 DIABETES MELLITUS WITHOUT COMPLICATION, WITHOUT LONG-TERM CURRENT USE OF INSULIN (HCC): ICD-10-CM

## 2024-02-26 DIAGNOSIS — J98.4 RESTRICTIVE AIRWAY DISEASE: ICD-10-CM

## 2024-02-26 DIAGNOSIS — R60.9 PERIPHERAL EDEMA: ICD-10-CM

## 2024-02-26 DIAGNOSIS — I10 PRIMARY HYPERTENSION: ICD-10-CM

## 2024-02-26 DIAGNOSIS — R42 DIZZINESS: ICD-10-CM

## 2024-02-26 DIAGNOSIS — M51.36 DEGENERATIVE DISC DISEASE, LUMBAR: ICD-10-CM

## 2024-02-26 DIAGNOSIS — F31.9 BIPOLAR DEPRESSION (HCC): ICD-10-CM

## 2024-02-26 DIAGNOSIS — J44.9 CHRONIC OBSTRUCTIVE PULMONARY DISEASE, UNSPECIFIED COPD TYPE (HCC): ICD-10-CM

## 2024-02-26 RX ORDER — ARIPIPRAZOLE 30 MG/1
30 TABLET ORAL DAILY
Qty: 90 TABLET | Refills: 0 | Status: SHIPPED | OUTPATIENT
Start: 2024-02-26 | End: 2024-05-26

## 2024-02-26 RX ORDER — SPIRONOLACTONE 25 MG/1
25 TABLET ORAL DAILY
Qty: 90 TABLET | Refills: 3 | Status: SHIPPED | OUTPATIENT
Start: 2024-02-26

## 2024-02-26 RX ORDER — BACLOFEN 10 MG/1
10 TABLET ORAL 3 TIMES DAILY
Qty: 270 TABLET | Refills: 0 | Status: SHIPPED | OUTPATIENT
Start: 2024-02-26

## 2024-02-26 RX ORDER — CARVEDILOL 6.25 MG/1
6.25 TABLET ORAL 2 TIMES DAILY
Qty: 180 TABLET | Refills: 3 | Status: SHIPPED | OUTPATIENT
Start: 2024-02-26

## 2024-02-26 RX ORDER — GABAPENTIN 300 MG/1
300 CAPSULE ORAL 3 TIMES DAILY
Qty: 180 CAPSULE | Refills: 1 | Status: SHIPPED | OUTPATIENT
Start: 2024-02-26 | End: 2024-06-25

## 2024-02-26 RX ORDER — MELOXICAM 15 MG/1
15 TABLET ORAL DAILY
Qty: 90 TABLET | Refills: 1 | Status: SHIPPED | OUTPATIENT
Start: 2024-02-26

## 2024-02-26 RX ORDER — ALBUTEROL SULFATE 90 UG/1
AEROSOL, METERED RESPIRATORY (INHALATION)
Qty: 54 G | Refills: 3 | Status: SHIPPED | OUTPATIENT
Start: 2024-02-26

## 2024-02-26 RX ORDER — HYDROXYZINE 50 MG/1
50 TABLET, FILM COATED ORAL
Qty: 90 TABLET | Refills: 0 | Status: SHIPPED | OUTPATIENT
Start: 2024-02-26 | End: 2024-05-26

## 2024-02-29 DIAGNOSIS — J44.9 CHRONIC OBSTRUCTIVE PULMONARY DISEASE, UNSPECIFIED COPD TYPE (HCC): ICD-10-CM

## 2024-03-05 DIAGNOSIS — E11.9 TYPE 2 DIABETES MELLITUS WITHOUT COMPLICATION, WITHOUT LONG-TERM CURRENT USE OF INSULIN (HCC): ICD-10-CM

## 2024-03-05 DIAGNOSIS — M51.36 DEGENERATIVE DISC DISEASE, LUMBAR: ICD-10-CM

## 2024-03-05 DIAGNOSIS — F31.9 BIPOLAR DEPRESSION (HCC): ICD-10-CM

## 2024-03-05 RX ORDER — BACLOFEN 10 MG/1
10 TABLET ORAL 3 TIMES DAILY
Qty: 270 TABLET | Refills: 0 | OUTPATIENT
Start: 2024-03-05

## 2024-03-05 RX ORDER — GABAPENTIN 300 MG/1
300 CAPSULE ORAL 3 TIMES DAILY
Qty: 180 CAPSULE | Refills: 1 | OUTPATIENT
Start: 2024-03-05 | End: 2024-07-03

## 2024-03-05 RX ORDER — ARIPIPRAZOLE 30 MG/1
30 TABLET ORAL DAILY
Qty: 90 TABLET | Refills: 0 | OUTPATIENT
Start: 2024-03-05 | End: 2024-06-03

## 2024-03-05 RX ORDER — MELOXICAM 15 MG/1
15 TABLET ORAL DAILY
Qty: 90 TABLET | Refills: 1 | OUTPATIENT
Start: 2024-03-05

## 2024-03-05 NOTE — TELEPHONE ENCOUNTER
Health Maintenance   Topic Date Due    Hepatitis B vaccine (1 of 3 - 3-dose series) Never done    Diabetic foot exam  Never done    Diabetic Alb to Cr ratio (uACR) test  Never done    Diabetic retinal exam  Never done    Cervical cancer screen  Never done    Breast cancer screen  Never done    Colorectal Cancer Screen  02/15/2023    Lipids  07/01/2023    COVID-19 Vaccine (5 - 2023-24 season) 09/01/2023    Shingles vaccine (1 of 2) 03/07/2024 (Originally 11/17/2022)    DTaP/Tdap/Td vaccine (1 - Tdap) 12/07/2024 (Originally 11/17/1991)    Pneumococcal 0-64 years Vaccine (1 - PCV) 12/07/2024 (Originally 11/17/1976)    Hepatitis C screen  12/07/2024 (Originally 11/17/1990)    HIV screen  12/07/2024 (Originally 11/17/1987)    A1C test (Diabetic or Prediabetic)  12/01/2024    GFR test (Diabetes, CKD 3-4, OR last GFR 15-59)  12/01/2024    Depression Monitoring  12/07/2024    Flu vaccine  Completed    Hepatitis A vaccine  Aged Out    Hib vaccine  Aged Out    Polio vaccine  Aged Out    Meningococcal (ACWY) vaccine  Aged Out    Diabetes screen  Discontinued    Low dose CT lung screening &/or counseling  Discontinued             (applicable per patient's age: Cancer Screenings, Depression Screening, Fall Risk Screening, Immunizations)    Hemoglobin A1C (%)   Date Value   12/01/2023 6.0   02/07/2023 5.7   11/08/2022 5.9     LDL Cholesterol (mg/dL)   Date Value   07/01/2022 110     AST (U/L)   Date Value   12/01/2023 17     ALT (U/L)   Date Value   12/01/2023 24     BUN (mg/dL)   Date Value   12/01/2023 12      (goal A1C is < 7)   (goal LDL is <100) need 30-50% reduction from baseline     BP Readings from Last 3 Encounters:   12/07/23 126/78   06/07/23 136/84   04/05/23 122/78    (goal /80)      All Future Testing planned in [facility]:  Lab Frequency Next Occurrence   ROC ANNY DIGITAL SCREEN BILATERAL Once 02/07/2023   Lipid Panel Once 06/07/2023   Sleep Study with PAP Titration Once 10/11/2023       Next Visit  Patient Education   Here is the plan from today's visit    - Look into getting DULCOLAX or COLACE from [employer] for constipation      Please call or return to clinic if your symptoms don't go away.    Follow up plan  No follow-ups on file.    Thank you for coming to [facility] today.  Lab Testing:  **If you had lab testing today and your results are reassuring or normal they will be mailed to you or sent through [employer] within 7 days.   **If the lab tests need quick action we will call you with the results.  **If you are having labs done on a different day, please call [phone] to schedule at [facility] or [phone] for other [facility] locations. Labs do not offer walk-in appointments.  The phone number we will call with results is # [phone] (home) . If this is not the best number please call our clinic and change the number.  Medication Refills:  If you need any refills please call your pharmacy and they will contact us.   If you need to  your refill at a new pharmacy, please contact the new pharmacy directly. The new pharmacy will help you get your medications transferred faster.   Scheduling:  If you have any concerns about today's visit or wish to schedule another appointment please call our office during normal business hours [phone] (8-5:00 M-F). If you can no longer make a scheduled visit, please cancel via [employer] or call us to cancel.   If a referral was made to an [facility] specialty provider and you do not get a call from central scheduling, please refer to directions on your visit summary or call our office during normal business hours for assistance.   If a Mammogram was ordered for you at the Breast Center call [phone] to schedule or change your appointment.  If you had an XRay/CT/Ultrasound/MRI ordered the number is [phone] to schedule or change your radiology appointment.   [facility] has limited ultrasound appointments  available on Wednesdays, if you would like your ultrasound at [facility], please call [phone] to schedule.   Medical Concerns:  If you have urgent medical concerns please call [phone] at any time of the day.    Soy Worthy, DO

## 2024-03-26 SDOH — ECONOMIC STABILITY: FOOD INSECURITY: WITHIN THE PAST 12 MONTHS, YOU WORRIED THAT YOUR FOOD WOULD RUN OUT BEFORE YOU GOT MONEY TO BUY MORE.: SOMETIMES TRUE

## 2024-03-26 SDOH — ECONOMIC STABILITY: FOOD INSECURITY: WITHIN THE PAST 12 MONTHS, THE FOOD YOU BOUGHT JUST DIDN'T LAST AND YOU DIDN'T HAVE MONEY TO GET MORE.: SOMETIMES TRUE

## 2024-03-26 SDOH — ECONOMIC STABILITY: INCOME INSECURITY: HOW HARD IS IT FOR YOU TO PAY FOR THE VERY BASICS LIKE FOOD, HOUSING, MEDICAL CARE, AND HEATING?: SOMEWHAT HARD

## 2024-03-26 SDOH — ECONOMIC STABILITY: TRANSPORTATION INSECURITY
IN THE PAST 12 MONTHS, HAS LACK OF TRANSPORTATION KEPT YOU FROM MEETINGS, WORK, OR FROM GETTING THINGS NEEDED FOR DAILY LIVING?: YES

## 2024-03-26 ASSESSMENT — PATIENT HEALTH QUESTIONNAIRE - PHQ9
8. MOVING OR SPEAKING SO SLOWLY THAT OTHER PEOPLE COULD HAVE NOTICED. OR THE OPPOSITE - BEING SO FIDGETY OR RESTLESS THAT YOU HAVE BEEN MOVING AROUND A LOT MORE THAN USUAL: SEVERAL DAYS
SUM OF ALL RESPONSES TO PHQ9 QUESTIONS 1 & 2: 3
SUM OF ALL RESPONSES TO PHQ QUESTIONS 1-9: 9
1. LITTLE INTEREST OR PLEASURE IN DOING THINGS: MORE THAN HALF THE DAYS
9. THOUGHTS THAT YOU WOULD BE BETTER OFF DEAD, OR OF HURTING YOURSELF: NOT AT ALL
SUM OF ALL RESPONSES TO PHQ QUESTIONS 1-9: 9
7. TROUBLE CONCENTRATING ON THINGS, SUCH AS READING THE NEWSPAPER OR WATCHING TELEVISION: SEVERAL DAYS
5. POOR APPETITE OR OVEREATING: SEVERAL DAYS
SUM OF ALL RESPONSES TO PHQ QUESTIONS 1-9: 9
SUM OF ALL RESPONSES TO PHQ QUESTIONS 1-9: 9
9. THOUGHTS THAT YOU WOULD BE BETTER OFF DEAD, OR OF HURTING YOURSELF: NOT AT ALL
8. MOVING OR SPEAKING SO SLOWLY THAT OTHER PEOPLE COULD HAVE NOTICED. OR THE OPPOSITE, BEING SO FIGETY OR RESTLESS THAT YOU HAVE BEEN MOVING AROUND A LOT MORE THAN USUAL: SEVERAL DAYS
3. TROUBLE FALLING OR STAYING ASLEEP: SEVERAL DAYS
2. FEELING DOWN, DEPRESSED OR HOPELESS: SEVERAL DAYS
4. FEELING TIRED OR HAVING LITTLE ENERGY: SEVERAL DAYS
6. FEELING BAD ABOUT YOURSELF - OR THAT YOU ARE A FAILURE OR HAVE LET YOURSELF OR YOUR FAMILY DOWN: SEVERAL DAYS
7. TROUBLE CONCENTRATING ON THINGS, SUCH AS READING THE NEWSPAPER OR WATCHING TELEVISION: SEVERAL DAYS
10. IF YOU CHECKED OFF ANY PROBLEMS, HOW DIFFICULT HAVE THESE PROBLEMS MADE IT FOR YOU TO DO YOUR WORK, TAKE CARE OF THINGS AT HOME, OR GET ALONG WITH OTHER PEOPLE: SOMEWHAT DIFFICULT
5. POOR APPETITE OR OVEREATING: SEVERAL DAYS
3. TROUBLE FALLING OR STAYING ASLEEP: SEVERAL DAYS
1. LITTLE INTEREST OR PLEASURE IN DOING THINGS: MORE THAN HALF THE DAYS
SUM OF ALL RESPONSES TO PHQ QUESTIONS 1-9: 9
6. FEELING BAD ABOUT YOURSELF - OR THAT YOU ARE A FAILURE OR HAVE LET YOURSELF OR YOUR FAMILY DOWN: SEVERAL DAYS
2. FEELING DOWN, DEPRESSED OR HOPELESS: SEVERAL DAYS
4. FEELING TIRED OR HAVING LITTLE ENERGY: SEVERAL DAYS
10. IF YOU CHECKED OFF ANY PROBLEMS, HOW DIFFICULT HAVE THESE PROBLEMS MADE IT FOR YOU TO DO YOUR WORK, TAKE CARE OF THINGS AT HOME, OR GET ALONG WITH OTHER PEOPLE: SOMEWHAT DIFFICULT

## 2024-03-29 ENCOUNTER — OFFICE VISIT (OUTPATIENT)
Dept: PRIMARY CARE CLINIC | Age: 54
End: 2024-03-29
Payer: COMMERCIAL

## 2024-03-29 VITALS
RESPIRATION RATE: 16 BRPM | SYSTOLIC BLOOD PRESSURE: 122 MMHG | TEMPERATURE: 98.7 F | DIASTOLIC BLOOD PRESSURE: 70 MMHG | HEART RATE: 72 BPM | BODY MASS INDEX: 50.04 KG/M2 | OXYGEN SATURATION: 95 % | WEIGHT: 256.2 LBS

## 2024-03-29 DIAGNOSIS — Z12.11 COLON CANCER SCREENING: ICD-10-CM

## 2024-03-29 DIAGNOSIS — F31.9 BIPOLAR DEPRESSION (HCC): ICD-10-CM

## 2024-03-29 DIAGNOSIS — Z12.31 OTHER SCREENING MAMMOGRAM: ICD-10-CM

## 2024-03-29 DIAGNOSIS — J44.1 COPD WITH ACUTE EXACERBATION (HCC): Primary | ICD-10-CM

## 2024-03-29 PROCEDURE — 99214 OFFICE O/P EST MOD 30 MIN: CPT | Performed by: NURSE PRACTITIONER

## 2024-03-29 RX ORDER — PREDNISONE 20 MG/1
20 TABLET ORAL 2 TIMES DAILY
Qty: 10 TABLET | Refills: 0 | Status: SHIPPED | OUTPATIENT
Start: 2024-03-29 | End: 2024-04-03

## 2024-03-29 RX ORDER — AZITHROMYCIN 250 MG/1
TABLET, FILM COATED ORAL
Qty: 6 TABLET | Refills: 0 | Status: SHIPPED | OUTPATIENT
Start: 2024-03-29 | End: 2024-04-08

## 2024-03-29 ASSESSMENT — ENCOUNTER SYMPTOMS
EYES NEGATIVE: 1
GASTROINTESTINAL NEGATIVE: 1
WHEEZING: 1
ALLERGIC/IMMUNOLOGIC NEGATIVE: 1
SHORTNESS OF BREATH: 1
COUGH: 1

## 2024-03-29 NOTE — PROGRESS NOTES
[facility]  SAUD SHEN CNP  [facility]  [address] [place] [id number]  Dept: [phone]  Dept Fax: [phone]      Name: Yesi Zuluaga  : 1970         Chief Complaint:     Chief Complaint   Patient presents with    Depression     Check up. Patient doing well on increased Abilify.        History of Present Illness:      Yesi Zuluaga is a 53 y.o.  female who presents with Depression (Check up. Patient doing well on increased Abilify. )      HPI    Yesi is here today for a routine office visit.  She states she is doing well on the Abilify.  She denies any increased depression at this time.      She has started 2 days ago with a productive cough and congestion.   She has been checking her Sao2 at home and was running low around 95-96%.  She has had increased wheezing that started 2 days ago.  She continues to have shortness of breath with activity.    She states she is quitting smoking next month cold turkey.      Past Medical History:     Past Medical History:   Diagnosis Date    Anxiety     Congenital heart disease     COPD (chronic obstructive pulmonary disease) (HCC)     Depression     Family history of thyroid problem     Headache     Hypertension     Obesity     Osteoarthritis     Sleep apnea     Type 2 diabetes mellitus without complication (HCC)       Reviewed all health maintenance requirements and ordered appropriate tests  Health Maintenance Due   Topic Date Due    Diabetic foot exam  Never done    Diabetic Alb to Cr ratio (uACR) test  Never done    Diabetic retinal exam  Never done    Cervical cancer screen  Never done    Breast cancer screen  Never done    Colorectal Cancer Screen  02/15/2023    Lipids  2023       Past Surgical History:     Past Surgical History:   Procedure Laterality Date    CHOLECYSTECTOMY          Medications:       Prior to Admission medications    Medication Sig Start Date End Date Taking? Authorizing Provider

## 2024-03-29 NOTE — PATIENT INSTRUCTIONS
SURVEY:     You may be receiving a survey from [facility] [employer] regarding your visit today.     Please complete the survey to enable us to provide the highest quality of care to you and your family.     If you cannot score us a very good on any question, please call the office to discuss how we could have made your experience a very good one.     Thank you,    Jaison Rothman, APRN-CNP  Kathie Jacobson, APRN-CNP  Elaine, LPN  Sarah, CMA  Rudolph, CMA  Rosmery, CMA  Cookie, PCA  Casie, CMA  Nithya, PM

## 2024-04-22 ENCOUNTER — OFFICE VISIT (OUTPATIENT)
Dept: PULMONOLOGY | Age: 54
End: 2024-04-22

## 2024-04-22 VITALS
WEIGHT: 251.2 LBS | HEIGHT: 60 IN | SYSTOLIC BLOOD PRESSURE: 137 MMHG | HEART RATE: 100 BPM | DIASTOLIC BLOOD PRESSURE: 72 MMHG | BODY MASS INDEX: 49.32 KG/M2 | RESPIRATION RATE: 16 BRPM | TEMPERATURE: 95.8 F | OXYGEN SATURATION: 92 %

## 2024-04-22 DIAGNOSIS — G47.33 OSA (OBSTRUCTIVE SLEEP APNEA): ICD-10-CM

## 2024-04-22 DIAGNOSIS — D75.1 POLYCYTHEMIA: ICD-10-CM

## 2024-04-22 DIAGNOSIS — Z87.891 PERSONAL HISTORY OF TOBACCO USE: ICD-10-CM

## 2024-04-22 DIAGNOSIS — J41.8 MIXED SIMPLE AND MUCOPURULENT CHRONIC BRONCHITIS (HCC): Primary | ICD-10-CM

## 2024-04-22 DIAGNOSIS — R91.1 PULMONARY NODULE: ICD-10-CM

## 2024-04-22 DIAGNOSIS — E66.01 MORBID OBESITY WITH BMI OF 45.0-49.9, ADULT (HCC): ICD-10-CM

## 2024-04-22 NOTE — PROGRESS NOTES
PULMONARY MEDICINE OUTPATIENT NOTE                                                                       PATIENT:  Yesi Zuluaga  YOB: 1970  MRN: [id number]     REFERRED BY: Kathie Jacobson, APRN - CNP   CHIEF COMPLIANT: Sleep Apnea (States using cpap and kelvin. Well..Download under Media .  DME MSC)       HISTORY     Yesi Zuluaga is a 53 y.o. year old female here for follow-up    INITIAL VISIT: 3/27/2023  TODAY's VISIT:4/22/2024    PULMONARY PROBLEM LIST:  1. Mixed simple and mucopurulent chronic bronchitis (HCC)    2. Pulmonary nodule    3. VANESSA (obstructive sleep apnea)    4. Personal history of tobacco use    5. Morbid obesity with BMI of 45.0-49.9, adult (HCC)    6. Polycythemia         HISTORY OF PRESENT ILLNESS/CURRENT SYMPTOMS:   Patient is a chronic smoker with about 28-pack-year smoking history.  She is currently smoking about 4 cigarettes/day.  She has been diagnosed to have COPD for last few years.  She has chronic cough, sputum and grade 3 exertional dyspnea.      PFT (9/28/2022) shows nonspecific ventilatory impairment with evidence of air trapping.    CT chest (3/22/2022) showed nonspecific mosaic attenuation.  CT chest (6/5/2023) showed mild emphysematous changes, a 6 mm groundglass nodule in the right lower lobe and a 2 mm nodule in the medial left lower lobe along with mild dependent atelectasis    She is morbidly obese and also has obstructive sleep apnea.    Home sleep study (August 2022) was negative.  Sleep study (5/21/2023) showed AHI of 6.5, REM AHI 17.6, O2 joshua 73%  Titration study (6/21/2023) recommended CPAP at 16 cm water pressure.    Patient has been using CPAP as recommended and is benefiting from therapy.  Compliance data was reviewed, no significant residual AHI noted.    CURRENT BRONCHODILATORS/OTHER PULMONARY MEDS:  Dulera 100/5  Albuterol HFA    EPWORTH SLEEPINESS SCALE:      6/21/2023     8:02 PM 5/21/2023     8:20 PM 5/21/2023     8:17 PM 3/27/2023     9:26 AM

## 2024-04-22 NOTE — PATIENT INSTRUCTIONS
SURVEY:    You may be receiving a survey from [employer] regarding your visit today.    Please complete the survey to enable us to provide the highest quality of care to you and your family.    If you cannot score us a very good on any question, please call the office to discuss how we could have made your experience a very good one.    Thank you.             Learning About Lung Cancer Screening  What is screening for lung cancer?     Lung cancer screening is a way to find some lung cancers early, before a person has any symptoms of the cancer.  Lung cancer screening may help those who have the highest risk for lung cancer--people age 50 and older who are or were heavy smokers. For most people, who aren't at increased risk, screening for lung cancer probably isn't helpful.  Screening won't prevent cancer. And it may not find all lung cancers. Lung cancer screening may lower the risk of dying from lung cancer in a small number of people.  How is it done?  Lung cancer screening is done with a low-dose CT (computed tomography) scan. A CT scan uses X-rays, or radiation, to make detailed pictures of your body. Experts recommend that screening be done in medical centers that focus on finding and treating lung cancer.  Who is screening recommended for?  Lung cancer screening is recommended for people age 50 and older who are or were heavy smokers. That means people with a smoking history of at least 20 pack years. A pack year is a way to measure how heavy a smoker you are or were.  To figure out your pack years, multiply how many packs a day on average (assuming 20 cigarettes per pack) you have smoked by how many years you have smoked. For example:  If you smoked 1 pack a day for 20 years, that's 1 times 20. So you have a smoking history of 20 pack years.  If you smoked 2 packs a day for 10 years, that's 2 times 10. So you have a smoking history of 20 pack years.  Experts agree that screening is for people who have a

## 2024-04-22 NOTE — PROGRESS NOTES
Discussed with the patient the current USPSTF guidelines released March 9, 2021 for screening for lung cancer.    For adults aged 50 to 80 years who have a 20 pack-year smoking history and currently smoke or have quit within the past 15 years the grade B recommendation is to:  Screen for lung cancer with low-dose computed tomography (LDCT) every year.  Stop screening once a person has not smoked for 15 years or has a health problem that limits life expectancy or the ability to have lung surgery.    The patient  reports that she has been smoking cigarettes. She started smoking about 2 years ago. She has a 32.9 pack-year smoking history. She has never used smokeless tobacco.. Discussed with patient the risks and benefits of screening, including over-diagnosis, false positive rate, and total radiation exposure.  The patient currently exhibits no signs or symptoms suggestive of lung cancer.  Discussed with patient the importance of compliance with yearly annual lung cancer screenings and willingness to undergo diagnosis and treatment if screening scan is positive.  In addition, the patient was counseled regarding the importance of remaining smoke free and/or total smoking cessation.    Also reviewed the following if the patient has Medicare that as of February 10, 2022, Medicare only covers LDCT screening in patients aged 50-77 with at least a 20 pack-year smoking history who currently smoke or have quit in the last 15 years

## 2024-04-28 PROBLEM — D75.1 POLYCYTHEMIA: Status: ACTIVE | Noted: 2024-04-28

## 2024-05-08 ENCOUNTER — HOSPITAL ENCOUNTER (OUTPATIENT)
Dept: WOMENS IMAGING | Age: 54
Discharge: HOME | End: 2024-05-10
Payer: COMMERCIAL

## 2024-05-08 VITALS — WEIGHT: 250 LBS | HEIGHT: 60 IN | BODY MASS INDEX: 49.08 KG/M2

## 2024-05-08 DIAGNOSIS — Z12.31 OTHER SCREENING MAMMOGRAM: ICD-10-CM

## 2024-05-08 PROCEDURE — 77063 BREAST TOMOSYNTHESIS BI: CPT

## 2024-06-06 ENCOUNTER — HOSPITAL ENCOUNTER (OUTPATIENT)
Dept: CT IMAGING | Age: 54
Discharge: HOME | End: 2024-06-08
Attending: INTERNAL MEDICINE
Payer: COMMERCIAL

## 2024-06-06 DIAGNOSIS — Z87.891 PERSONAL HISTORY OF TOBACCO USE: ICD-10-CM

## 2024-06-06 PROCEDURE — 71271 CT THORAX LUNG CANCER SCR C-: CPT

## 2024-06-07 DIAGNOSIS — F31.9 BIPOLAR DEPRESSION (HCC): ICD-10-CM

## 2024-06-07 RX ORDER — ARIPIPRAZOLE 30 MG/1
TABLET ORAL
Qty: 90 TABLET | Refills: 1 | Status: SHIPPED | OUTPATIENT
Start: 2024-06-07

## 2024-06-10 DIAGNOSIS — M51.36 DEGENERATIVE DISC DISEASE, LUMBAR: ICD-10-CM

## 2024-06-10 RX ORDER — BACLOFEN 10 MG/1
10 TABLET ORAL 3 TIMES DAILY
Qty: 270 TABLET | Refills: 0 | Status: SHIPPED | OUTPATIENT
Start: 2024-06-10

## 2024-06-10 NOTE — TELEPHONE ENCOUNTER
Health Maintenance   Topic Date Due    Diabetic foot exam  Never done    Diabetic Alb to Cr ratio (uACR) test  Never done    Diabetic retinal exam  Never done    Cervical cancer screen  Never done    Colorectal Cancer Screen  02/15/2023    Lipids  07/01/2023    DTaP/Tdap/Td vaccine (1 - Tdap) 12/07/2024 (Originally 11/17/1991)    Pneumococcal 0-64 years Vaccine (1 of 2 - PCV) 12/07/2024 (Originally 11/17/1978)    Hepatitis C screen  12/07/2024 (Originally 11/17/1990)    HIV screen  12/07/2024 (Originally 11/17/1987)    Hepatitis B vaccine (1 of 3 - 3-dose series) 03/29/2025 (Originally 1970)    Shingles vaccine (1 of 2) 03/29/2025 (Originally 11/17/2022)    COVID-19 Vaccine (5 - 2023-24 season) 03/29/2025 (Originally 9/1/2023)    A1C test (Diabetic or Prediabetic)  12/01/2024    GFR test (Diabetes, CKD 3-4, OR last GFR 15-59)  12/01/2024    Depression Monitoring  03/26/2025    Low dose CT lung screening &/or counseling  06/06/2025    Breast cancer screen  05/08/2026    Flu vaccine  Completed    Hepatitis A vaccine  Aged Out    Hib vaccine  Aged Out    Polio vaccine  Aged Out    Meningococcal (ACWY) vaccine  Aged Out    Diabetes screen  Discontinued             (applicable per patient's age: Cancer Screenings, Depression Screening, Fall Risk Screening, Immunizations)    Hemoglobin A1C (%)   Date Value   12/01/2023 6.0   02/07/2023 5.7   11/08/2022 5.9     AST (U/L)   Date Value   12/01/2023 17     ALT (U/L)   Date Value   12/01/2023 24     BUN (mg/dL)   Date Value   12/01/2023 12      (goal A1C is < 7)   (goal LDL is <100) need 30-50% reduction from baseline     BP Readings from Last 3 Encounters:   04/22/24 137/72   03/29/24 122/70   12/07/23 126/78    (goal /80)      All Future Testing planned in [facility]:  Lab Frequency Next Occurrence   Sleep Study with PAP Titration Once 10/11/2023   POCT Fecal Immunochemical Test (FIT) Once 04/19/2024       Next Visit Date:  Future Appointments   Date Time Provider

## 2024-06-28 DIAGNOSIS — F31.9 BIPOLAR DEPRESSION (HCC): ICD-10-CM

## 2024-06-28 RX ORDER — HYDROXYZINE 50 MG/1
50 TABLET, FILM COATED ORAL
Qty: 90 TABLET | Refills: 0 | Status: SHIPPED | OUTPATIENT
Start: 2024-06-28 | End: 2024-09-26

## 2024-07-01 RX ORDER — HYDROXYZINE 50 MG/1
TABLET, FILM COATED ORAL
Qty: 90 TABLET | Refills: 0 | Status: SHIPPED | OUTPATIENT
Start: 2024-07-01

## 2024-07-02 ENCOUNTER — OFFICE VISIT (OUTPATIENT)
Dept: PRIMARY CARE CLINIC | Age: 54
End: 2024-07-02
Payer: COMMERCIAL

## 2024-07-02 VITALS
TEMPERATURE: 98.4 F | DIASTOLIC BLOOD PRESSURE: 84 MMHG | BODY MASS INDEX: 50.27 KG/M2 | OXYGEN SATURATION: 95 % | WEIGHT: 257.4 LBS | SYSTOLIC BLOOD PRESSURE: 124 MMHG | RESPIRATION RATE: 16 BRPM | HEART RATE: 86 BPM

## 2024-07-02 DIAGNOSIS — M25.512 ACUTE PAIN OF LEFT SHOULDER: ICD-10-CM

## 2024-07-02 DIAGNOSIS — E66.01 CLASS 3 SEVERE OBESITY WITH SERIOUS COMORBIDITY AND BODY MASS INDEX (BMI) OF 50.0 TO 59.9 IN ADULT, UNSPECIFIED OBESITY TYPE (HCC): ICD-10-CM

## 2024-07-02 DIAGNOSIS — R06.02 SHORTNESS OF BREATH: ICD-10-CM

## 2024-07-02 DIAGNOSIS — G47.33 OSA (OBSTRUCTIVE SLEEP APNEA): ICD-10-CM

## 2024-07-02 DIAGNOSIS — R60.0 PERIPHERAL EDEMA: ICD-10-CM

## 2024-07-02 DIAGNOSIS — I10 PRIMARY HYPERTENSION: ICD-10-CM

## 2024-07-02 DIAGNOSIS — E78.1 HIGH TRIGLYCERIDES: ICD-10-CM

## 2024-07-02 DIAGNOSIS — M51.36 DEGENERATIVE DISC DISEASE, LUMBAR: ICD-10-CM

## 2024-07-02 DIAGNOSIS — E11.9 TYPE 2 DIABETES MELLITUS WITHOUT COMPLICATION, WITHOUT LONG-TERM CURRENT USE OF INSULIN (HCC): Primary | ICD-10-CM

## 2024-07-02 DIAGNOSIS — R53.83 OTHER FATIGUE: ICD-10-CM

## 2024-07-02 DIAGNOSIS — F17.200 SMOKING ADDICTION: ICD-10-CM

## 2024-07-02 LAB — HBA1C MFR BLD: 5.8 %

## 2024-07-02 PROCEDURE — 99214 OFFICE O/P EST MOD 30 MIN: CPT | Performed by: NURSE PRACTITIONER

## 2024-07-02 PROCEDURE — 3074F SYST BP LT 130 MM HG: CPT | Performed by: NURSE PRACTITIONER

## 2024-07-02 PROCEDURE — 3044F HG A1C LEVEL LT 7.0%: CPT | Performed by: NURSE PRACTITIONER

## 2024-07-02 PROCEDURE — 83036 HEMOGLOBIN GLYCOSYLATED A1C: CPT | Performed by: NURSE PRACTITIONER

## 2024-07-02 PROCEDURE — 3079F DIAST BP 80-89 MM HG: CPT | Performed by: NURSE PRACTITIONER

## 2024-07-02 RX ORDER — TIRZEPATIDE 2.5 MG/.5ML
2.5 INJECTION, SOLUTION SUBCUTANEOUS WEEKLY
Qty: 2 ML | Refills: 0 | Status: SHIPPED | OUTPATIENT
Start: 2024-07-02 | End: 2024-08-01

## 2024-07-02 RX ORDER — GABAPENTIN 300 MG/1
300 CAPSULE ORAL 3 TIMES DAILY
Qty: 180 CAPSULE | Refills: 2 | Status: SHIPPED | OUTPATIENT
Start: 2024-07-02 | End: 2024-12-29

## 2024-07-02 RX ORDER — BACLOFEN 10 MG/1
10 TABLET ORAL 3 TIMES DAILY
Qty: 270 TABLET | Refills: 3 | Status: SHIPPED | OUTPATIENT
Start: 2024-07-02

## 2024-07-02 ASSESSMENT — PATIENT HEALTH QUESTIONNAIRE - PHQ9
2. FEELING DOWN, DEPRESSED OR HOPELESS: NOT AT ALL
SUM OF ALL RESPONSES TO PHQ9 QUESTIONS 1 & 2: 0
SUM OF ALL RESPONSES TO PHQ QUESTIONS 1-9: 0
8. MOVING OR SPEAKING SO SLOWLY THAT OTHER PEOPLE COULD HAVE NOTICED. OR THE OPPOSITE, BEING SO FIGETY OR RESTLESS THAT YOU HAVE BEEN MOVING AROUND A LOT MORE THAN USUAL: NOT AT ALL
SUM OF ALL RESPONSES TO PHQ QUESTIONS 1-9: 0
3. TROUBLE FALLING OR STAYING ASLEEP: NOT AT ALL
9. THOUGHTS THAT YOU WOULD BE BETTER OFF DEAD, OR OF HURTING YOURSELF: NOT AT ALL
6. FEELING BAD ABOUT YOURSELF - OR THAT YOU ARE A FAILURE OR HAVE LET YOURSELF OR YOUR FAMILY DOWN: NOT AT ALL
4. FEELING TIRED OR HAVING LITTLE ENERGY: NOT AT ALL
5. POOR APPETITE OR OVEREATING: NOT AT ALL
1. LITTLE INTEREST OR PLEASURE IN DOING THINGS: NOT AT ALL
10. IF YOU CHECKED OFF ANY PROBLEMS, HOW DIFFICULT HAVE THESE PROBLEMS MADE IT FOR YOU TO DO YOUR WORK, TAKE CARE OF THINGS AT HOME, OR GET ALONG WITH OTHER PEOPLE: NOT DIFFICULT AT ALL
7. TROUBLE CONCENTRATING ON THINGS, SUCH AS READING THE NEWSPAPER OR WATCHING TELEVISION: NOT AT ALL
SUM OF ALL RESPONSES TO PHQ QUESTIONS 1-9: 0
SUM OF ALL RESPONSES TO PHQ QUESTIONS 1-9: 0

## 2024-07-02 ASSESSMENT — ENCOUNTER SYMPTOMS
EYES NEGATIVE: 1
GASTROINTESTINAL NEGATIVE: 1
ALLERGIC/IMMUNOLOGIC NEGATIVE: 1
SHORTNESS OF BREATH: 1

## 2024-07-02 NOTE — PATIENT INSTRUCTIONS
SURVEY:     You may be receiving a survey from [facility] [employer] regarding your visit today.     Please complete the survey to enable us to provide the highest quality of care to you and your family.     If you cannot score us a very good on any question, please call the office to discuss how we could have made your experience a very good one.     Thank you,    Jaison Rothman, APRN-CNP  Kathie Jacobson, APRN-CNP  Elaine, LPN  Sarah, CMA  Rudolph, CMA  Rosmery, CMA  Cookie, PCA  Casie, CMA  Nithya, PM

## 2024-07-02 NOTE — PROGRESS NOTES
not currently use alcohol.  Drug Use:  reports no history of drug use.    Family History:     Family History   Problem Relation Age of Onset    Other Mother     Diabetes Father     Heart Disease Father 30    Depression Father     Heart Attack Father     High Blood Pressure Father     Mental Illness Father     Obesity Father     Osteoarthritis Father     Cancer Maternal Grandfather     Cancer Maternal Grandmother     Rheum Arthritis Brother        Review of Systems:     Positive and Negative as described in HPI    Review of Systems   Constitutional:  Positive for fatigue.   HENT: Negative.     Eyes: Negative.    Respiratory:  Positive for shortness of breath.    Cardiovascular:  Positive for chest pain (Intermittent) and leg swelling (Intermittent).   Gastrointestinal: Negative.    Endocrine: Negative.    Genitourinary: Negative.    Musculoskeletal:  Positive for arthralgias (left shoulder pain).   Skin: Negative.    Allergic/Immunologic: Negative.    Neurological: Negative.    Hematological: Negative.    Psychiatric/Behavioral: Negative.         Physical Exam:   Vitals:  /84   Pulse 86   Temp 98.4 °F (36.9 °C) (Temporal)   Resp 16   Wt 116.8 kg (257 lb 6.4 oz)   LMP 07/27/2020   SpO2 95%   BMI 50.27 kg/m²     Physical Exam  Vitals and nursing note reviewed.   Constitutional:       General: She is not in acute distress.     Appearance: Normal appearance. She is obese. She is not ill-appearing.   HENT:      Head: Normocephalic.      Right Ear: External ear normal.      Left Ear: External ear normal.      Nose: Nose normal.      Mouth/Throat:      Mouth: Mucous membranes are moist.      Pharynx: Oropharynx is clear.   Eyes:      Extraocular Movements: Extraocular movements intact.      Conjunctiva/sclera: Conjunctivae normal.      Pupils: Pupils are equal, round, and reactive to light.   Cardiovascular:      Rate and Rhythm: Normal rate and regular rhythm.      Heart sounds: Normal heart sounds. No murmur

## 2024-07-03 ENCOUNTER — TELEPHONE (OUTPATIENT)
Dept: PRIMARY CARE CLINIC | Age: 54
End: 2024-07-03

## 2024-07-03 NOTE — TELEPHONE ENCOUNTER
See message for denial of Mounjaro below.            Coverage is provided when the member meets all the followin.Coverage is provided when the member has a history of at least 120 days of therapy with TWO more preferred (medication covered by the Plan) medications [ONE of the 120 day trials must be Byetta (5 mcg and 10 mcg), Victoza (18 MG/3 ML PEN) or Trulicity (0.75 mg, 1.5 mg, 3 mg and 4.5 mg)], which include but are not limited to: Farxiga 5 and 10 mg, Invokana 100 mg and 300 mg, Victoza 18 MG/3 ML PEN, and Jardiance 10 and 25 mg. 2.Member has had an inadequate clinical response (the inability to reach A1C goal (less than 7%) (a test result that shows a three-month average of blood sugars) after at least 120 days of current regimen, with use of two or more drugs concomitantly (at the same time) per ADA guidelines (American Diabetes Association) and, 3.Member has documented adherence (taking medications correctly) and appropriate dose escalation (must achieve maximum recommended dose or document that maximum recommended dose is not tolerated or is clinically inappropriate) and, 4.Documentation includes a patient specific A1C goal if less than 7% and must include current A1C (within the last 6 months). The [facility] Policy for Medical Necessity as posted on the [facility] website and [facility] Preferred Drug List criteria were reviewed and per [place] Administrative Code Rule 5160-1-01 (C) and  (B), a medically necessary service must include: generally accepted standards of medical practice, be clinically appropriate in administration, treatment and outcome and be the lowest cost alternative to effectively treat the condition. Please contact your provider to assist you with other treatment options that might be covered under your benefit package, or other services that might be available through the community.

## 2024-07-08 ENCOUNTER — TELEPHONE (OUTPATIENT)
Dept: PRIMARY CARE CLINIC | Age: 54
End: 2024-07-08

## 2024-07-08 ENCOUNTER — HOSPITAL ENCOUNTER (OUTPATIENT)
Age: 54
Discharge: HOME | End: 2024-07-08
Payer: COMMERCIAL

## 2024-07-08 ENCOUNTER — HOSPITAL ENCOUNTER (OUTPATIENT)
Age: 54
Discharge: HOME | End: 2024-07-10
Payer: COMMERCIAL

## 2024-07-08 ENCOUNTER — HOSPITAL ENCOUNTER (OUTPATIENT)
Dept: GENERAL RADIOLOGY | Age: 54
Discharge: HOME | End: 2024-07-10
Payer: COMMERCIAL

## 2024-07-08 DIAGNOSIS — M25.512 ACUTE PAIN OF LEFT SHOULDER: ICD-10-CM

## 2024-07-08 DIAGNOSIS — E11.9 TYPE 2 DIABETES MELLITUS WITHOUT COMPLICATION, WITHOUT LONG-TERM CURRENT USE OF INSULIN (HCC): ICD-10-CM

## 2024-07-08 DIAGNOSIS — R06.02 SHORTNESS OF BREATH: ICD-10-CM

## 2024-07-08 DIAGNOSIS — R60.0 PERIPHERAL EDEMA: ICD-10-CM

## 2024-07-08 DIAGNOSIS — E11.9 TYPE 2 DIABETES MELLITUS WITHOUT COMPLICATION, WITHOUT LONG-TERM CURRENT USE OF INSULIN (HCC): Primary | ICD-10-CM

## 2024-07-08 DIAGNOSIS — R53.83 OTHER FATIGUE: ICD-10-CM

## 2024-07-08 DIAGNOSIS — E78.1 HIGH TRIGLYCERIDES: ICD-10-CM

## 2024-07-08 LAB
ALBUMIN SERPL-MCNC: 3.7 G/DL (ref 3.5–5.2)
ALBUMIN/GLOB SERPL: 1.1 {RATIO} (ref 1–2.5)
ALP SERPL-CCNC: 105 U/L (ref 35–104)
ALT SERPL-CCNC: 43 U/L (ref 5–33)
ANION GAP SERPL CALCULATED.3IONS-SCNC: 12 MMOL/L (ref 9–17)
AST SERPL-CCNC: 32 U/L
BASOPHILS # BLD: 0.09 K/UL (ref 0–0.2)
BASOPHILS NFR BLD: 1 % (ref 0–2)
BILIRUB SERPL-MCNC: 0.3 MG/DL (ref 0.3–1.2)
BUN SERPL-MCNC: 10 MG/DL (ref 6–20)
BUN/CREAT SERPL: 13 (ref 9–20)
CALCIUM SERPL-MCNC: 8.9 MG/DL (ref 8.6–10.4)
CHLORIDE SERPL-SCNC: 100 MMOL/L (ref 98–107)
CHOLEST SERPL-MCNC: 162 MG/DL (ref 0–199)
CHOLESTEROL/HDL RATIO: 3
CO2 SERPL-SCNC: 21 MMOL/L (ref 20–31)
CREAT SERPL-MCNC: 0.8 MG/DL (ref 0.5–0.9)
EOSINOPHIL # BLD: 0.18 K/UL (ref 0–0.44)
EOSINOPHILS RELATIVE PERCENT: 2 % (ref 1–4)
ERYTHROCYTE [DISTWIDTH] IN BLOOD BY AUTOMATED COUNT: 14.3 % (ref 11.8–14.4)
GFR, ESTIMATED: 89 ML/MIN/1.73M2
GLUCOSE P FAST SERPL-MCNC: 101 MG/DL (ref 70–99)
HCT VFR BLD AUTO: 53.6 % (ref 36.3–47.1)
HDLC SERPL-MCNC: 47 MG/DL
HGB BLD-MCNC: 17.9 G/DL (ref 11.9–15.1)
IMM GRANULOCYTES # BLD AUTO: 0.05 K/UL (ref 0–0.3)
IMM GRANULOCYTES NFR BLD: 1 %
LDLC SERPL CALC-MCNC: 88 MG/DL (ref 0–100)
LYMPHOCYTES NFR BLD: 2.36 K/UL (ref 1.1–3.7)
LYMPHOCYTES RELATIVE PERCENT: 30 % (ref 24–43)
MCH RBC QN AUTO: 31.7 PG (ref 25.2–33.5)
MCHC RBC AUTO-ENTMCNC: 33.4 G/DL (ref 28.4–34.8)
MCV RBC AUTO: 94.9 FL (ref 82.6–102.9)
MONOCYTES NFR BLD: 0.8 K/UL (ref 0.1–1.2)
MONOCYTES NFR BLD: 10 % (ref 3–12)
NEUTROPHILS NFR BLD: 55 % (ref 36–65)
NEUTS SEG NFR BLD: 4.29 K/UL (ref 1.5–8.1)
NRBC BLD-RTO: 0 PER 100 WBC
PLATELET # BLD AUTO: 278 K/UL (ref 138–453)
PMV BLD AUTO: 10.4 FL (ref 8.1–13.5)
POTASSIUM SERPL-SCNC: 5.2 MMOL/L (ref 3.7–5.3)
PROT SERPL-MCNC: 7.2 G/DL (ref 6.4–8.3)
RBC # BLD AUTO: 5.65 M/UL (ref 3.95–5.11)
SODIUM SERPL-SCNC: 133 MMOL/L (ref 135–144)
TRIGL SERPL-MCNC: 135 MG/DL
TSH SERPL DL<=0.05 MIU/L-ACNC: 1.44 UIU/ML (ref 0.3–5)
VLDLC SERPL CALC-MCNC: 27 MG/DL
WBC OTHER # BLD: 7.8 K/UL (ref 3.5–11.3)

## 2024-07-08 PROCEDURE — 71046 X-RAY EXAM CHEST 2 VIEWS: CPT

## 2024-07-08 PROCEDURE — 80053 COMPREHEN METABOLIC PANEL: CPT

## 2024-07-08 PROCEDURE — 73030 X-RAY EXAM OF SHOULDER: CPT

## 2024-07-08 PROCEDURE — 85025 COMPLETE CBC W/AUTO DIFF WBC: CPT

## 2024-07-08 PROCEDURE — 36415 COLL VENOUS BLD VENIPUNCTURE: CPT

## 2024-07-08 PROCEDURE — 84443 ASSAY THYROID STIM HORMONE: CPT

## 2024-07-08 PROCEDURE — 80061 LIPID PANEL: CPT

## 2024-07-08 RX ORDER — DULAGLUTIDE 0.75 MG/.5ML
0.75 INJECTION, SOLUTION SUBCUTANEOUS WEEKLY
Qty: 2 ML | Refills: 1 | Status: SHIPPED | OUTPATIENT
Start: 2024-07-08 | End: 2024-08-07

## 2024-07-08 NOTE — TELEPHONE ENCOUNTER
----- Message from REBEKAH Mak - CNP sent at 7/8/2024  3:04 PM EDT -----  Please notify patient of xray results do not show any worsening symptoms and no heart enlargement.  Thanks Kathie

## 2024-07-09 ENCOUNTER — TELEPHONE (OUTPATIENT)
Dept: PRIMARY CARE CLINIC | Age: 54
End: 2024-07-09

## 2024-07-09 DIAGNOSIS — R74.8 ELEVATED LIVER ENZYMES: Primary | ICD-10-CM

## 2024-07-09 NOTE — TELEPHONE ENCOUNTER
----- Message from REBEKAH Mak - CNP sent at 7/9/2024  2:09 PM EDT -----  Please notify patient of stable lab results.  Her liver enzymes are slightly elevated.  Ask her if she is drinking alcohol or taking OTC NSAIDS.  She is on the Mobic and we will continue to monitor.  Shoulder xray was negative.  If no improvement with PT will refer to Orthopedics.  Thanks Kathie

## 2024-07-09 NOTE — TELEPHONE ENCOUNTER
Patient notified and verbalized understanding. Patient did mention she does take Ibuprofen daily.

## 2024-07-12 ENCOUNTER — OFFICE VISIT (OUTPATIENT)
Dept: CARDIOLOGY | Age: 54
End: 2024-07-12

## 2024-07-12 VITALS
SYSTOLIC BLOOD PRESSURE: 128 MMHG | DIASTOLIC BLOOD PRESSURE: 74 MMHG | BODY MASS INDEX: 49.75 KG/M2 | HEART RATE: 86 BPM | OXYGEN SATURATION: 92 % | HEIGHT: 60 IN | RESPIRATION RATE: 18 BRPM | WEIGHT: 253.4 LBS

## 2024-07-12 DIAGNOSIS — I10 PRIMARY HYPERTENSION: ICD-10-CM

## 2024-07-12 DIAGNOSIS — D75.839 THROMBOCYTOSIS: ICD-10-CM

## 2024-07-12 DIAGNOSIS — R53.83 OTHER FATIGUE: ICD-10-CM

## 2024-07-12 DIAGNOSIS — G47.33 OSA (OBSTRUCTIVE SLEEP APNEA): ICD-10-CM

## 2024-07-12 DIAGNOSIS — R06.02 SOB (SHORTNESS OF BREATH): Primary | ICD-10-CM

## 2024-07-12 DIAGNOSIS — R73.03 PRE-DIABETES: ICD-10-CM

## 2024-07-12 DIAGNOSIS — Z71.6 TOBACCO ABUSE COUNSELING: ICD-10-CM

## 2024-07-12 DIAGNOSIS — R42 DIZZINESS: ICD-10-CM

## 2024-07-12 DIAGNOSIS — J43.9 PULMONARY EMPHYSEMA, UNSPECIFIED EMPHYSEMA TYPE (HCC): ICD-10-CM

## 2024-07-12 DIAGNOSIS — R07.89 ATYPICAL CHEST PAIN: ICD-10-CM

## 2024-07-12 DIAGNOSIS — R00.2 PALPITATION: ICD-10-CM

## 2024-07-12 RX ORDER — DILTIAZEM HYDROCHLORIDE 180 MG/1
180 CAPSULE, COATED, EXTENDED RELEASE ORAL DAILY
Qty: 90 CAPSULE | Refills: 3 | Status: SHIPPED | OUTPATIENT
Start: 2024-07-12

## 2024-07-12 RX ORDER — ASPIRIN 81 MG/1
81 TABLET ORAL DAILY
Qty: 90 TABLET | Refills: 3 | Status: SHIPPED | OUTPATIENT
Start: 2024-07-12

## 2024-07-12 NOTE — PROGRESS NOTES
I, Mamie Bliss am scribing for and in the presence of Rudi Mora MD, F.A.C.C..    Patient: Yesi Zuluaga  : 1970  Date of Visit: 2024    REASON FOR VISIT / CONSULTATION: Shortness of Breath (HX:SOB pt is here for follow up she feels fatigue, has bad swelling in stomach, legs, face and feet, gets sob and CP, sharp, lasts seconds, come and go, does not travel lightheaded/dizziness denies any falls palp )    History of Present Illness:      Dear Kathie Jacobson, APRN - CNP    I had the pleasure of seeing Yesi Zuluaga, who is a 53 y.o. female with a history of edema and chest pain.    She reports a history of suspected heart failure, she did see a Cardiologist one time in [place] but did not continue to follow up and never received a diagnosis. She does have hypertension. No history of hyperlipidemia. She did have gestational diabetes with all three of her children. No other issues during pregnancy. She does have diabetes last HgA1C was borderline at 5.9 2021. She is a current everyday smoker of 1 PPD for the last 28 years. She has three grown children.     Her family history includes her father had a heart attack and had open heart surgery. He  in his 60's. Her brother has A-Fib.    Holter done 2021: Sinus rhythm with average HR of 97 bpm, ranging between 73 and 141 bpm, sinus tachycardia represnted 34% of the study  duration. Rare isolated PACsand PVCs noted.    Echo done on 2022- EF > 60% No significant left ventricular hypertrophy and with normal left ventricular cavity size. Unable to assess specific wall motion abnormalities due to image quality. An anterior echo free space is seen suggestive of a small effusion or fat pad. Evidence of mild (grade I) diastolic dysfunction is seen.   No previous studies were available for comparison. Other than perhaps mild diastolic dysfunction, no significant structural cause of shortness of breath was identified from this study.

## 2024-08-27 ENCOUNTER — TELEPHONE (OUTPATIENT)
Dept: INTERVENTIONAL RADIOLOGY/VASCULAR | Age: 54
End: 2024-08-27

## 2024-08-27 ENCOUNTER — TELEPHONE (OUTPATIENT)
Dept: CARDIOLOGY | Age: 54
End: 2024-08-27

## 2024-08-27 NOTE — TELEPHONE ENCOUNTER
Ms. Zuluaga was at appt to have CTA done and her HR was 80-90 and protocol is HR needs to be under 60.

## 2024-08-28 ENCOUNTER — APPOINTMENT (OUTPATIENT)
Dept: GENERAL RADIOLOGY | Age: 54
DRG: 140 | End: 2024-08-28
Payer: COMMERCIAL

## 2024-08-28 ENCOUNTER — HOSPITAL ENCOUNTER (INPATIENT)
Age: 54
LOS: 2 days | Discharge: HOME | DRG: 140 | End: 2024-08-30
Attending: EMERGENCY MEDICINE | Admitting: STUDENT IN AN ORGANIZED HEALTH CARE EDUCATION/TRAINING PROGRAM
Payer: COMMERCIAL

## 2024-08-28 DIAGNOSIS — I50.23 ACUTE ON CHRONIC SYSTOLIC CONGESTIVE HEART FAILURE (HCC): ICD-10-CM

## 2024-08-28 DIAGNOSIS — I20.0 UNSTABLE ANGINA PECTORIS (HCC): ICD-10-CM

## 2024-08-28 DIAGNOSIS — R07.9 CHEST PAIN, UNSPECIFIED: ICD-10-CM

## 2024-08-28 DIAGNOSIS — R07.9 CHEST PAIN, UNSPECIFIED TYPE: ICD-10-CM

## 2024-08-28 DIAGNOSIS — J44.1 COPD EXACERBATION (HCC): ICD-10-CM

## 2024-08-28 DIAGNOSIS — J96.01 ACUTE HYPOXIC RESPIRATORY FAILURE (HCC): Primary | ICD-10-CM

## 2024-08-28 DIAGNOSIS — R06.02 SHORTNESS OF BREATH: ICD-10-CM

## 2024-08-28 PROBLEM — E87.70 FLUID OVERLOAD: Status: ACTIVE | Noted: 2024-08-28

## 2024-08-28 LAB
ANION GAP SERPL CALCULATED.3IONS-SCNC: 12 MMOL/L (ref 9–16)
ARTERIAL PATENCY WRIST A: YES
BASOPHILS # BLD: 0.07 K/UL (ref 0–0.2)
BASOPHILS NFR BLD: 1 % (ref 0–2)
BDY SITE: ABNORMAL
BNP SERPL-MCNC: 154 PG/ML (ref 0–125)
BODY TEMPERATURE: 37
BUN SERPL-MCNC: 8 MG/DL (ref 6–20)
BUN/CREAT SERPL: 10 (ref 9–20)
CALCIUM SERPL-MCNC: 9.6 MG/DL (ref 8.6–10.4)
CHLORIDE SERPL-SCNC: 104 MMOL/L (ref 98–107)
CO2 SERPL-SCNC: 22 MMOL/L (ref 20–31)
CREAT SERPL-MCNC: 0.8 MG/DL (ref 0.5–0.9)
EKG ATRIAL RATE: 93 BPM
EKG P AXIS: 56 DEGREES
EKG P-R INTERVAL: 136 MS
EKG Q-T INTERVAL: 346 MS
EKG QRS DURATION: 82 MS
EKG QTC CALCULATION (BAZETT): 430 MS
EKG R AXIS: 156 DEGREES
EKG T AXIS: 36 DEGREES
EKG VENTRICULAR RATE: 93 BPM
EOSINOPHIL # BLD: 0.21 K/UL (ref 0–0.44)
EOSINOPHILS RELATIVE PERCENT: 1 % (ref 1–4)
ERYTHROCYTE [DISTWIDTH] IN BLOOD BY AUTOMATED COUNT: 13.6 % (ref 11.8–14.4)
FIO2 ON VENT: 36 %
GAS FLOW.O2 O2 DELIVERY SYS: ABNORMAL L/MIN
GFR, ESTIMATED: 89 ML/MIN/1.73M2
GLUCOSE BLD-MCNC: 122 MG/DL (ref 74–100)
GLUCOSE SERPL-MCNC: 93 MG/DL (ref 74–99)
HCO3 ARTERIAL: 23.8 MMOL/L (ref 22–26)
HCT VFR BLD AUTO: 54.6 % (ref 36.3–47.1)
HGB BLD-MCNC: 18.2 G/DL (ref 11.9–15.1)
IMM GRANULOCYTES # BLD AUTO: 0.07 K/UL (ref 0–0.3)
IMM GRANULOCYTES NFR BLD: 1 %
INR PPP: 1
LYMPHOCYTES NFR BLD: 2.71 K/UL (ref 1.1–3.7)
LYMPHOCYTES RELATIVE PERCENT: 18 % (ref 24–43)
MAGNESIUM SERPL-MCNC: 1.8 MG/DL (ref 1.6–2.6)
MCH RBC QN AUTO: 31.3 PG (ref 25.2–33.5)
MCHC RBC AUTO-ENTMCNC: 33.3 G/DL (ref 28.4–34.8)
MCV RBC AUTO: 93.8 FL (ref 82.6–102.9)
MONOCYTES NFR BLD: 1.28 K/UL (ref 0.1–1.2)
MONOCYTES NFR BLD: 9 % (ref 3–12)
NEGATIVE BASE EXCESS, ART: 1.2 MMOL/L (ref 0–2)
NEUTROPHILS NFR BLD: 71 % (ref 36–65)
NEUTS SEG NFR BLD: 10.51 K/UL (ref 1.5–8.1)
NRBC BLD-RTO: 0 PER 100 WBC
O2 SAT, ARTERIAL: 93.8 % (ref 95–98)
PCO2 ARTERIAL: 40.6 MMHG (ref 35–45)
PCO2, ART, TEMP ADJ: 40.6 (ref 35–45)
PH ARTERIAL: 7.38 (ref 7.35–7.45)
PH, ART, TEMP ADJ: 7.38 (ref 7.35–7.45)
PLATELET # BLD AUTO: 305 K/UL (ref 138–453)
PMV BLD AUTO: 9.8 FL (ref 8.1–13.5)
PO2 ARTERIAL: 69.7 MMHG (ref 80–100)
PO2, ART, TEMP ADJ: 69.7 MMHG (ref 80–100)
POTASSIUM SERPL-SCNC: 4.3 MMOL/L (ref 3.7–5.3)
PROTHROMBIN TIME: 12.5 SEC (ref 11.7–14.1)
RBC # BLD AUTO: 5.82 M/UL (ref 3.95–5.11)
SODIUM SERPL-SCNC: 138 MMOL/L (ref 136–145)
TROPONIN I SERPL HS-MCNC: <6 NG/L (ref 0–14)
WBC OTHER # BLD: 14.9 K/UL (ref 3.5–11.3)

## 2024-08-28 PROCEDURE — 6360000002 HC RX W HCPCS: Performed by: EMERGENCY MEDICINE

## 2024-08-28 PROCEDURE — 73130 X-RAY EXAM OF HAND: CPT

## 2024-08-28 PROCEDURE — 94640 AIRWAY INHALATION TREATMENT: CPT

## 2024-08-28 PROCEDURE — 94664 DEMO&/EVAL PT USE INHALER: CPT

## 2024-08-28 PROCEDURE — 6360000002 HC RX W HCPCS: Performed by: INTERNAL MEDICINE

## 2024-08-28 PROCEDURE — 2580000003 HC RX 258: Performed by: INTERNAL MEDICINE

## 2024-08-28 PROCEDURE — 2580000003 HC RX 258: Performed by: EMERGENCY MEDICINE

## 2024-08-28 PROCEDURE — 96372 THER/PROPH/DIAG INJ SC/IM: CPT

## 2024-08-28 PROCEDURE — 6370000000 HC RX 637 (ALT 250 FOR IP): Performed by: INTERNAL MEDICINE

## 2024-08-28 PROCEDURE — 85025 COMPLETE CBC W/AUTO DIFF WBC: CPT

## 2024-08-28 PROCEDURE — G0378 HOSPITAL OBSERVATION PER HR: HCPCS

## 2024-08-28 PROCEDURE — 99285 EMERGENCY DEPT VISIT HI MDM: CPT

## 2024-08-28 PROCEDURE — 82947 ASSAY GLUCOSE BLOOD QUANT: CPT

## 2024-08-28 PROCEDURE — 96375 TX/PRO/DX INJ NEW DRUG ADDON: CPT

## 2024-08-28 PROCEDURE — 83735 ASSAY OF MAGNESIUM: CPT

## 2024-08-28 PROCEDURE — 6370000000 HC RX 637 (ALT 250 FOR IP): Performed by: EMERGENCY MEDICINE

## 2024-08-28 PROCEDURE — 80048 BASIC METABOLIC PNL TOTAL CA: CPT

## 2024-08-28 PROCEDURE — 93010 ELECTROCARDIOGRAM REPORT: CPT | Performed by: INTERNAL MEDICINE

## 2024-08-28 PROCEDURE — 83880 ASSAY OF NATRIURETIC PEPTIDE: CPT

## 2024-08-28 PROCEDURE — 2700000000 HC OXYGEN THERAPY PER DAY

## 2024-08-28 PROCEDURE — 93005 ELECTROCARDIOGRAM TRACING: CPT | Performed by: EMERGENCY MEDICINE

## 2024-08-28 PROCEDURE — 96374 THER/PROPH/DIAG INJ IV PUSH: CPT

## 2024-08-28 PROCEDURE — 1200000000 HC SEMI PRIVATE

## 2024-08-28 PROCEDURE — 84484 ASSAY OF TROPONIN QUANT: CPT

## 2024-08-28 PROCEDURE — 36600 WITHDRAWAL OF ARTERIAL BLOOD: CPT

## 2024-08-28 PROCEDURE — 71045 X-RAY EXAM CHEST 1 VIEW: CPT

## 2024-08-28 PROCEDURE — 82805 BLOOD GASES W/O2 SATURATION: CPT

## 2024-08-28 PROCEDURE — 85610 PROTHROMBIN TIME: CPT

## 2024-08-28 PROCEDURE — 94761 N-INVAS EAR/PLS OXIMETRY MLT: CPT

## 2024-08-28 RX ORDER — ASPIRIN 81 MG/1
81 TABLET ORAL DAILY
Status: DISCONTINUED | OUTPATIENT
Start: 2024-08-28 | End: 2024-08-30 | Stop reason: HOSPADM

## 2024-08-28 RX ORDER — ONDANSETRON 4 MG/1
4 TABLET, ORALLY DISINTEGRATING ORAL EVERY 8 HOURS PRN
Status: DISCONTINUED | OUTPATIENT
Start: 2024-08-28 | End: 2024-08-30 | Stop reason: HOSPADM

## 2024-08-28 RX ORDER — FUROSEMIDE 10 MG/ML
40 INJECTION INTRAMUSCULAR; INTRAVENOUS ONCE
Status: COMPLETED | OUTPATIENT
Start: 2024-08-28 | End: 2024-08-28

## 2024-08-28 RX ORDER — ONDANSETRON 2 MG/ML
4 INJECTION INTRAMUSCULAR; INTRAVENOUS EVERY 6 HOURS PRN
Status: DISCONTINUED | OUTPATIENT
Start: 2024-08-28 | End: 2024-08-30 | Stop reason: HOSPADM

## 2024-08-28 RX ORDER — HYDROXYZINE HYDROCHLORIDE 50 MG/1
50 TABLET, FILM COATED ORAL
Status: DISCONTINUED | OUTPATIENT
Start: 2024-08-28 | End: 2024-08-30 | Stop reason: HOSPADM

## 2024-08-28 RX ORDER — INSULIN LISPRO 100 [IU]/ML
0-4 INJECTION, SOLUTION INTRAVENOUS; SUBCUTANEOUS NIGHTLY
Status: DISCONTINUED | OUTPATIENT
Start: 2024-08-28 | End: 2024-08-30 | Stop reason: HOSPADM

## 2024-08-28 RX ORDER — ARIPIPRAZOLE 5 MG/1
30 TABLET ORAL ONCE
Status: DISCONTINUED | OUTPATIENT
Start: 2024-08-28 | End: 2024-08-30 | Stop reason: HOSPADM

## 2024-08-28 RX ORDER — BACLOFEN 10 MG/1
10 TABLET ORAL 3 TIMES DAILY
Status: DISCONTINUED | OUTPATIENT
Start: 2024-08-28 | End: 2024-08-30 | Stop reason: HOSPADM

## 2024-08-28 RX ORDER — ACETAMINOPHEN 325 MG/1
650 TABLET ORAL EVERY 6 HOURS PRN
Status: DISCONTINUED | OUTPATIENT
Start: 2024-08-28 | End: 2024-08-30 | Stop reason: SDUPTHER

## 2024-08-28 RX ORDER — ALBUTEROL SULFATE 0.83 MG/ML
2.5 SOLUTION RESPIRATORY (INHALATION) EVERY 4 HOURS PRN
Status: DISCONTINUED | OUTPATIENT
Start: 2024-08-28 | End: 2024-08-30 | Stop reason: HOSPADM

## 2024-08-28 RX ORDER — POTASSIUM CHLORIDE 1500 MG/1
40 TABLET, EXTENDED RELEASE ORAL PRN
Status: DISCONTINUED | OUTPATIENT
Start: 2024-08-28 | End: 2024-08-30 | Stop reason: HOSPADM

## 2024-08-28 RX ORDER — GABAPENTIN 300 MG/1
300 CAPSULE ORAL 3 TIMES DAILY
Status: DISCONTINUED | OUTPATIENT
Start: 2024-08-28 | End: 2024-08-30 | Stop reason: HOSPADM

## 2024-08-28 RX ORDER — POTASSIUM CHLORIDE 7.45 MG/ML
10 INJECTION INTRAVENOUS PRN
Status: DISCONTINUED | OUTPATIENT
Start: 2024-08-28 | End: 2024-08-30 | Stop reason: HOSPADM

## 2024-08-28 RX ORDER — ARIPIPRAZOLE 5 MG/1
30 TABLET ORAL DAILY
Status: DISCONTINUED | OUTPATIENT
Start: 2024-08-29 | End: 2024-08-30 | Stop reason: HOSPADM

## 2024-08-28 RX ORDER — SODIUM CHLORIDE 9 MG/ML
INJECTION, SOLUTION INTRAVENOUS PRN
Status: DISCONTINUED | OUTPATIENT
Start: 2024-08-28 | End: 2024-08-30 | Stop reason: SDUPTHER

## 2024-08-28 RX ORDER — SPIRONOLACTONE 25 MG/1
25 TABLET ORAL DAILY
Status: DISCONTINUED | OUTPATIENT
Start: 2024-08-28 | End: 2024-08-30 | Stop reason: HOSPADM

## 2024-08-28 RX ORDER — DILTIAZEM HYDROCHLORIDE 180 MG/1
180 CAPSULE, COATED, EXTENDED RELEASE ORAL DAILY
Status: DISCONTINUED | OUTPATIENT
Start: 2024-08-28 | End: 2024-08-30 | Stop reason: HOSPADM

## 2024-08-28 RX ORDER — ACETAMINOPHEN 650 MG/1
650 SUPPOSITORY RECTAL EVERY 6 HOURS PRN
Status: DISCONTINUED | OUTPATIENT
Start: 2024-08-28 | End: 2024-08-30 | Stop reason: HOSPADM

## 2024-08-28 RX ORDER — CARVEDILOL 6.25 MG/1
6.25 TABLET ORAL 2 TIMES DAILY
Status: DISCONTINUED | OUTPATIENT
Start: 2024-08-28 | End: 2024-08-30 | Stop reason: HOSPADM

## 2024-08-28 RX ORDER — IPRATROPIUM BROMIDE AND ALBUTEROL SULFATE 2.5; .5 MG/3ML; MG/3ML
1 SOLUTION RESPIRATORY (INHALATION)
Status: DISCONTINUED | OUTPATIENT
Start: 2024-08-29 | End: 2024-08-30 | Stop reason: HOSPADM

## 2024-08-28 RX ORDER — BUDESONIDE AND FORMOTEROL FUMARATE DIHYDRATE 80; 4.5 UG/1; UG/1
2 AEROSOL RESPIRATORY (INHALATION)
Status: DISCONTINUED | OUTPATIENT
Start: 2024-08-28 | End: 2024-08-30 | Stop reason: HOSPADM

## 2024-08-28 RX ORDER — DEXTROSE MONOHYDRATE 100 MG/ML
INJECTION, SOLUTION INTRAVENOUS CONTINUOUS PRN
Status: DISCONTINUED | OUTPATIENT
Start: 2024-08-28 | End: 2024-08-30 | Stop reason: HOSPADM

## 2024-08-28 RX ORDER — SODIUM CHLORIDE 0.9 % (FLUSH) 0.9 %
10 SYRINGE (ML) INJECTION PRN
Status: DISCONTINUED | OUTPATIENT
Start: 2024-08-28 | End: 2024-08-30 | Stop reason: SDUPTHER

## 2024-08-28 RX ORDER — IPRATROPIUM BROMIDE AND ALBUTEROL SULFATE 2.5; .5 MG/3ML; MG/3ML
1 SOLUTION RESPIRATORY (INHALATION) ONCE
Status: COMPLETED | OUTPATIENT
Start: 2024-08-28 | End: 2024-08-28

## 2024-08-28 RX ORDER — IPRATROPIUM BROMIDE AND ALBUTEROL SULFATE 2.5; .5 MG/3ML; MG/3ML
1 SOLUTION RESPIRATORY (INHALATION)
Status: DISCONTINUED | OUTPATIENT
Start: 2024-08-28 | End: 2024-08-28

## 2024-08-28 RX ORDER — POLYETHYLENE GLYCOL 3350 17 G/17G
17 POWDER, FOR SOLUTION ORAL DAILY PRN
Status: DISCONTINUED | OUTPATIENT
Start: 2024-08-28 | End: 2024-08-30 | Stop reason: HOSPADM

## 2024-08-28 RX ORDER — MAGNESIUM SULFATE IN WATER 40 MG/ML
2000 INJECTION, SOLUTION INTRAVENOUS PRN
Status: DISCONTINUED | OUTPATIENT
Start: 2024-08-28 | End: 2024-08-30 | Stop reason: HOSPADM

## 2024-08-28 RX ORDER — GLUCAGON 1 MG/ML
1 KIT INJECTION PRN
Status: DISCONTINUED | OUTPATIENT
Start: 2024-08-28 | End: 2024-08-30 | Stop reason: HOSPADM

## 2024-08-28 RX ORDER — INSULIN LISPRO 100 [IU]/ML
0-4 INJECTION, SOLUTION INTRAVENOUS; SUBCUTANEOUS
Status: DISCONTINUED | OUTPATIENT
Start: 2024-08-29 | End: 2024-08-30 | Stop reason: HOSPADM

## 2024-08-28 RX ORDER — ENOXAPARIN SODIUM 100 MG/ML
30 INJECTION SUBCUTANEOUS 2 TIMES DAILY
Status: DISCONTINUED | OUTPATIENT
Start: 2024-08-28 | End: 2024-08-30 | Stop reason: HOSPADM

## 2024-08-28 RX ORDER — SODIUM CHLORIDE 0.9 % (FLUSH) 0.9 %
5-40 SYRINGE (ML) INJECTION EVERY 12 HOURS SCHEDULED
Status: DISCONTINUED | OUTPATIENT
Start: 2024-08-28 | End: 2024-08-30 | Stop reason: SDUPTHER

## 2024-08-28 RX ADMIN — FUROSEMIDE 40 MG: 10 INJECTION, SOLUTION INTRAMUSCULAR; INTRAVENOUS at 18:23

## 2024-08-28 RX ADMIN — CARVEDILOL 6.25 MG: 6.25 TABLET, FILM COATED ORAL at 21:49

## 2024-08-28 RX ADMIN — IPRATROPIUM BROMIDE AND ALBUTEROL SULFATE 1 DOSE: 2.5; .5 SOLUTION RESPIRATORY (INHALATION) at 19:28

## 2024-08-28 RX ADMIN — GABAPENTIN 300 MG: 300 CAPSULE ORAL at 21:49

## 2024-08-28 RX ADMIN — SODIUM CHLORIDE, PRESERVATIVE FREE 10 ML: 5 INJECTION INTRAVENOUS at 21:49

## 2024-08-28 RX ADMIN — WATER 125 MG: 1 INJECTION INTRAMUSCULAR; INTRAVENOUS; SUBCUTANEOUS at 18:10

## 2024-08-28 RX ADMIN — HYDROXYZINE HYDROCHLORIDE 50 MG: 50 TABLET, FILM COATED ORAL at 21:57

## 2024-08-28 RX ADMIN — ENOXAPARIN SODIUM 30 MG: 100 INJECTION SUBCUTANEOUS at 21:58

## 2024-08-28 RX ADMIN — BACLOFEN 10 MG: 10 TABLET ORAL at 21:49

## 2024-08-28 RX ADMIN — IPRATROPIUM BROMIDE AND ALBUTEROL SULFATE 1 DOSE: .5; 3 SOLUTION RESPIRATORY (INHALATION) at 16:58

## 2024-08-28 RX ADMIN — BUDESONIDE AND FORMOTEROL FUMARATE DIHYDRATE 2 PUFF: 80; 4.5 AEROSOL RESPIRATORY (INHALATION) at 21:11

## 2024-08-28 ASSESSMENT — LIFESTYLE VARIABLES
HOW OFTEN DO YOU HAVE A DRINK CONTAINING ALCOHOL: NEVER
HOW MANY STANDARD DRINKS CONTAINING ALCOHOL DO YOU HAVE ON A TYPICAL DAY: PATIENT DOES NOT DRINK

## 2024-08-28 ASSESSMENT — ENCOUNTER SYMPTOMS
SHORTNESS OF BREATH: 1
SORE THROAT: 0
BACK PAIN: 0
ABDOMINAL DISTENTION: 0

## 2024-08-28 NOTE — ED PROVIDER NOTES
Vaping status: Never Used   Substance and Sexual Activity    Alcohol use: Not Currently    Drug use: Never    Sexual activity: Not Currently     Partners: Male     Social Determinants of Health     Financial Resource Strain: Medium Risk (3/26/2024)    Overall Financial Resource Strain (CARDIA)     Difficulty of Paying Living Expenses: Somewhat hard   Food Insecurity: Food Insecurity Present (3/26/2024)    Hunger Vital Sign     Worried About Running Out of Food in the Last Year: Sometimes true     Ran Out of Food in the Last Year: Sometimes true   Transportation Needs: Unmet Transportation Needs (3/26/2024)    PRAPARE - Transportation     Lack of Transportation (Non-Medical): Yes   Housing Stability: Unknown (3/26/2024)    Housing Stability Vital Sign     Unstable Housing in the Last Year: No       SCREENINGS        Chuckie Coma Scale  Eye Opening: Spontaneous  Best Verbal Response: Oriented  Best Motor Response: Obeys commands  Chuckie Coma Scale Score: 15               PHYSICAL EXAM    (up to 7 for level 4, 8 or more for level 5)     ED Triage Vitals   BP Systolic BP Percentile Diastolic BP Percentile Temp Temp Source Pulse Respirations SpO2   08/28/24 1618 -- -- 08/28/24 1618 08/28/24 1618 08/28/24 1610 08/28/24 1610 08/28/24 1610   (!) 162/107   98.6 °F (37 °C) Oral 99 25 (!) 80 %      Height Weight         -- --                       Physical Exam  Constitutional:       General: She is not in acute distress.     Appearance: Normal appearance. She is not toxic-appearing.      Comments: Patient is morbidly obese on exam.   HENT:      Head: Normocephalic and atraumatic.      Mouth/Throat:      Mouth: Mucous membranes are moist.   Eyes:      Extraocular Movements: Extraocular movements intact.      Pupils: Pupils are equal, round, and reactive to light.   Cardiovascular:      Rate and Rhythm: Normal rate and regular rhythm.      Pulses: Normal pulses.      Heart sounds: Normal heart sounds.   Pulmonary:      Effort:  Pulmonary effort is normal.      Breath sounds: Examination of the right-upper field reveals wheezing. Examination of the left-upper field reveals wheezing. Examination of the right-middle field reveals wheezing. Examination of the left-middle field reveals wheezing. Examination of the right-lower field reveals wheezing. Examination of the left-lower field reveals wheezing. Wheezing present.   Abdominal:      General: Abdomen is flat. Bowel sounds are normal.      Palpations: Abdomen is soft.   Musculoskeletal:         General: Normal range of motion.      Right lower leg: Edema present.      Left lower leg: Edema present.      Comments: 2+ edema bilateral lower extremities   Skin:     General: Skin is warm and dry.      Capillary Refill: Capillary refill takes less than 2 seconds.   Neurological:      General: No focal deficit present.      Mental Status: She is alert and oriented to person, place, and time.   Psychiatric:         Mood and Affect: Mood normal.         DIAGNOSTIC RESULTS     EKG: All EKG's are interpreted by the Emergency Department Physician who either signs or Co-signs this chart in the absence of a cardiologist.    Normal sinus rhythm with a heart rate of 93.  No acute ST-T wave changes.    RADIOLOGY:   Non-plain film images such as CT, Ultrasound and MRI are read by the radiologist. Plain radiographic images are visualized and preliminarily interpreted by the emergency physician with the below findings:      Interpretation per the Radiologist below, if available at the time of this note:    XR HAND RIGHT (MIN 3 VIEWS)   Final Result   2.5 mm osseous density adjacent to the radial and volar aspect of the head   middle phalanx small finger. This may represent a small avulsion fracture.   Correlate with site of tenderness.         XR CHEST PORTABLE   Final Result   Mild pulmonary vascular congestion.               ED BEDSIDE ULTRASOUND:   Performed by ED Physician - none    LABS:  Labs Reviewed

## 2024-08-28 NOTE — ED NOTES
Patient walked to restroom without assistance or difficulty, upon arrival back to bed patient was at 85% on RA.  Patient placed on 6L NC for approx 2 mins until O2 was at 90% then decreased to 4L NC.  Provider made aware.

## 2024-08-28 NOTE — TELEPHONE ENCOUNTER
Most hospital have protocol to give a low dose IV metoprolol to get the study done. I can order this PRN prior to the procedure. Please have her call to reschedule the study and tell them that. Thank you

## 2024-08-29 ENCOUNTER — APPOINTMENT (OUTPATIENT)
Age: 54
DRG: 140 | End: 2024-08-29
Payer: COMMERCIAL

## 2024-08-29 LAB
ALBUMIN SERPL-MCNC: 3.9 G/DL (ref 3.5–5.2)
ALBUMIN/GLOB SERPL: 1.1 {RATIO} (ref 1–2.5)
ALP SERPL-CCNC: 121 U/L (ref 35–104)
ALT SERPL-CCNC: 26 U/L (ref 10–35)
ANION GAP SERPL CALCULATED.3IONS-SCNC: 13 MMOL/L (ref 9–16)
AST SERPL-CCNC: 19 U/L (ref 10–35)
BASOPHILS # BLD: 0 K/UL (ref 0–0.2)
BASOPHILS NFR BLD: 0 % (ref 0–2)
BILIRUB SERPL-MCNC: 0.4 MG/DL (ref 0–1.2)
BNP SERPL-MCNC: 349 PG/ML (ref 0–125)
BUN SERPL-MCNC: 13 MG/DL (ref 6–20)
BUN/CREAT SERPL: 14 (ref 9–20)
CALCIUM SERPL-MCNC: 9.8 MG/DL (ref 8.6–10.4)
CHLORIDE SERPL-SCNC: 99 MMOL/L (ref 98–107)
CO2 SERPL-SCNC: 23 MMOL/L (ref 20–31)
CREAT SERPL-MCNC: 0.9 MG/DL (ref 0.5–0.9)
CRP SERPL HS-MCNC: 15.6 MG/L (ref 0–5)
ECHO AO ROOT DIAM: 2.5 CM
ECHO AO ROOT INDEX: 1.21 CM/M2
ECHO AV CUSP MM: 2.1 CM
ECHO AV MEAN GRADIENT: 5 MMHG
ECHO AV MEAN VELOCITY: 1.1 M/S
ECHO AV PEAK GRADIENT: 9 MMHG
ECHO AV PEAK VELOCITY: 1.5 M/S
ECHO AV VELOCITY RATIO: 0.67
ECHO AV VTI: 24.9 CM
ECHO BSA: 2.22 M2
ECHO LA AREA 2C: 13.1 CM2
ECHO LA AREA 4C: 17.3 CM2
ECHO LA MAJOR AXIS: 5.4 CM
ECHO LA MINOR AXIS: 5 CM
ECHO LA VOL BP: 37 ML (ref 22–52)
ECHO LA VOL MOD A2C: 29 ML (ref 22–52)
ECHO LA VOL MOD A4C: 45 ML (ref 22–52)
ECHO LA VOL/BSA BIPLANE: 18 ML/M2 (ref 16–34)
ECHO LA VOLUME INDEX MOD A2C: 14 ML/M2 (ref 16–34)
ECHO LA VOLUME INDEX MOD A4C: 22 ML/M2 (ref 16–34)
ECHO LV E' LATERAL VELOCITY: 12 CM/S
ECHO LV EDV A2C: 59 ML
ECHO LV EDV A4C: 57 ML
ECHO LV EDV INDEX A4C: 28 ML/M2
ECHO LV EDV NDEX A2C: 29 ML/M2
ECHO LV EJECTION FRACTION A2C: 57 %
ECHO LV EJECTION FRACTION A4C: 56 %
ECHO LV EJECTION FRACTION BIPLANE: 57 % (ref 55–100)
ECHO LV ESV A2C: 26 ML
ECHO LV ESV A4C: 25 ML
ECHO LV ESV INDEX A2C: 13 ML/M2
ECHO LV ESV INDEX A4C: 12 ML/M2
ECHO LV FRACTIONAL SHORTENING: 32 % (ref 28–44)
ECHO LV INTERNAL DIMENSION DIASTOLE INDEX: 2.27 CM/M2
ECHO LV INTERNAL DIMENSION DIASTOLIC: 4.7 CM (ref 3.9–5.3)
ECHO LV INTERNAL DIMENSION SYSTOLIC INDEX: 1.55 CM/M2
ECHO LV INTERNAL DIMENSION SYSTOLIC: 3.2 CM
ECHO LV IVSD: 1 CM (ref 0.6–0.9)
ECHO LV MASS 2D: 153.4 G (ref 67–162)
ECHO LV MASS INDEX 2D: 74.1 G/M2 (ref 43–95)
ECHO LV POSTERIOR WALL DIASTOLIC: 0.9 CM (ref 0.6–0.9)
ECHO LV RELATIVE WALL THICKNESS RATIO: 0.38
ECHO LVOT AV VTI INDEX: 0.67
ECHO LVOT MEAN GRADIENT: 2 MMHG
ECHO LVOT PEAK GRADIENT: 4 MMHG
ECHO LVOT PEAK VELOCITY: 1 M/S
ECHO LVOT VTI: 16.7 CM
ECHO MV A VELOCITY: 1.02 M/S
ECHO MV E DECELERATION TIME (DT): 174 MS
ECHO MV E VELOCITY: 0.95 M/S
ECHO MV E/A RATIO: 0.93
ECHO MV E/E' LATERAL: 7.92
ECHO PV MAX VELOCITY: 1.2 M/S
ECHO PV PEAK GRADIENT: 6 MMHG
ECHO TV REGURGITANT MAX VELOCITY: 2.47 M/S
ECHO TV REGURGITANT PEAK GRADIENT: 24 MMHG
EOSINOPHIL # BLD: 0 K/UL (ref 0–0.44)
EOSINOPHILS RELATIVE PERCENT: 0 % (ref 1–4)
ERYTHROCYTE [DISTWIDTH] IN BLOOD BY AUTOMATED COUNT: 13.4 % (ref 11.8–14.4)
GFR, ESTIMATED: 81 ML/MIN/1.73M2
GLUCOSE BLD-MCNC: 148 MG/DL (ref 74–100)
GLUCOSE BLD-MCNC: 164 MG/DL (ref 74–100)
GLUCOSE BLD-MCNC: 182 MG/DL (ref 74–100)
GLUCOSE BLD-MCNC: 239 MG/DL (ref 74–100)
GLUCOSE SERPL-MCNC: 151 MG/DL (ref 74–99)
HCT VFR BLD AUTO: 55.6 % (ref 36.3–47.1)
HGB BLD-MCNC: 18.4 G/DL (ref 11.9–15.1)
IMM GRANULOCYTES # BLD AUTO: 0.09 K/UL (ref 0–0.3)
IMM GRANULOCYTES NFR BLD: 1 %
LYMPHOCYTES NFR BLD: 1.02 K/UL (ref 1.1–3.7)
LYMPHOCYTES RELATIVE PERCENT: 12 % (ref 24–43)
MCH RBC QN AUTO: 30.7 PG (ref 25.2–33.5)
MCHC RBC AUTO-ENTMCNC: 33.1 G/DL (ref 28.4–34.8)
MCV RBC AUTO: 92.8 FL (ref 82.6–102.9)
MONOCYTES NFR BLD: 0.17 K/UL (ref 0.1–1.2)
MONOCYTES NFR BLD: 2 % (ref 3–12)
MORPHOLOGY: NORMAL
NEUTROPHILS NFR BLD: 85 % (ref 36–65)
NEUTS SEG NFR BLD: 7.22 K/UL (ref 1.5–8.1)
NRBC BLD-RTO: 0 PER 100 WBC
PLATELET # BLD AUTO: 276 K/UL (ref 138–453)
PMV BLD AUTO: 9.9 FL (ref 8.1–13.5)
POTASSIUM SERPL-SCNC: 4.2 MMOL/L (ref 3.7–5.3)
PROCALCITONIN SERPL-MCNC: 0.06 NG/ML (ref 0–0.09)
PROT SERPL-MCNC: 7.3 G/DL (ref 6.6–8.7)
RBC # BLD AUTO: 5.99 M/UL (ref 3.95–5.11)
SODIUM SERPL-SCNC: 135 MMOL/L (ref 136–145)
WBC OTHER # BLD: 8.5 K/UL (ref 3.5–11.3)

## 2024-08-29 PROCEDURE — 86140 C-REACTIVE PROTEIN: CPT

## 2024-08-29 PROCEDURE — 94664 DEMO&/EVAL PT USE INHALER: CPT

## 2024-08-29 PROCEDURE — 6370000000 HC RX 637 (ALT 250 FOR IP)

## 2024-08-29 PROCEDURE — 84145 PROCALCITONIN (PCT): CPT

## 2024-08-29 PROCEDURE — 6360000002 HC RX W HCPCS: Performed by: STUDENT IN AN ORGANIZED HEALTH CARE EDUCATION/TRAINING PROGRAM

## 2024-08-29 PROCEDURE — 93306 TTE W/DOPPLER COMPLETE: CPT

## 2024-08-29 PROCEDURE — 96372 THER/PROPH/DIAG INJ SC/IM: CPT

## 2024-08-29 PROCEDURE — 1200000000 HC SEMI PRIVATE

## 2024-08-29 PROCEDURE — 82947 ASSAY GLUCOSE BLOOD QUANT: CPT

## 2024-08-29 PROCEDURE — 94640 AIRWAY INHALATION TREATMENT: CPT

## 2024-08-29 PROCEDURE — 99223 1ST HOSP IP/OBS HIGH 75: CPT | Performed by: INTERNAL MEDICINE

## 2024-08-29 PROCEDURE — 80053 COMPREHEN METABOLIC PANEL: CPT

## 2024-08-29 PROCEDURE — 6370000000 HC RX 637 (ALT 250 FOR IP): Performed by: INTERNAL MEDICINE

## 2024-08-29 PROCEDURE — 85025 COMPLETE CBC W/AUTO DIFF WBC: CPT

## 2024-08-29 PROCEDURE — 96376 TX/PRO/DX INJ SAME DRUG ADON: CPT

## 2024-08-29 PROCEDURE — 96365 THER/PROPH/DIAG IV INF INIT: CPT

## 2024-08-29 PROCEDURE — G0378 HOSPITAL OBSERVATION PER HR: HCPCS

## 2024-08-29 PROCEDURE — 2700000000 HC OXYGEN THERAPY PER DAY

## 2024-08-29 PROCEDURE — 93306 TTE W/DOPPLER COMPLETE: CPT | Performed by: FAMILY MEDICINE

## 2024-08-29 PROCEDURE — 96367 TX/PROPH/DG ADDL SEQ IV INF: CPT

## 2024-08-29 PROCEDURE — 2580000003 HC RX 258: Performed by: INTERNAL MEDICINE

## 2024-08-29 PROCEDURE — 83880 ASSAY OF NATRIURETIC PEPTIDE: CPT

## 2024-08-29 PROCEDURE — 6360000002 HC RX W HCPCS: Performed by: INTERNAL MEDICINE

## 2024-08-29 PROCEDURE — 2580000003 HC RX 258: Performed by: STUDENT IN AN ORGANIZED HEALTH CARE EDUCATION/TRAINING PROGRAM

## 2024-08-29 PROCEDURE — 94761 N-INVAS EAR/PLS OXIMETRY MLT: CPT

## 2024-08-29 PROCEDURE — 36415 COLL VENOUS BLD VENIPUNCTURE: CPT

## 2024-08-29 RX ORDER — NICOTINE 21 MG/24HR
1 PATCH, TRANSDERMAL 24 HOURS TRANSDERMAL DAILY
Status: DISCONTINUED | OUTPATIENT
Start: 2024-08-30 | End: 2024-08-30 | Stop reason: HOSPADM

## 2024-08-29 RX ORDER — WATER 10 ML/10ML
INJECTION INTRAMUSCULAR; INTRAVENOUS; SUBCUTANEOUS
Status: DISPENSED
Start: 2024-08-29 | End: 2024-08-29

## 2024-08-29 RX ADMIN — BUDESONIDE AND FORMOTEROL FUMARATE DIHYDRATE 2 PUFF: 80; 4.5 AEROSOL RESPIRATORY (INHALATION) at 08:30

## 2024-08-29 RX ADMIN — DILTIAZEM HYDROCHLORIDE 180 MG: 180 CAPSULE, COATED, EXTENDED RELEASE ORAL at 09:49

## 2024-08-29 RX ADMIN — ARIPIPRAZOLE 30 MG: 5 TABLET ORAL at 09:48

## 2024-08-29 RX ADMIN — SODIUM CHLORIDE, PRESERVATIVE FREE 10 ML: 5 INJECTION INTRAVENOUS at 20:06

## 2024-08-29 RX ADMIN — ASPIRIN 81 MG: 81 TABLET, COATED ORAL at 09:49

## 2024-08-29 RX ADMIN — HYDROXYZINE HYDROCHLORIDE 50 MG: 50 TABLET, FILM COATED ORAL at 20:59

## 2024-08-29 RX ADMIN — ACETAMINOPHEN 650 MG: 325 TABLET ORAL at 12:39

## 2024-08-29 RX ADMIN — ENOXAPARIN SODIUM 30 MG: 100 INJECTION SUBCUTANEOUS at 09:50

## 2024-08-29 RX ADMIN — BACLOFEN 10 MG: 10 TABLET ORAL at 20:05

## 2024-08-29 RX ADMIN — BACLOFEN 10 MG: 10 TABLET ORAL at 09:50

## 2024-08-29 RX ADMIN — GABAPENTIN 300 MG: 300 CAPSULE ORAL at 20:05

## 2024-08-29 RX ADMIN — CARVEDILOL 6.25 MG: 6.25 TABLET, FILM COATED ORAL at 09:50

## 2024-08-29 RX ADMIN — IPRATROPIUM BROMIDE AND ALBUTEROL SULFATE 1 DOSE: 2.5; .5 SOLUTION RESPIRATORY (INHALATION) at 20:45

## 2024-08-29 RX ADMIN — SPIRONOLACTONE 25 MG: 25 TABLET ORAL at 09:49

## 2024-08-29 RX ADMIN — CARVEDILOL 6.25 MG: 6.25 TABLET, FILM COATED ORAL at 20:05

## 2024-08-29 RX ADMIN — IPRATROPIUM BROMIDE AND ALBUTEROL SULFATE 1 DOSE: 2.5; .5 SOLUTION RESPIRATORY (INHALATION) at 15:16

## 2024-08-29 RX ADMIN — ENOXAPARIN SODIUM 30 MG: 100 INJECTION SUBCUTANEOUS at 20:05

## 2024-08-29 RX ADMIN — WATER 125 MG: 1 INJECTION INTRAMUSCULAR; INTRAVENOUS; SUBCUTANEOUS at 09:49

## 2024-08-29 RX ADMIN — BACLOFEN 10 MG: 10 TABLET ORAL at 13:36

## 2024-08-29 RX ADMIN — AZITHROMYCIN MONOHYDRATE 500 MG: 500 INJECTION, POWDER, LYOPHILIZED, FOR SOLUTION INTRAVENOUS at 06:30

## 2024-08-29 RX ADMIN — GABAPENTIN 300 MG: 300 CAPSULE ORAL at 13:36

## 2024-08-29 RX ADMIN — IPRATROPIUM BROMIDE AND ALBUTEROL SULFATE 1 DOSE: 2.5; .5 SOLUTION RESPIRATORY (INHALATION) at 08:30

## 2024-08-29 RX ADMIN — SODIUM CHLORIDE, PRESERVATIVE FREE 10 ML: 5 INJECTION INTRAVENOUS at 09:50

## 2024-08-29 RX ADMIN — CEFTRIAXONE SODIUM 1000 MG: 1 INJECTION, POWDER, FOR SOLUTION INTRAMUSCULAR; INTRAVENOUS at 05:43

## 2024-08-29 RX ADMIN — BUDESONIDE AND FORMOTEROL FUMARATE DIHYDRATE 2 PUFF: 80; 4.5 AEROSOL RESPIRATORY (INHALATION) at 20:44

## 2024-08-29 RX ADMIN — GABAPENTIN 300 MG: 300 CAPSULE ORAL at 09:49

## 2024-08-29 ASSESSMENT — PAIN DESCRIPTION - DESCRIPTORS: DESCRIPTORS: ACHING

## 2024-08-29 ASSESSMENT — PAIN DESCRIPTION - LOCATION: LOCATION: HEAD

## 2024-08-29 ASSESSMENT — PAIN SCALES - GENERAL
PAINLEVEL_OUTOF10: 3
PAINLEVEL_OUTOF10: 6

## 2024-08-29 ASSESSMENT — PAIN DESCRIPTION - ORIENTATION: ORIENTATION: ANTERIOR

## 2024-08-29 ASSESSMENT — PAIN - FUNCTIONAL ASSESSMENT: PAIN_FUNCTIONAL_ASSESSMENT: ACTIVITIES ARE NOT PREVENTED

## 2024-08-29 NOTE — PROGRESS NOTES
[address] , [place], [place], [address]    Progress Note    Date:   8/29/2024  Patient name:  Yesi Zuluaga  Date of admission:  8/28/2024  4:16 PM  MRN:   [id number]  YOB: 1970    SUBJECTIVE/Last 24 hours update:     Patient seen and examined at the bed side , no new acute events overnight and no new complains noted. VSS, afebrile. Breathing is better. She remains on oxygen. Notes from nursing staff and Consults had been reviewed, and the overnight progress had been checked with the nursing staff as well.      REVIEW OF SYSTEMS:      CONSTITUTIONAL:  no fevers, no headcahes  EYES: negative for blury vision  HEENT: No headaches, No nasal congestion, no difficulty swallowing  RESPIRATORY: positive for dyspnea, no wheezing, no Cough  CARDIOVASCULAR: negative for chest pain, no palpitations  GASTROINTESTINAL: no nausea, no vomiting, no change in bowel habits, no abdominal pain   GENITOURINARY: negative for dysuria, no hematuria   MUSCULOSKELETAL: no joint pains, no muscle aches, no swelling of joints or extremities  NEUROLOGICAL: No  Weakness or numbness      PAST MEDICAL HISTORY:      has a past medical history of Anxiety, Congenital heart disease, COPD (chronic obstructive pulmonary disease) ([facility]), Depression, Family history of thyroid problem, Headache, Hypertension, Obesity, Osteoarthritis, Sleep apnea, and Type 2 diabetes mellitus without complication ([facility]).    PAST SURGICAL HISTORY:      has a past surgical history that includes Cholecystectomy.       SOCIAL HISTORY:      reports that she has been smoking cigarettes. She started smoking about 3 years ago. She has a 33.3 pack-year smoking history. She has never used smokeless tobacco. She reports that she does not currently use alcohol. She reports that she does not use drugs.    TOBACCO:   reports that she has been smoking cigarettes. She started smoking about 3 years ago. She has a 33.3 pack-year smoking history. She

## 2024-08-29 NOTE — CARE COORDINATION
Case Management Assessment  Initial Evaluation    Date/Time of Evaluation: 8/29/2024 9:46 AM  Assessment Completed by: Elias Landa RN    If patient is discharged prior to next notation, then this note serves as note for discharge by case management.    Patient Name: Yesi Zuluaga                   YOB: 1970  Diagnosis: COPD exacerbation (HCC) [J44.1]  Acute on chronic systolic congestive heart failure (HCC) [I50.23]  Acute hypoxic respiratory failure (HCC) [J96.01]                   Date / Time: 8/28/2024  4:16 PM    Patient Admission Status: Inpatient   Readmission Risk (Low < 19, Mod (19-27), High > 27): Readmission Risk Score: 11.5    Current PCP: Kathie Jacobson APRN - CNP  PCP verified by CM? (P) Yes    Chart Reviewed: Yes      History Provided by: (P) Patient  Patient Orientation: (P) Alert and Oriented, Person, Place, Situation    Patient Cognition: (P) Alert    Hospitalization in the last 30 days (Readmission):  No    If yes, Readmission Assessment in  Navigator will be completed.    Advance Directives:      Code Status: Full Code   Patient's Primary Decision Maker is: (P) Legal Next of Kin    Primary Decision Maker (Active): Edson Hernandez - [facility] - [phone]    Discharge Planning:    Patient lives with: (P) Spouse/Significant Other Type of Home: (P) Trailer/Mobile Home  Primary Care Giver: (P) Self  Patient Support Systems include: (P) Spouse/Significant Other   Current Financial resources: (P) Medicaid  Current community resources: (P) None  Current services prior to admission: (P) C-pap            Current DME:              Type of Home Care services:  (P) None    ADLS  Prior functional level: (P) Independent in ADLs/IADLs  Current functional level: (P) Independent in ADLs/IADLs    PT AM-PAC:   /24  OT AM-PAC:   /24    Family can provide assistance at DC: (P) Yes  Would you like Case Management to discuss the discharge plan with any other family members/significant others, and  provided to:     Patient Representative Name:       The Patient and/or Patient Representative Agree with the Discharge Plan?  Yes    Elias Landa RN  Case Management Department  Ph:[phone]

## 2024-08-29 NOTE — PROGRESS NOTES
Pt resting in bed quietly with eyes closed. Respirations even and unlabored. No distress noted. Nasal canula continues at 3 L. Call light in reach. Will continue to monitor.

## 2024-08-29 NOTE — CONSULTS
I, Anne-Marie Sanchez am scribing for and in the presence of Rudi Mora MD, F.A.C.C..    Patient: Yesi Zuluaga  : 1970  Date of Visit: 2024    REASON FOR VISIT / CONSULTATION: Shortness of Breath (Complains of sob that started yesterday, was sent here from pcp office)    History of Present Illness:      Dear Kathie Jacobson, APRN - CNP    I had the pleasure of seeing Yesi Zuluaga, who is a 53 y.o. female who is admitted with worsening shortness of breath.  Previously known to me from prior office visits.  She has history of chest pain and diastolic CHF.    She reports a history of suspected heart failure, she did see a Cardiologist one time in [place] but did not continue to follow up and never received a diagnosis. She does have hypertension. No history of hyperlipidemia. She did have gestational diabetes with all three of her children. No other issues during pregnancy. She does have diabetes last HgA1C was borderline at 5.9 2021. She is a current everyday smoker of 1 PPD for the last 28 years. She has three grown children.     Her family history includes her father had a heart attack and had open heart surgery. He  in his 60's. Her brother has A-Fib.    Holter done 2021: Sinus rhythm with average HR of 97 bpm, ranging between 73 and 141 bpm, sinus tachycardia represnted 34% of the study  duration. Rare isolated PACsand PVCs noted.    Echo done on 2022- EF > 60% No significant left ventricular hypertrophy and with normal left ventricular cavity size. Unable to assess specific wall motion abnormalities due to image quality. An anterior echo free space is seen suggestive of a small effusion or fat pad. Evidence of mild (grade I) diastolic dysfunction is seen.   No previous studies were available for comparison. Other than perhaps mild diastolic dysfunction, no significant structural cause of shortness of breath was identified from this study.     Stress test done on

## 2024-08-29 NOTE — PROGRESS NOTES
Vitals and assessment complete. See flowsheets for details. HR is 101. Patient denies chest pain, palpitations, or lightheadedness. Breathing is regular and unlabored. Dyspnea noted with exertion. SPO2  is 94% on 3L, turned down to 2L at this time. Lung sounds are diminished, few expiratory wheezes noted in left lower lobe. Patient reports feeling better. Patient denies further needs at this time. Call light is within reach. Care ongoing.

## 2024-08-29 NOTE — PROGRESS NOTES
[facility]. Assessment and vital signs as charted. Pt denies pain at this time. Navigator to be completed. Orders reviewed with pt. Nasal canula on at 3 L. Pt denies any other needs. Call light in reach. Will continue to monitor.

## 2024-08-29 NOTE — PLAN OF CARE
Problem: Safety - Adult  Goal: Free from fall injury  Outcome: Progressing  Flowsheets (Taken 8/29/2024 0048)  Free From Fall Injury: Instruct family/caregiver on patient safety  Note: Pt is at medium risk for falls. Non skid socks on. Bed in low position and wheels locked. Fall sign posted and bracelet on. Siderails up x 2. Call light within reach. Will continue to assess.     Problem: Respiratory - Adult  Goal: Achieves optimal ventilation and oxygenation  Outcome: Progressing  Flowsheets (Taken 8/29/2024 0048)  Achieves optimal ventilation and oxygenation:   Assess for changes in respiratory status   Position to facilitate oxygenation and minimize respiratory effort   Assess the need for suctioning and aspirate as needed   Respiratory therapy support as indicated   Assess for changes in mentation and behavior   Oxygen supplementation based on oxygen saturation or arterial blood gases   Encourage broncho-pulmonary hygiene including cough, deep breathe, incentive spirometry   Assess and instruct to report shortness of breath or any respiratory difficulty  Note: Pulse ox WNL. O2 on @ 3L per nasal canula. Lungs clear and diminished. SOB noted with exertion. Pt denies any discomfort. Will continue to assess.     Problem: Cardiovascular - Adult  Goal: Maintains optimal cardiac output and hemodynamic stability  Outcome: Progressing  Flowsheets (Taken 8/29/2024 0048)  Maintains optimal cardiac output and hemodynamic stability: Monitor blood pressure and heart rate  Note: Cardiac monitor on. VS stable and WNL. Will continue to assess.

## 2024-08-29 NOTE — PROGRESS NOTES
RESPIRATORY ASSESSMENT PROTOCOL                                                                                              Patient Name: Yesi Zuluaga Room#: 0327/0327-01 : 1970     Admitting diagnosis: COPD exacerbation ([facility]) [J44.1]  Acute on chronic systolic congestive heart failure (HCC) [I50.23]  Acute hypoxic respiratory failure (HCC) [J96.01]       Medical History:   Past Medical History:   Diagnosis Date    Anxiety     Congenital heart disease     COPD (chronic obstructive pulmonary disease) ([facility])     Depression     Family history of thyroid problem     Headache     Hypertension     Obesity     Osteoarthritis     Sleep apnea     Type 2 diabetes mellitus without complication ([facility])        PATIENT ASSESSMENT    LABORATORY DATA  Hematology:   Lab Results   Component Value Date/Time    WBC 8.5 2024 05:56 AM    RBC 5.99 2024 05:56 AM    HGB 18.4 2024 05:56 AM    HCT 55.6 2024 05:56 AM     2024 05:56 AM     Chemistry:    Lab Results   Component Value Date/Time    PHART 7.385 2024 06:31 PM    HMQ1DFP 40.6 2024 06:31 PM    PO2ART 69.7 2024 06:31 PM    D8IKIPSC 93.8 2024 06:31 PM    CQX5CJC 23.8 2024 06:31 PM    NBEA 1.2 2024 06:31 PM       VITALS  Pulse: (!) 105   Respirations: 18  BP: 117/70  SpO2: 95 % O2 Device: Nasal cannula  Temp: 97.2 °F (36.2 °C)    SKIN COLOR  [x] Normal  [] Pale  [] Dusky  [] Cyanotic    RESPIRATORY PATTERN  [x] Normal  [] Dyspnea  [] Cheyne-Johnston  [] Kussmaul  [] Biots    AMBULATORY  [x] Yes  [] No  [] With Assistance    PEAK FLOW  Predicted:     Personal Best:            Patient Acuity 0 1 2 3 4 Score   Level of Consciousness (LOC) [x]  Alert & Oriented or Pt normal LOC []  Confused;follows directions []  Confused & uncooper-ative []  Obtunded []  Comatose 0   Respiratory Rate  (RR) []  Reg. rate & pattern. 12 - 20 bpm  []  Increased RR. Greater than 20 bpm   [x]  SOB w/ exertion or RR greater than 24 bpm

## 2024-08-29 NOTE — H&P
History and Physical    Patient:  Yesi Zuluaga  MRN: [id number]    Chief Complaint:  shortness of breath     History Obtained From:  patient, electronic medical record    PCP: Kathie Jacobson APRN - CNP    History of Present Illness:   The patient is a 53 y.o. female who presents with shortness of breath.  Patient states this started few days ago and has progressively gotten worse.  She has had intermittent midsternal chest pain with it.  She states she is unable to ambulate far without becoming extremely short of breath.  Today she began to wheeze.  She complains of chronic lower extremity and abdominal swelling.  She denies any sick exposure, fever, chills or cough.  She is a smoker however states she has been cutting back.  She has a history of sleep apnea and wears CPAP regularly.  She was scheduled for an outpatient coronary CTA [place] yesterday but was unable to completed due to her heart rate being too high.  She states she was recently started on carvedilol.  She went to see her PCP today for evaluation of her shortness of breath and increased heart rate.  Her SpO2 at that appointment was 87% and she was sent to the emergency department.  She was started on 2 L of oxygen per nasal cannula.  Upon ambulation to the bathroom, she became increasingly short of breath and her oxygen was increased to 4 L.  She is currently on 3 L with an oxygen saturation of 95%.  She does not wear oxygen at home.  She was given a dose of IV Lasix in the emergency department and has had good output with it.  Past medical history includes COPD, congenital heart disease, with type 2 diabetes, obesity, hypertension, sleep apnea, and depression.  Chest x-ray showed mild pulmonary vascular congestion, WBC 14.9 with a left shift, proBNP 154, troponin less than 6.  Arterial blood gases were pH of 7.385, pCO2 40.6, pO2 69.7, HCO3 23.8, O2 sat 93.8.  EKG showed sinus rhythm with a left posterior fascicular block.  She also had a  chronic illness with exacerbation, progression or side effects of treatment  Condition is stable  Treatment plan:   Oxygen therapy protocol  Monitor labs and replace electrolytes  Telemetry monitoring  Imaging: no further imaging studies ordered today  Medications:   Steroids  Nebs  Continue Symbicort  Medication Monitoring / High Risk Medications: none      Fluid overload/likely chronic CHF  Condition is stable  Treatment plan:   I&O, daily weights  Oxygen therapy protocol  Imaging: Echo ordered  Medications:   Continue carvedilol  Continue spironolactone  Received IV Lasix x 1 in ED      Hypertension  Condition is a chronic stable condition  Treatment plan: Continue current treatment  Imaging: no further imaging studies ordered today  Medications: Continue carvedilol and diltiazem    Type 2 diabetes  Condition is a chronic stable condition  Treatment plan:  POC glucose ACHS  Diabetic diet  Medications:  Humalog sliding scale  Continue Trulicity  Hypoglycemia protocol    Nutrition status:   morbid obesity  Dietician consult initiated    Hospital Prophylaxis:   DVT: Lovenox   Stress Ulcer: Not indicated at this time     MDM Data:   Test interpretation:  My independent EKG interpretation: normal sinus rhythm  My independent X-ray interpretation:  CXR shows no acute process  Management and/or test interpretation discussed with ER MD at time of admission  Consults and Nursing notes were personally reviewed, all current labs and imaging were personally reviewed, tests ordered: CBC, BMP, and history obtained by independent historian       Disposition:  Shared decision making: All test results, treatment options and disposition options were discussed with the patient today  Social determinants of health that may impact management: none  Code status: Full Code   Disposition: Discharge plan is home        Critical Care Time:  Total critical care time caring for this patient with life threatening, unstable organ failure,

## 2024-08-29 NOTE — RT PROTOCOL NOTE
RESPIRATORY ASSESSMENT PROTOCOL                                                                                              Patient Name: Yesi Zuluaga Room#: [id number]/0327-01 : 1970     Admitting diagnosis: COPD exacerbation ([facility]) [J44.1]  Acute on chronic systolic congestive heart failure (HCC) [I50.23]  Acute hypoxic respiratory failure (HCC) [J96.01]       Medical History:   Past Medical History:   Diagnosis Date    Anxiety     Congenital heart disease     COPD (chronic obstructive pulmonary disease) ([facility])     Depression     Family history of thyroid problem     Headache     Hypertension     Obesity     Osteoarthritis     Sleep apnea     Type 2 diabetes mellitus without complication ([facility])        PATIENT ASSESSMENT    LABORATORY DATA  Hematology:   Lab Results   Component Value Date/Time    WBC 14.9 2024 04:14 PM    RBC 5.82 2024 04:14 PM    HGB 18.2 2024 04:14 PM    HCT 54.6 2024 04:14 PM     2024 04:14 PM     Chemistry:    Lab Results   Component Value Date/Time    PHART 7.385 2024 06:31 PM    FKL1TJK 40.6 2024 06:31 PM    PO2ART 69.7 2024 06:31 PM    K0IVKUTY 93.8 2024 06:31 PM    CBW0ZWL 23.8 2024 06:31 PM    NBEA 1.2 2024 06:31 PM       VITALS  Pulse: 92   Respirations: 20  BP: 137/88  SpO2: 91 % O2 Device: Other (Comment) ([employer])  Temp: 98.2 °F (36.8 °C)    SKIN COLOR  [x] Normal  [] Pale  [] Dusky  [] Cyanotic    RESPIRATORY PATTERN  [x] Normal  [] Dyspnea  [] Cheyne-Johnston  [] Kussmaul  [] Biots    AMBULATORY  [x] Yes  [] No  [] With Assistance          Patient Acuity 0 1 2 3 4 Score   Level of Consciousness (LOC) [x]  Alert & Oriented or Pt normal LOC []  Confused;follows directions []  Confused & uncooper-ative []  Obtunded []  Comatose 0   Respiratory Rate  (RR) [x]  Reg. rate & pattern. 12 - 20 bpm  []  Increased RR. Greater than 20 bpm   []  SOB w/ exertion or RR greater than 24 bpm []  Access- ory muscle use at rest.

## 2024-08-30 VITALS
RESPIRATION RATE: 18 BRPM | HEART RATE: 82 BPM | WEIGHT: 255 LBS | TEMPERATURE: 98.1 F | SYSTOLIC BLOOD PRESSURE: 123 MMHG | BODY MASS INDEX: 50.06 KG/M2 | HEIGHT: 60 IN | DIASTOLIC BLOOD PRESSURE: 84 MMHG | OXYGEN SATURATION: 95 %

## 2024-08-30 LAB
ALBUMIN SERPL-MCNC: 3.8 G/DL (ref 3.5–5.2)
ALBUMIN/GLOB SERPL: 1.2 {RATIO} (ref 1–2.5)
ALP SERPL-CCNC: 105 U/L (ref 35–104)
ALT SERPL-CCNC: 20 U/L (ref 10–35)
ANION GAP SERPL CALCULATED.3IONS-SCNC: 12 MMOL/L (ref 9–16)
AST SERPL-CCNC: 18 U/L (ref 10–35)
BASOPHILS # BLD: 0.03 K/UL (ref 0–0.2)
BASOPHILS NFR BLD: 0 % (ref 0–2)
BILIRUB SERPL-MCNC: 0.3 MG/DL (ref 0–1.2)
BNP SERPL-MCNC: 252 PG/ML (ref 0–125)
BUN SERPL-MCNC: 18 MG/DL (ref 6–20)
BUN/CREAT SERPL: 23 (ref 9–20)
CALCIUM SERPL-MCNC: 9.6 MG/DL (ref 8.6–10.4)
CHLORIDE SERPL-SCNC: 101 MMOL/L (ref 98–107)
CHOLEST SERPL-MCNC: 218 MG/DL (ref 0–199)
CHOLESTEROL/HDL RATIO: 4
CO2 SERPL-SCNC: 23 MMOL/L (ref 20–31)
CREAT SERPL-MCNC: 0.8 MG/DL (ref 0.5–0.9)
CRP SERPL HS-MCNC: 7.5 MG/L (ref 0–5)
ECHO BSA: 2.22 M2
EKG ATRIAL RATE: 81 BPM
EKG P AXIS: -3 DEGREES
EKG P-R INTERVAL: 132 MS
EKG Q-T INTERVAL: 370 MS
EKG QRS DURATION: 86 MS
EKG QTC CALCULATION (BAZETT): 429 MS
EKG R AXIS: -50 DEGREES
EKG T AXIS: 27 DEGREES
EKG VENTRICULAR RATE: 81 BPM
EOSINOPHIL # BLD: <0.03 K/UL (ref 0–0.44)
EOSINOPHILS RELATIVE PERCENT: 0 % (ref 1–4)
ERYTHROCYTE [DISTWIDTH] IN BLOOD BY AUTOMATED COUNT: 13.4 % (ref 11.8–14.4)
GFR, ESTIMATED: 85 ML/MIN/1.73M2
GLUCOSE BLD-MCNC: 142 MG/DL (ref 74–100)
GLUCOSE BLD-MCNC: 169 MG/DL (ref 74–100)
GLUCOSE SERPL-MCNC: 139 MG/DL (ref 74–99)
HCT VFR BLD AUTO: 53.1 % (ref 36.3–47.1)
HDLC SERPL-MCNC: 52 MG/DL
HGB BLD-MCNC: 17.8 G/DL (ref 11.9–15.1)
IMM GRANULOCYTES # BLD AUTO: 0.11 K/UL (ref 0–0.3)
IMM GRANULOCYTES NFR BLD: 1 %
LDLC SERPL CALC-MCNC: 141 MG/DL (ref 0–100)
LYMPHOCYTES NFR BLD: 1.37 K/UL (ref 1.1–3.7)
LYMPHOCYTES RELATIVE PERCENT: 9 % (ref 24–43)
MCH RBC QN AUTO: 31.1 PG (ref 25.2–33.5)
MCHC RBC AUTO-ENTMCNC: 33.5 G/DL (ref 28.4–34.8)
MCV RBC AUTO: 92.7 FL (ref 82.6–102.9)
MONOCYTES NFR BLD: 0.97 K/UL (ref 0.1–1.2)
MONOCYTES NFR BLD: 7 % (ref 3–12)
NEUTROPHILS NFR BLD: 83 % (ref 36–65)
NEUTS SEG NFR BLD: 12.22 K/UL (ref 1.5–8.1)
NRBC BLD-RTO: 0 PER 100 WBC
PLATELET # BLD AUTO: 287 K/UL (ref 138–453)
PMV BLD AUTO: 10 FL (ref 8.1–13.5)
POTASSIUM SERPL-SCNC: 4.6 MMOL/L (ref 3.7–5.3)
PROCALCITONIN SERPL-MCNC: 0.03 NG/ML (ref 0–0.09)
PROT SERPL-MCNC: 7 G/DL (ref 6.6–8.7)
RBC # BLD AUTO: 5.73 M/UL (ref 3.95–5.11)
SODIUM SERPL-SCNC: 136 MMOL/L (ref 136–145)
TRIGL SERPL-MCNC: 124 MG/DL
TROPONIN I SERPL HS-MCNC: <6 NG/L (ref 0–14)
VLDLC SERPL CALC-MCNC: 25 MG/DL
WBC OTHER # BLD: 14.7 K/UL (ref 3.5–11.3)

## 2024-08-30 PROCEDURE — 7100000010 HC PHASE II RECOVERY - FIRST 15 MIN: Performed by: FAMILY MEDICINE

## 2024-08-30 PROCEDURE — 6360000002 HC RX W HCPCS: Performed by: STUDENT IN AN ORGANIZED HEALTH CARE EDUCATION/TRAINING PROGRAM

## 2024-08-30 PROCEDURE — 2500000003 HC RX 250 WO HCPCS: Performed by: FAMILY MEDICINE

## 2024-08-30 PROCEDURE — 85025 COMPLETE CBC W/AUTO DIFF WBC: CPT

## 2024-08-30 PROCEDURE — 96366 THER/PROPH/DIAG IV INF ADDON: CPT

## 2024-08-30 PROCEDURE — B2111ZZ FLUOROSCOPY OF MULTIPLE CORONARY ARTERIES USING LOW OSMOLAR CONTRAST: ICD-10-PCS | Performed by: FAMILY MEDICINE

## 2024-08-30 PROCEDURE — 2700000000 HC OXYGEN THERAPY PER DAY

## 2024-08-30 PROCEDURE — 82947 ASSAY GLUCOSE BLOOD QUANT: CPT

## 2024-08-30 PROCEDURE — 94761 N-INVAS EAR/PLS OXIMETRY MLT: CPT

## 2024-08-30 PROCEDURE — 6360000002 HC RX W HCPCS: Performed by: FAMILY MEDICINE

## 2024-08-30 PROCEDURE — 2580000003 HC RX 258: Performed by: STUDENT IN AN ORGANIZED HEALTH CARE EDUCATION/TRAINING PROGRAM

## 2024-08-30 PROCEDURE — 83880 ASSAY OF NATRIURETIC PEPTIDE: CPT

## 2024-08-30 PROCEDURE — 80061 LIPID PANEL: CPT

## 2024-08-30 PROCEDURE — 2709999900 HC NON-CHARGEABLE SUPPLY: Performed by: FAMILY MEDICINE

## 2024-08-30 PROCEDURE — 84484 ASSAY OF TROPONIN QUANT: CPT

## 2024-08-30 PROCEDURE — 4A023N7 MEASUREMENT OF CARDIAC SAMPLING AND PRESSURE, LEFT HEART, PERCUTANEOUS APPROACH: ICD-10-PCS | Performed by: FAMILY MEDICINE

## 2024-08-30 PROCEDURE — G0378 HOSPITAL OBSERVATION PER HR: HCPCS

## 2024-08-30 PROCEDURE — 6360000004 HC RX CONTRAST MEDICATION: Performed by: FAMILY MEDICINE

## 2024-08-30 PROCEDURE — 7100000011 HC PHASE II RECOVERY - ADDTL 15 MIN: Performed by: FAMILY MEDICINE

## 2024-08-30 PROCEDURE — 36415 COLL VENOUS BLD VENIPUNCTURE: CPT

## 2024-08-30 PROCEDURE — C1894 INTRO/SHEATH, NON-LASER: HCPCS | Performed by: FAMILY MEDICINE

## 2024-08-30 PROCEDURE — 6370000000 HC RX 637 (ALT 250 FOR IP): Performed by: INTERNAL MEDICINE

## 2024-08-30 PROCEDURE — 80053 COMPREHEN METABOLIC PANEL: CPT

## 2024-08-30 PROCEDURE — 93458 L HRT ARTERY/VENTRICLE ANGIO: CPT | Performed by: FAMILY MEDICINE

## 2024-08-30 PROCEDURE — B2151ZZ FLUOROSCOPY OF LEFT HEART USING LOW OSMOLAR CONTRAST: ICD-10-PCS | Performed by: FAMILY MEDICINE

## 2024-08-30 PROCEDURE — 86140 C-REACTIVE PROTEIN: CPT

## 2024-08-30 PROCEDURE — 6370000000 HC RX 637 (ALT 250 FOR IP)

## 2024-08-30 PROCEDURE — 84145 PROCALCITONIN (PCT): CPT

## 2024-08-30 PROCEDURE — C1769 GUIDE WIRE: HCPCS | Performed by: FAMILY MEDICINE

## 2024-08-30 PROCEDURE — 2580000003 HC RX 258: Performed by: FAMILY MEDICINE

## 2024-08-30 RX ORDER — HEPARIN SODIUM 1000 [USP'U]/ML
INJECTION, SOLUTION INTRAVENOUS; SUBCUTANEOUS PRN
Status: DISCONTINUED | OUTPATIENT
Start: 2024-08-30 | End: 2024-08-30 | Stop reason: HOSPADM

## 2024-08-30 RX ORDER — NITROGLYCERIN 20 MG/100ML
INJECTION INTRAVENOUS PRN
Status: DISCONTINUED | OUTPATIENT
Start: 2024-08-30 | End: 2024-08-30 | Stop reason: HOSPADM

## 2024-08-30 RX ORDER — SODIUM CHLORIDE 9 MG/ML
INJECTION, SOLUTION INTRAVENOUS PRN
Status: DISCONTINUED | OUTPATIENT
Start: 2024-08-30 | End: 2024-08-30 | Stop reason: HOSPADM

## 2024-08-30 RX ORDER — NITROGLYCERIN 20 MG/100ML
INJECTION INTRAVENOUS CONTINUOUS PRN
Status: COMPLETED | OUTPATIENT
Start: 2024-08-30 | End: 2024-08-30

## 2024-08-30 RX ORDER — ACETAMINOPHEN 325 MG/1
650 TABLET ORAL EVERY 4 HOURS PRN
Status: DISCONTINUED | OUTPATIENT
Start: 2024-08-30 | End: 2024-08-30 | Stop reason: HOSPADM

## 2024-08-30 RX ORDER — SODIUM CHLORIDE 0.9 % (FLUSH) 0.9 %
5-40 SYRINGE (ML) INJECTION EVERY 12 HOURS SCHEDULED
Status: DISCONTINUED | OUTPATIENT
Start: 2024-08-30 | End: 2024-08-30 | Stop reason: HOSPADM

## 2024-08-30 RX ORDER — SODIUM CHLORIDE 0.9 % (FLUSH) 0.9 %
5-40 SYRINGE (ML) INJECTION PRN
Status: DISCONTINUED | OUTPATIENT
Start: 2024-08-30 | End: 2024-08-30 | Stop reason: HOSPADM

## 2024-08-30 RX ORDER — PREDNISONE 20 MG/1
20 TABLET ORAL 2 TIMES DAILY
Qty: 10 TABLET | Refills: 0 | Status: SHIPPED | OUTPATIENT
Start: 2024-08-30 | End: 2024-09-04

## 2024-08-30 RX ORDER — NITROGLYCERIN 0.4 MG/1
0.4 TABLET SUBLINGUAL EVERY 5 MIN PRN
Status: DISCONTINUED | OUTPATIENT
Start: 2024-08-30 | End: 2024-08-30 | Stop reason: HOSPADM

## 2024-08-30 RX ORDER — SODIUM CHLORIDE 9 MG/ML
INJECTION, SOLUTION INTRAVENOUS CONTINUOUS
Status: DISCONTINUED | OUTPATIENT
Start: 2024-08-30 | End: 2024-08-30 | Stop reason: HOSPADM

## 2024-08-30 RX ORDER — DOXYCYCLINE HYCLATE 100 MG
100 TABLET ORAL 2 TIMES DAILY
Qty: 14 TABLET | Refills: 0 | Status: SHIPPED | OUTPATIENT
Start: 2024-08-30 | End: 2024-09-06

## 2024-08-30 RX ORDER — LIDOCAINE HYDROCHLORIDE 10 MG/ML
INJECTION, SOLUTION INFILTRATION; PERINEURAL PRN
Status: DISCONTINUED | OUTPATIENT
Start: 2024-08-30 | End: 2024-08-30 | Stop reason: HOSPADM

## 2024-08-30 RX ORDER — IOPAMIDOL 755 MG/ML
INJECTION, SOLUTION INTRAVASCULAR PRN
Status: DISCONTINUED | OUTPATIENT
Start: 2024-08-30 | End: 2024-08-30 | Stop reason: HOSPADM

## 2024-08-30 RX ORDER — SODIUM CHLORIDE 0.9 % (FLUSH) 0.9 %
5-40 SYRINGE (ML) INJECTION PRN
Status: DISCONTINUED | OUTPATIENT
Start: 2024-08-30 | End: 2024-08-30 | Stop reason: SDUPTHER

## 2024-08-30 RX ORDER — DIPHENHYDRAMINE HCL 25 MG
50 CAPSULE ORAL ONCE
Status: DISCONTINUED | OUTPATIENT
Start: 2024-08-30 | End: 2024-08-30 | Stop reason: HOSPADM

## 2024-08-30 RX ORDER — NICOTINE 21 MG/24HR
1 PATCH, TRANSDERMAL 24 HOURS TRANSDERMAL DAILY
Qty: 30 PATCH | Refills: 0 | Status: SHIPPED | OUTPATIENT
Start: 2024-08-30

## 2024-08-30 RX ORDER — SODIUM CHLORIDE 9 MG/ML
INJECTION, SOLUTION INTRAVENOUS PRN
Status: DISCONTINUED | OUTPATIENT
Start: 2024-08-30 | End: 2024-08-30 | Stop reason: SDUPTHER

## 2024-08-30 RX ORDER — SODIUM CHLORIDE 0.9 % (FLUSH) 0.9 %
5-40 SYRINGE (ML) INJECTION EVERY 12 HOURS SCHEDULED
Status: DISCONTINUED | OUTPATIENT
Start: 2024-08-30 | End: 2024-08-30 | Stop reason: SDUPTHER

## 2024-08-30 RX ADMIN — DILTIAZEM HYDROCHLORIDE 180 MG: 180 CAPSULE, COATED, EXTENDED RELEASE ORAL at 11:05

## 2024-08-30 RX ADMIN — AZITHROMYCIN MONOHYDRATE 500 MG: 500 INJECTION, POWDER, LYOPHILIZED, FOR SOLUTION INTRAVENOUS at 06:37

## 2024-08-30 RX ADMIN — ARIPIPRAZOLE 30 MG: 5 TABLET ORAL at 11:05

## 2024-08-30 RX ADMIN — SODIUM CHLORIDE: 9 INJECTION, SOLUTION INTRAVENOUS at 08:31

## 2024-08-30 RX ADMIN — ASPIRIN 81 MG: 81 TABLET, COATED ORAL at 11:06

## 2024-08-30 RX ADMIN — CARVEDILOL 6.25 MG: 6.25 TABLET, FILM COATED ORAL at 11:05

## 2024-08-30 RX ADMIN — CEFTRIAXONE SODIUM 1000 MG: 1 INJECTION, POWDER, FOR SOLUTION INTRAMUSCULAR; INTRAVENOUS at 05:52

## 2024-08-30 RX ADMIN — SPIRONOLACTONE 25 MG: 25 TABLET ORAL at 11:06

## 2024-08-30 NOTE — PROGRESS NOTES
Patient is leaving the floor at this time, patient's significant other is driving her home. Patient discharged.

## 2024-08-30 NOTE — PROGRESS NOTES
Vitals and assessment complete at this time. See flowsheets for details. Patient is resting in bed. Patient is A&O x4. Breathing is regular and unlabored. Dyspnea noted with exertion. Lung sounds are diminished throughout. Patient reports a productive cough. SPO2 is 95% on 2L O2, placed on room air at this time. Patient reports feeling better. Patient is aware of plan for heart cath today and has been NPO since midnight. Patient denies further needs at this time. Call light is within reach. Care ongoing.

## 2024-08-30 NOTE — DISCHARGE INSTRUCTIONS
Discharge Instructions for Cardiac Catheterization    A cardiac catheterization is a diagnostic test used to evaluate the health of the heart and its blood vessels. The test is done with a thin catheter carefully threaded into your heart from a leg or arm artery. Most likely, you will be allowed to go home the same day as the procedure.   Steps to Take at Home:   Pain- apply ice to site 15-20 minutes every hour for the first 2 days.   Showering is okay 24 hours after procedure.    No soaking in a pool, hot tub, bath tub, or standing water for one week.   Bleeding (outward or under the skin-hematoma)- apply firm pressure for 10-15 minutes or until the bleeding stops, then call your doctor. If unable to get bleeding stopped, call 911.    Kidney damage- Call if you urinate less than normal, have swelling or feel puffy, and/or gain 2 or more pounds over night in the first week.   If procedure was in ARM:  You were instructed to keep wrist straight and still for two hours after the procedure. The arm and hand may now be used for normal daily activities except, avoid using the heal of hand while getting up and down from furniture for the first few days.  Keep affected arm elevated, hand higher than elbow, while pressure dressing in place to decrease swelling.  1)  Gauze and Elastoplast   Remove in 4 hours as follows:     TIME: 3:45 PM  Remove 1 piece of tape at a time, waiting 15 -20 minutes between layers to monitor for bleeding.   If dressing sticks, place wrist under cool running water to help loosen gauze from site then pat site dry.   If hand feels numb, tingly, and/or cold- loosen first 1-2 layers of tape if dressing still in place.  If no relief noticed, remove pressure dressing as per above instructions. Seek medical help if no relief or if dressing already off.     Diet   Drink plenty of fluids after the test to flush the x-ray dye from your system.   Return to your normal diet.   No alcoholic beverages for 24

## 2024-08-30 NOTE — PROGRESS NOTES
Patient returned from heart cath. Vitals checked see flowsheets. Placed on continuous vitals q 15. Iv fluid infusing. RUE is pink, warm, cap refill is WNL, and radial pulse is +2. Lunch order placed for patient. Patient denies further needs. Call light is within reach. Care ongoing.

## 2024-08-30 NOTE — DISCHARGE SUMMARY
[address] , [place], [place], [address]    Discharge Summary      NAME:  Yesi Zuluaga  :  1970  MRN:  [id number]    Admit date:  2024  Discharge date:  24      Admitting Physician:  Talon Verdin MD    Primary Diagnosis on Admission:   Present on Admission:   COPD exacerbation (HCC)   VANESSA (obstructive sleep apnea)   Personal history of tobacco use   Type 2 diabetes mellitus without complication (HCC)   Hypertension   Fluid overload      Secondary Diagnoses:  does not have any pertinent problems on file.    Admission Condition:  serious     Discharged Condition: good    Hospital Course:   The patient was admitted for the management of shortness of breath suspected due to his COPD exacerbation.  She started empirically on IV antibiotics, steroids.  Cardiology consultation was obtained given that she also had some chest wall pain.  She had an ECHO completed as well as cardiac cath which indicated no significant coronary artery disease. Today on day of discharge pt feels better with no further complaints. Vitals and Labs are at pts baseline.  All consultants involved during this admission are agreeable to d/c.  Patient was admitted on being discharged today. Risks  Benefits alternatives were discussed with the patient.  She was provided with antibiotics and steroids and she will have close outpatient follow-up. She has a nebulizer at home and will continue with albuterol PRN.    If there are any worsening or concerning signs or symptoms, patient will report to the ED and/or contact [phone] for immediate evaluation. Teach back method was used. All patient questions answered. Pt voiced understanding.       Consults:  Cardiology    Significant Diagnostic/theraputic interventions: IVF, IV steroids, IV Ceftriaxone/Azithromycin      Disposition:   home    Instructions to Patient:      Follow up with Kathie Jacobson APRN - CNP and Cardiology in 1-2 weeks    Discharge  Medications:       Medication List        START taking these medications      doxycycline hyclate 100 MG tablet  Commonly known as: VIBRA-TABS  Take 1 tablet by mouth 2 times daily for 7 days     nicotine 21 MG/24HR  Commonly known as: NICODERM CQ  Place 1 patch onto the skin daily     predniSONE 20 MG tablet  Commonly known as: DELTASONE  Take 1 tablet by mouth 2 times daily for 5 days            CONTINUE taking these medications      * albuterol (2.5 MG/3ML) 0.083% nebulizer solution  Commonly known as: PROVENTIL  inhale contents of 1 vial ( 3 milliliters ) in nebulizer by mouth and INTO THE LUNGS every 4 hours if needed for wheezing or shortness of breath     * albuterol sulfate  (90 Base) MCG/ACT inhaler  Commonly known as: Ventolin HFA  inhale 2 puffs by mouth and INTO THE LUNGS four times a day if needed for shortness of breath     ARIPiprazole 30 MG tablet  Commonly known as: ABILIFY  take 1 take by mouth once daily     aspirin 81 MG EC tablet  Take 1 tablet by mouth daily     baclofen 10 MG tablet  Commonly known as: LIORESAL  Take 1 tablet by mouth 3 times daily     blood glucose monitor kit and supplies  Dispense sufficient amount for indicated testing frequency plus additional to accommodate PRN testing needs. Dispense all needed supplies to include: monitor, strips, lancing device, lancets, control solutions, alcohol swabs.     carvedilol 6.25 MG tablet  Commonly known as: COREG  Take 1 tablet by mouth 2 times daily     dilTIAZem 180 MG extended release capsule  Commonly known as: CARDIZEM CD  Take 1 capsule by mouth daily     gabapentin 300 MG capsule  Commonly known as: NEURONTIN  Take 1 capsule by mouth 3 times daily for 180 days.     hydrOXYzine HCl 50 MG tablet  Commonly known as: ATARAX  take 1 tablet by mouth at bedtime if needed for anxiety     Medical Compression Stockings Misc  1 each by Does not apply route daily as needed (Wear Daily)     meloxicam 15 MG tablet  Commonly known as:

## 2024-08-30 NOTE — PROGRESS NOTES
Writer received call from Dr. Stanton. Patient is to see him for an outpatient visit Tuesday September 3rd at 10 am. Appointment added to follow up appointments on discharge summary.

## 2024-08-30 NOTE — PROGRESS NOTES
Physician Progress Note      PATIENT:               ROBERT CORTEZ  [facility] #:                  [id number]  :                       1970  ADMIT DATE:       2024 4:16 PM  DISCH DATE:  RESPONDING  PROVIDER #:        Talon Verdin MD          QUERY TEXT:    Patient admitted with COPD exacerbation. Documentation reflects Acute hypoxic   respiratory failure in ED note .  If possible, please document in the   progress notes and discharge summary if Acute hypoxic respiratory failure was:    The medical record reflects the following:  Risk Factors: COPD exacerbation, VANESSA.    Clinical Indicators: ED note on  Acute hypoxic respiratory failure  H&P note diminished with inspiratory and expiratory wheezing bilaterally, no   acute respiratory distress  SpO2 80%,  RR 25, 22  Treatment: 2-4L NC, XR chest,    Thank you,  Belinda King S CDS  Options provided:  -- Acute hypoxic respiratory failure confirmed after study  -- Acute hypoxic respiratory failure ruled out after study  -- Other - I will add my own diagnosis  -- Disagree - Not applicable / Not valid  -- Disagree - Clinically unable to determine / Unknown  -- Refer to Clinical Documentation Reviewer    PROVIDER RESPONSE TEXT:    Acute hypoxic respiratory failure confirmed after study.    Query created by: Belinda King on 2024 1:57 AM      Electronically signed by:  Talon Verdin MD 2024 5:00 AM

## 2024-08-30 NOTE — PROGRESS NOTES
[address] , [place], [place], [address]    Progress Note    Date:   8/30/2024  Patient name:  Yesi Zuluaga  Date of admission:  8/28/2024  4:16 PM  MRN:   [id number]  YOB: 1970    SUBJECTIVE/Last 24 hours update:     Patient seen and examined at the bed side , no new acute events overnight and, no new complains noted. She continues to have some troubles breathing. VSS, afebrile, currently on 2L of O2 via NC. She is not coughing up phlegm. Notes from nursing staff and Consults had been reviewed, and the overnight progress had been checked with the nursing staff as well. Plan for Cardiac Cath today.    REVIEW OF SYSTEMS:      CONSTITUTIONAL:  no fevers, no headcahes  EYES: negative for blury vision  HEENT: No headaches, No nasal congestion, no difficulty swallowing  RESPIRATORY: positive for dyspnea, no wheezing, no Cough  CARDIOVASCULAR: negative for chest pain, no palpitations  GASTROINTESTINAL: no nausea, no vomiting, no change in bowel habits, no abdominal pain   GENITOURINARY: negative for dysuria, no hematuria   MUSCULOSKELETAL: no joint pains, no muscle aches, no swelling of joints or extremities  NEUROLOGICAL: No  Weakness or numbness      PAST MEDICAL HISTORY:      has a past medical history of Anxiety, Congenital heart disease, COPD (chronic obstructive pulmonary disease) ([facility]), Depression, Family history of thyroid problem, Headache, Hypertension, Obesity, Osteoarthritis, Sleep apnea, and Type 2 diabetes mellitus without complication ([facility]).    PAST SURGICAL HISTORY:      has a past surgical history that includes Cholecystectomy.       SOCIAL HISTORY:      reports that she has been smoking cigarettes. She started smoking about 3 years ago. She has a 33.3 pack-year smoking history. She has never used smokeless tobacco. She reports that she does not currently use alcohol. She reports that she does not use drugs.    TOBACCO:   reports that she has been smoking  08/30/24 0552    nicotine (NICODERM CQ) 21 MG/24HR 1 patch  1 patch TransDERmal Daily Joanna Jaramillo, APRN - CNP        ARIPiprazole (ABILIFY) tablet 30 mg  30 mg Oral Once Alek Wheeler MD        aspirin EC tablet 81 mg  81 mg Oral Daily Alek Wheeler MD   81 mg at 08/29/24 0949    baclofen (LIORESAL) tablet 10 mg  10 mg Oral TID Alek Wheeler MD   10 mg at 08/29/24 2005    carvedilol (COREG) tablet 6.25 mg  6.25 mg Oral BID Alek Wheeler MD   6.25 mg at 08/29/24 2005    dilTIAZem (CARDIZEM CD) extended release capsule 180 mg  180 mg Oral Daily Alek Wheeler MD   180 mg at 08/29/24 0949    [START ON 9/4/2024] dulaglutide (TRULICITY) SC injection 0.75 mg (Patient Supplied)  0.75 mg SubCUTAneous Weekly Alek Wheeler MD        gabapentin (NEURONTIN) capsule 300 mg  300 mg Oral TID Alek Wheeler MD   300 mg at 08/29/24 2005    hydrOXYzine HCl (ATARAX) tablet 50 mg  50 mg Oral QHS PRN Alek Wheeler MD   50 mg at 08/29/24 2059    budesonide-formoterol (SYMBICORT) 80-4.5 MCG/ACT inhaler 2 puff  2 puff Inhalation BID RT Alek Wheeler MD   2 puff at 08/29/24 2044    spironolactone (ALDACTONE) tablet 25 mg  25 mg Oral Daily Alek Wheeler MD   25 mg at 08/29/24 0949    methylPREDNISolone sodium succ (SOLU-MEDROL) 125 mg in sterile water 2 mL injection  125 mg IntraVENous Daily Alek Wheeler MD   125 mg at 08/29/24 0949    sodium chloride flush 0.9 % injection 5-40 mL  5-40 mL IntraVENous 2 times per day Alek Wheeler MD   10 mL at 08/29/24 2006    sodium chloride flush 0.9 % injection 10 mL  10 mL IntraVENous PRN Alek Wheeler MD        0.9 % sodium chloride infusion   IntraVENous PRN Alek Wheeler MD        potassium chloride (KLOR-CON M) extended release tablet 40 mEq  40 mEq Oral PRN Alek Wheeler MD        Or    potassium bicarb-citric acid (EFFER-K) effervescent tablet 40 mEq  40 mEq Oral PRN Alek Wheeler MD        Or

## 2024-08-30 NOTE — DISCHARGE INSTR - DIET

## 2024-08-30 NOTE — PLAN OF CARE
Problem: Discharge Planning  Goal: Discharge to home or other facility with appropriate resources  Outcome: Progressing  Flowsheets (Taken 8/29/2024 1914)  Discharge to home or other facility with appropriate resources:   Identify barriers to discharge with patient and caregiver   Arrange for needed discharge resources and transportation as appropriate   Identify discharge learning needs (meds, wound care, etc)   Refer to discharge planning if patient needs post-hospital services based on physician order or complex needs related to functional status, cognitive ability or social support system     Problem: Safety - Adult  Goal: Free from fall injury  Outcome: Progressing  Flowsheets (Taken 8/30/2024 0519)  Free From Fall Injury:   Instruct family/caregiver on patient safety   Based on caregiver fall risk screen, instruct family/caregiver to ask for assistance with transferring infant if caregiver noted to have fall risk factors     Problem: Respiratory - Adult  Goal: Achieves optimal ventilation and oxygenation  Outcome: Progressing  Flowsheets (Taken 8/30/2024 0519)  Achieves optimal ventilation and oxygenation:   Assess for changes in respiratory status   Assess for changes in mentation and behavior   Position to facilitate oxygenation and minimize respiratory effort   Oxygen supplementation based on oxygen saturation or arterial blood gases   Initiate smoking cessation protocol as indicated   Assess and instruct to report shortness of breath or any respiratory difficulty     Problem: Cardiovascular - Adult  Goal: Maintains optimal cardiac output and hemodynamic stability  Outcome: Progressing  Flowsheets  Taken 8/30/2024 0519  Maintains optimal cardiac output and hemodynamic stability:   Monitor blood pressure and heart rate   Assess for signs of decreased cardiac output   Monitor urine output and notify Licensed Independent Practitioner for values outside of normal range  Taken 8/29/2024 1914  Maintains optimal  cardiac output and hemodynamic stability:   Monitor blood pressure and heart rate   Monitor urine output and notify Licensed Independent Practitioner for values outside of normal range   Assess for signs of decreased cardiac output

## 2024-08-30 NOTE — PROGRESS NOTES
Reviewed discharge instructions with patient.  Patient aware of need to  prescriptions.   Reviewed new medications and side effects to monitor for.   Reviewed home medications and when next dose is due.  Patient aware of date/time of follow up appointments.  Instructed to follow a low sodium diet.  Reviewed cardiac cath discharge instructions with patient as well.  Reviewed signs/symptoms of infection to monitor for and instructed to contact cardiology office in case of need or go to nearest ER.   Educational handout given on COPD Exacerbation.   Questions answered.   Verbalizes understanding.  Copy of discharge instructions given to patient.

## 2024-08-30 NOTE — PLAN OF CARE
Problem: Discharge Planning  Goal: Discharge to home or other facility with appropriate resources  8/30/2024 1253 by Simin Henriquez RN  Outcome: Adequate for Discharge  8/30/2024 0519 by Shani Rubi RN  Outcome: Progressing  Flowsheets (Taken 8/29/2024 1914)  Discharge to home or other facility with appropriate resources:   Identify barriers to discharge with patient and caregiver   Arrange for needed discharge resources and transportation as appropriate   Identify discharge learning needs (meds, wound care, etc)   Refer to discharge planning if patient needs post-hospital services based on physician order or complex needs related to functional status, cognitive ability or social support system     Problem: Safety - Adult  Goal: Free from fall injury  8/30/2024 1253 by Simin Henriquez RN  Outcome: Adequate for Discharge  8/30/2024 0519 by Shani Rubi RN  Outcome: Progressing  Flowsheets (Taken 8/30/2024 0519)  Free From Fall Injury:   Instruct family/caregiver on patient safety   Based on caregiver fall risk screen, instruct family/caregiver to ask for assistance with transferring infant if caregiver noted to have fall risk factors     Problem: Respiratory - Adult  Goal: Achieves optimal ventilation and oxygenation  8/30/2024 1253 by Simin Henriquez RN  Outcome: Adequate for Discharge  8/30/2024 0519 by Shani Rubi RN  Outcome: Progressing  Flowsheets (Taken 8/30/2024 0519)  Achieves optimal ventilation and oxygenation:   Assess for changes in respiratory status   Assess for changes in mentation and behavior   Position to facilitate oxygenation and minimize respiratory effort   Oxygen supplementation based on oxygen saturation or arterial blood gases   Initiate smoking cessation protocol as indicated   Assess and instruct to report shortness of breath or any respiratory difficulty     Problem: Cardiovascular - Adult  Goal: Maintains optimal cardiac output and hemodynamic  stability  8/30/2024 1253 by Simin Henriquez RN  Outcome: Adequate for Discharge  8/30/2024 0519 by Shani Rubi RN  Outcome: Progressing  Flowsheets  Taken 8/30/2024 0519  Maintains optimal cardiac output and hemodynamic stability:   Monitor blood pressure and heart rate   Assess for signs of decreased cardiac output   Monitor urine output and notify Licensed Independent Practitioner for values outside of normal range  Taken 8/29/2024 1914  Maintains optimal cardiac output and hemodynamic stability:   Monitor blood pressure and heart rate   Monitor urine output and notify Licensed Independent Practitioner for values outside of normal range   Assess for signs of decreased cardiac output     Problem: Chronic Conditions and Co-morbidities  Goal: Patient's chronic conditions and co-morbidity symptoms are monitored and maintained or improved  Outcome: Adequate for Discharge

## 2024-09-03 ENCOUNTER — OFFICE VISIT (OUTPATIENT)
Dept: PRIMARY CARE CLINIC | Age: 54
End: 2024-09-03
Payer: COMMERCIAL

## 2024-09-03 ENCOUNTER — TELEPHONE (OUTPATIENT)
Dept: PRIMARY CARE CLINIC | Age: 54
End: 2024-09-03

## 2024-09-03 VITALS
WEIGHT: 260.2 LBS | HEART RATE: 81 BPM | DIASTOLIC BLOOD PRESSURE: 76 MMHG | SYSTOLIC BLOOD PRESSURE: 130 MMHG | TEMPERATURE: 97.4 F | OXYGEN SATURATION: 95 % | RESPIRATION RATE: 16 BRPM | BODY MASS INDEX: 50.82 KG/M2

## 2024-09-03 DIAGNOSIS — J44.9 CHRONIC OBSTRUCTIVE PULMONARY DISEASE, UNSPECIFIED COPD TYPE (HCC): ICD-10-CM

## 2024-09-03 DIAGNOSIS — I10 PRIMARY HYPERTENSION: ICD-10-CM

## 2024-09-03 DIAGNOSIS — Z09 HOSPITAL DISCHARGE FOLLOW-UP: ICD-10-CM

## 2024-09-03 DIAGNOSIS — E11.9 TYPE 2 DIABETES MELLITUS WITHOUT COMPLICATION, WITHOUT LONG-TERM CURRENT USE OF INSULIN (HCC): Primary | ICD-10-CM

## 2024-09-03 DIAGNOSIS — M51.36 DEGENERATIVE DISC DISEASE, LUMBAR: ICD-10-CM

## 2024-09-03 PROCEDURE — 1111F DSCHRG MED/CURRENT MED MERGE: CPT | Performed by: NURSE PRACTITIONER

## 2024-09-03 PROCEDURE — 3044F HG A1C LEVEL LT 7.0%: CPT | Performed by: NURSE PRACTITIONER

## 2024-09-03 PROCEDURE — 99214 OFFICE O/P EST MOD 30 MIN: CPT | Performed by: NURSE PRACTITIONER

## 2024-09-03 PROCEDURE — 3078F DIAST BP <80 MM HG: CPT | Performed by: NURSE PRACTITIONER

## 2024-09-03 PROCEDURE — 3075F SYST BP GE 130 - 139MM HG: CPT | Performed by: NURSE PRACTITIONER

## 2024-09-03 RX ORDER — DULAGLUTIDE 0.75 MG/.5ML
0.75 INJECTION, SOLUTION SUBCUTANEOUS WEEKLY
Qty: 2 ML | Refills: 2 | Status: SHIPPED | OUTPATIENT
Start: 2024-09-03 | End: 2024-10-03

## 2024-09-03 RX ORDER — MELOXICAM 15 MG/1
15 TABLET ORAL DAILY
Qty: 90 TABLET | Refills: 1 | Status: SHIPPED | OUTPATIENT
Start: 2024-09-03

## 2024-09-03 ASSESSMENT — PATIENT HEALTH QUESTIONNAIRE - PHQ9
7. TROUBLE CONCENTRATING ON THINGS, SUCH AS READING THE NEWSPAPER OR WATCHING TELEVISION: NOT AT ALL
8. MOVING OR SPEAKING SO SLOWLY THAT OTHER PEOPLE COULD HAVE NOTICED. OR THE OPPOSITE, BEING SO FIGETY OR RESTLESS THAT YOU HAVE BEEN MOVING AROUND A LOT MORE THAN USUAL: NOT AT ALL
SUM OF ALL RESPONSES TO PHQ QUESTIONS 1-9: 0
SUM OF ALL RESPONSES TO PHQ9 QUESTIONS 1 & 2: 0
2. FEELING DOWN, DEPRESSED OR HOPELESS: NOT AT ALL
SUM OF ALL RESPONSES TO PHQ QUESTIONS 1-9: 0
9. THOUGHTS THAT YOU WOULD BE BETTER OFF DEAD, OR OF HURTING YOURSELF: NOT AT ALL
SUM OF ALL RESPONSES TO PHQ QUESTIONS 1-9: 0
10. IF YOU CHECKED OFF ANY PROBLEMS, HOW DIFFICULT HAVE THESE PROBLEMS MADE IT FOR YOU TO DO YOUR WORK, TAKE CARE OF THINGS AT HOME, OR GET ALONG WITH OTHER PEOPLE: NOT DIFFICULT AT ALL
5. POOR APPETITE OR OVEREATING: NOT AT ALL
SUM OF ALL RESPONSES TO PHQ QUESTIONS 1-9: 0
3. TROUBLE FALLING OR STAYING ASLEEP: NOT AT ALL
1. LITTLE INTEREST OR PLEASURE IN DOING THINGS: NOT AT ALL
6. FEELING BAD ABOUT YOURSELF - OR THAT YOU ARE A FAILURE OR HAVE LET YOURSELF OR YOUR FAMILY DOWN: NOT AT ALL
4. FEELING TIRED OR HAVING LITTLE ENERGY: NOT AT ALL

## 2024-09-03 NOTE — TELEPHONE ENCOUNTER
[facility] Transitions Initial Follow Up Call    Outreach made within 2 business days of discharge: Yes    Patient: Yesi Zuluaga Patient : 1970   MRN: [id number]  Reason for Admission: There are no discharge diagnoses documented for the most recent discharge.  Discharge Date: 24       Spoke with: Yesi    Discharge department/facility: [facility]     TCM Interactive Patient Contact:  Was patient able to fill all prescriptions: Yes  Was patient instructed to bring all medications to the follow-up visit: Yes  Is patient taking all medications as directed in the discharge summary? Yes  Does patient understand their discharge instructions: No:   Does patient have questions or concerns that need addressed prior to 7-14 day follow up office visit: no    Scheduled appointment with PCP within 7-14 days    Follow Up  Future Appointments   Date Time Provider Department Center   9/3/2024  3:40 PM Kathie Jacobson APRN - CNP [facility] DEP   2024  4:20 PM Kathie Jacobson APRN - CNP [facility] DEP   10/3/2024  2:30 PM Prabha Du PA-C [facility]   10/28/2024 10:30 AM Juan Pablo Lombardo MD [facility]       Rosmery Morales MA

## 2024-09-03 NOTE — PATIENT INSTRUCTIONS
SURVEY:     You may be receiving a survey from [facility] [employer] regarding your visit today.     Please complete the survey to enable us to provide the highest quality of care to you and your family.     If you cannot score us a very good on any question, please call the office to discuss how we could have made your experience a very good one.     Thank you,    Jaison Rothman, APRN-CNP  Kathie Jacobson, APRN-CNP  Elaine, LPN  Sarah, CMA  Rudolph, CMA  Rosmery, CMA  Cookie, PCA  Casie, CMA  Nithya, PM

## 2024-09-03 NOTE — PROGRESS NOTES
Post-Discharge Transitional Care  Follow Up      Yesi Zuluaga   YOB: 1970    Date of Office Visit:  9/3/2024  Date of Hospital Admission: 8/28/24  Date of Hospital Discharge: 8/30/24  Risk of hospital readmission (high >=14%. Medium >=10%) :Readmission Risk Score: 10.4      Care management risk score Rising risk (score 2-5) and Complex Care (Scores >=6): No Risk Score On File     Non face to face  following discharge, date last encounter closed (first attempt may have been earlier): *No documented post hospital discharge outreach found in the last 14 days    Call initiated 2 business days of discharge: *No response recorded in the last 14 days    ASSESSMENT/PLAN:   Type 2 diabetes mellitus without complication, without long-term current use of insulin (HCC)  -     dulaglutide (TRULICITY) 0.75 MG/0.5ML SOPN SC injection; Inject 0.5 mLs into the skin once a week, Disp-2 mL, R-2Normal  Degenerative disc disease, lumbar  -     meloxicam (MOBIC) 15 MG tablet; Take 1 tablet by mouth daily, Disp-90 tablet, R-1Normal  Primary hypertension  Chronic obstructive pulmonary disease, unspecified COPD type (HCC)  -     mometasone-formoterol (DULERA) 200-5 MCG/ACT inhaler; Inhale 2 puffs into the lungs in the morning and 2 puffs in the evening., Disp-1 each, R-2Normal      Medical Decision Making: low complexity  No follow-ups on file.    On this date 9/3/2024 I have spent 30 minutes reviewing previous notes, test results and face to face with the patient discussing the diagnosis and importance of compliance with the treatment plan as well as documenting on the day of the visit.       Subjective:   HPI:  Follow up of Hospital problems/diagnosis(es): COPD exacerbation.    Caro states she has been feeling good.  She states she has been having difficulty with her blood sugars running high.  Trulicity was discontinued after hospital admission.  She states her blood sugars have been running in the 240's and was 246

## 2024-09-09 ENCOUNTER — TELEPHONE (OUTPATIENT)
Dept: PRIMARY CARE CLINIC | Age: 54
End: 2024-09-09

## 2024-10-10 DIAGNOSIS — F41.9 ANXIETY: Primary | ICD-10-CM

## 2024-10-10 RX ORDER — HYDROXYZINE HYDROCHLORIDE 50 MG/1
50 TABLET, FILM COATED ORAL NIGHTLY
Qty: 90 TABLET | Refills: 0 | Status: SHIPPED | OUTPATIENT
Start: 2024-10-10

## 2024-10-10 NOTE — TELEPHONE ENCOUNTER
Health Maintenance   Topic Date Due    Colorectal Cancer Screen  02/15/2023    COVID-19 Vaccine (5 - 2023-24 season) 09/01/2024    Flu vaccine (1) 10/28/2024 (Originally 8/1/2024)    DTaP/Tdap/Td vaccine (1 - Tdap) 12/07/2024 (Originally 11/17/1991)    Pneumococcal 0-64 years Vaccine (1 of 2 - PCV) 12/07/2024 (Originally 11/17/1978)    Hepatitis C screen  12/07/2024 (Originally 11/17/1990)    HIV screen  12/07/2024 (Originally 11/17/1987)    Hepatitis B vaccine (1 of 3 - 19+ 3-dose series) 03/29/2025 (Originally 11/17/1991)    Shingles vaccine (1 of 2) 03/29/2025 (Originally 11/17/2022)    Diabetic foot exam  08/28/2025 (Originally 11/17/1982)    Diabetic Alb to Cr ratio (uACR) test  08/28/2025 (Originally 11/17/1990)    Cervical cancer screen  08/28/2025 (Originally 11/17/2002)    Diabetic retinal exam  09/07/2025 (Originally 11/17/1990)    Lung Cancer Screening &/or Counseling  06/06/2025    A1C test (Diabetic or Prediabetic)  07/02/2025    Lipids  08/30/2025    GFR test (Diabetes, CKD 3-4, OR last GFR 15-59)  08/30/2025    Depression Monitoring  09/03/2025    Breast cancer screen  05/08/2026    Hepatitis A vaccine  Aged Out    Hib vaccine  Aged Out    Polio vaccine  Aged Out    Meningococcal (ACWY) vaccine  Aged Out    Diabetes screen  Discontinued             (applicable per patient's age: Cancer Screenings, Depression Screening, Fall Risk Screening, Immunizations)    Hemoglobin A1C (%)   Date Value   07/02/2024 5.8   12/01/2023 6.0   02/07/2023 5.7     AST (U/L)   Date Value   08/30/2024 18     ALT (U/L)   Date Value   08/30/2024 20     BUN (mg/dL)   Date Value   08/30/2024 18      (goal A1C is < 7)   (goal LDL is <100) need 30-50% reduction from baseline     BP Readings from Last 3 Encounters:   09/03/24 130/76   08/30/24 123/84   08/27/24 (!) 142/94    (goal /80)      All Future Testing planned in [facility]:  Lab Frequency Next Occurrence   Sleep Study with PAP Titration Once 10/11/2023   POCT Fecal

## 2024-12-11 DIAGNOSIS — F31.9 BIPOLAR DEPRESSION (HCC): ICD-10-CM

## 2024-12-11 RX ORDER — ARIPIPRAZOLE 30 MG/1
30 TABLET ORAL DAILY
Qty: 90 TABLET | Refills: 1 | Status: SHIPPED | OUTPATIENT
Start: 2024-12-11

## 2024-12-11 NOTE — TELEPHONE ENCOUNTER
Health Maintenance   Topic Date Due    Pneumococcal 0-64 years Vaccine (1 of 2 - PCV) Never done    HIV screen  Never done    Hepatitis C screen  Never done    DTaP/Tdap/Td vaccine (1 - Tdap) Never done    Colorectal Cancer Screen  02/15/2023    Flu vaccine (1) 08/01/2024    COVID-19 Vaccine (5 - 2023-24 season) 09/01/2024    Hepatitis B vaccine (1 of 3 - 19+ 3-dose series) 03/29/2025 (Originally 11/17/1991)    Shingles vaccine (1 of 2) 03/29/2025 (Originally 11/17/2022)    Diabetic foot exam  08/28/2025 (Originally 11/17/1982)    Diabetic Alb to Cr ratio (uACR) test  08/28/2025 (Originally 11/17/1990)    Cervical cancer screen  08/28/2025 (Originally 11/17/2002)    Diabetic retinal exam  09/07/2025 (Originally 11/17/1990)    Lung Cancer Screening &/or Counseling  06/06/2025    A1C test (Diabetic or Prediabetic)  07/02/2025    Lipids  08/30/2025    GFR test (Diabetes, CKD 3-4, OR last GFR 15-59)  08/30/2025    Depression Monitoring  09/03/2025    Breast cancer screen  05/08/2026    Hepatitis A vaccine  Aged Out    Hib vaccine  Aged Out    Polio vaccine  Aged Out    Meningococcal (ACWY) vaccine  Aged Out    Diabetes screen  Discontinued             (applicable per patient's age: Cancer Screenings, Depression Screening, Fall Risk Screening, Immunizations)    Hemoglobin A1C (%)   Date Value   07/02/2024 5.8   12/01/2023 6.0   02/07/2023 5.7     AST (U/L)   Date Value   08/30/2024 18     ALT (U/L)   Date Value   08/30/2024 20     BUN (mg/dL)   Date Value   08/30/2024 18      (goal A1C is < 7)   (goal LDL is <100) need 30-50% reduction from baseline     BP Readings from Last 3 Encounters:   09/03/24 130/76   08/30/24 123/84   08/27/24 (!) 142/94    (goal /80)      All Future Testing planned in [facility]:  Lab Frequency Next Occurrence   POCT Fecal Immunochemical Test (FIT) Once 04/19/2024   Comprehensive Metabolic Panel Once 01/05/2025   6 Minute Walk Test Once 07/12/2024   Echo (TTE) complete (PRN

## 2025-01-03 NOTE — PROGRESS NOTES
Behavioral Health Consultation  Obi Valentine, MSN, APRN-CNP, PMHNP-BC  6/4/2021, 8:33 AM      Time spent with Patient:  15 minutes  This was a telehealth visit. Patient Location: Home. Provider Location: Home office in [place], [place]  This virtual visit was conducted via interactive/real-time audio/video. Chief Complaint:follow up for depression and anxiety. [place]:  Reason for visit is medication management follow up. She has been compliant with medications. Pt reports that medications have  been working. Parker Maher states that she feels her \"moods are stable. \"  Parker Maher rates her anxiety and depression is around a 2-3 out of ten, with ten being the worst. Pt denies side effects from medications. Pt denies hallucinations. Pt reports there has been no changes to appetite. Pt reports sleep has been alright. Pt denies  current exercise. Pt denies current suicidal ideation, plan and intent. Pt  denies current homicidal ideation, plan and [email or web address]). Social:engaged. Working currently, at [employer] full time. Denies any THC use, reports alcohol us about once a month. Has attended some college. Past Psychiatric history:   The patient has a history of  bipolar disorder and attempted suicide. Previous treatment has included: Prozac- felt this one worked well, Zoloft- felt sleepy with this, Wellbutrin- can't remember much about this one, mood stabilizer- abilify- felt this one worked, felt this one worked for her. sleep aid- trazodone, and Verba Cease felt it stopped working after a couple of weeks and individual therapy- hasn't seen a therapist in six years. Family Mental Health history:   Pertinent family history: bipolar disorder. Father and Paternal grandmother bipolar. MSE:    Appearance: alert, cooperative  Attention:Intact  Appetite: normal  Ambulation: unable to assess.    Sleep disturbance: Yes  Loss of pleasure: Yes  Speech: spontaneous, normal rate, normal volume and well articulated Mood: \"good\"   Affect: normal affect  Thought Content: intact  Insight: Fair  Judgment: Intact  Memory: Intact long-term and Intact short-term  Suicide Assessment: no suicidal ideation  Homicide Assessment: denies current homicidal ideation, plan and intent    History:      Review of Systems:   Constitutional: negative  HENT: negative  Eyes: negative  Respiratory: negative  Cardiovascular: negative  Gastrointestinal: negative  Genitourinary: negative  Musculoskeletal: negative  Skin:negative  Neurological:negative  Endo/Heme/Allergies:negative       Current Outpatient Medications:     FLUoxetine (PROZAC) 20 MG capsule, Take 1 capsule by mouth daily, Disp: 30 capsule, Rfl: 0    hydrOXYzine (ATARAX) 25 MG tablet, Take 1 tablet by mouth 3 times daily as needed for Anxiety, Disp: 90 tablet, Rfl: 0    celecoxib (CELEBREX) 100 MG capsule, Take 1 capsule by mouth 2 times daily, Disp: 60 capsule, Rfl: 1    lisinopril-hydroCHLOROthiazide (PRINZIDE;ZESTORETIC) 10-12.5 MG per tablet, Take 1 tablet by mouth daily, Disp: 30 tablet, Rfl: 5    methocarbamol (ROBAXIN) 500 MG tablet, 1 to 2 pills by mouth 4 times daily as needed muscle pain/spasm, Disp: 56 tablet, Rfl: 1    albuterol sulfate HFA (VENTOLIN HFA) 108 (90 Base) MCG/ACT inhaler, Inhale 2 puffs into the lungs 4 times daily as needed for Wheezing (Patient not taking: Reported on 5/14/2021), Disp: 1 Inhaler, Rfl: 0     PDMP Monitoring:    Last PDMP Alek as Reviewed [facility]):  Review User Review Instant Review Result   Preeti Lloyd 6/4/2021  8:28 AM Reviewed PDMP [1]     Last Controlled Substance Monitoring Documentation      Telemedicine from 5/19/2021 in [address]   Periodic Controlled Substance Monitoring  No signs of potential drug abuse or diversion identified. filed at 05/19/2021 0849        Urine Drug Screenings (1 yr)    No resulted procedures found.        Medication Contract and Consent for Opioid Use Documents Filed      No documents Anxiety     Depression     Family history of thyroid problem     Hypertension     Osteoarthritis       Metabolic monitoring is being done by PCP. Family History   Problem Relation Age of Onset    Other Mother     Diabetes Father     Heart Disease Father     Cancer Maternal Grandfather      Last Labs:   Lab Results   Component Value Date    LABA1C 5.9 05/20/2021     Lab Results   Component Value Date     05/20/2021      Lab Results   Component Value Date    WBC 11.2 03/15/2021    HGB 16.2 (H) 03/15/2021    HCT 49.9 (H) 03/15/2021    MCV 92.2 03/15/2021     03/15/2021    LYMPHOPCT 25 03/15/2021    RBC 5.41 (H) 03/15/2021    MCH 29.9 03/15/2021    MCHC 32.5 03/15/2021    RDW 15.8 (H) 03/15/2021          Lab Results   Component Value Date     03/15/2021    K 4.0 03/15/2021     03/15/2021    CO2 27 03/15/2021    BUN 14 03/15/2021    CREATININE 0.88 03/15/2021    GLUCOSE 102 (H) 03/15/2021    CALCIUM 10.0 03/15/2021    LABGLOM >60 03/15/2021    GFRAA >60 03/15/2021      . last    Diagnosis:      1. Major depressive disorder, recurrent episode with anxious distress ([facility] [address]      Plan:    Discussed keeping meds the same. Discussed risks and benefits of medications, as well as need for yearly lab work. Follow up with [facility] for continued therapy. Continue work with PCP to manage medical concerns, and [facility] for continued follow-up. No orders of the defined types were placed in this encounter.      Orders Placed This Encounter   Medications    FLUoxetine (PROZAC) 20 MG capsule     Sig: Take 1 capsule by mouth daily     Dispense:  30 capsule     Refill:  0    hydrOXYzine (ATARAX) 25 MG tablet     Sig: Take 1 tablet by mouth 3 times daily as needed for Anxiety     Dispense:  90 tablet     Refill:  0       Pt interventions:    Discussed importance of medication adherence, Discussed risks, benefits, side effects of medication and need for follow up treatment, Discussed benefits of referral for specialty care and Discussed self-care (sleep, nutrition, rewarding activities, social support, exercise) 155

## 2025-01-09 DIAGNOSIS — M51.369 DEGENERATIVE DISC DISEASE, LUMBAR: ICD-10-CM

## 2025-01-09 RX ORDER — GABAPENTIN 300 MG/1
300 CAPSULE ORAL 3 TIMES DAILY
Qty: 180 CAPSULE | Refills: 2 | Status: SHIPPED | OUTPATIENT
Start: 2025-01-09 | End: 2025-07-08

## 2025-01-09 NOTE — TELEPHONE ENCOUNTER
Health Maintenance   Topic Date Due    Pneumococcal 0-64 years Vaccine (1 of 2 - PCV) Never done    HIV screen  Never done    Hepatitis C screen  Never done    DTaP/Tdap/Td vaccine (1 - Tdap) Never done    Colorectal Cancer Screen  02/15/2023    Flu vaccine (1) 08/01/2024    COVID-19 Vaccine (5 - 2023-24 season) 09/01/2024    Hepatitis B vaccine (1 of 3 - 19+ 3-dose series) 03/29/2025 (Originally 11/17/1991)    Shingles vaccine (1 of 2) 03/29/2025 (Originally 11/17/2022)    Diabetic foot exam  08/28/2025 (Originally 11/17/1982)    Diabetic Alb to Cr ratio (uACR) test  08/28/2025 (Originally 11/17/1990)    Cervical cancer screen  08/28/2025 (Originally 11/17/2002)    Lung Cancer Screening &/or Counseling  06/06/2025    A1C test (Diabetic or Prediabetic)  07/02/2025    Lipids  08/30/2025    GFR test (Diabetes, CKD 3-4, OR last GFR 15-59)  08/30/2025    Depression Monitoring  09/03/2025    Diabetic retinal exam  01/03/2026    Breast cancer screen  05/08/2026    Hepatitis A vaccine  Aged Out    Hib vaccine  Aged Out    Polio vaccine  Aged Out    Meningococcal (ACWY) vaccine  Aged Out    Diabetes screen  Discontinued             (applicable per patient's age: Cancer Screenings, Depression Screening, Fall Risk Screening, Immunizations)    Hemoglobin A1C (%)   Date Value   07/02/2024 5.8   12/01/2023 6.0   02/07/2023 5.7     AST (U/L)   Date Value   08/30/2024 18     ALT (U/L)   Date Value   08/30/2024 20     BUN (mg/dL)   Date Value   08/30/2024 18      (goal A1C is < 7)   (goal LDL is <100) need 30-50% reduction from baseline     BP Readings from Last 3 Encounters:   09/03/24 130/76   08/30/24 123/84   08/27/24 (!) 142/94    (goal /80)      All Future Testing planned in [facility]:  Lab Frequency Next Occurrence   POCT Fecal Immunochemical Test (FIT) Once 04/19/2024   Comprehensive Metabolic Panel Once 01/05/2025   6 Minute Walk Test Once 07/12/2024   Echo (TTE) complete (PRN contrast/bubble/strain/3D) Once

## 2025-02-03 ASSESSMENT — PATIENT HEALTH QUESTIONNAIRE - PHQ9
3. TROUBLE FALLING OR STAYING ASLEEP: SEVERAL DAYS
1. LITTLE INTEREST OR PLEASURE IN DOING THINGS: SEVERAL DAYS
4. FEELING TIRED OR HAVING LITTLE ENERGY: SEVERAL DAYS
6. FEELING BAD ABOUT YOURSELF - OR THAT YOU ARE A FAILURE OR HAVE LET YOURSELF OR YOUR FAMILY DOWN: SEVERAL DAYS
SUM OF ALL RESPONSES TO PHQ QUESTIONS 1-9: 8
7. TROUBLE CONCENTRATING ON THINGS, SUCH AS READING THE NEWSPAPER OR WATCHING TELEVISION: SEVERAL DAYS
SUM OF ALL RESPONSES TO PHQ QUESTIONS 1-9: 8
SUM OF ALL RESPONSES TO PHQ9 QUESTIONS 1 & 2: 2
4. FEELING TIRED OR HAVING LITTLE ENERGY: SEVERAL DAYS
10. IF YOU CHECKED OFF ANY PROBLEMS, HOW DIFFICULT HAVE THESE PROBLEMS MADE IT FOR YOU TO DO YOUR WORK, TAKE CARE OF THINGS AT HOME, OR GET ALONG WITH OTHER PEOPLE: SOMEWHAT DIFFICULT
8. MOVING OR SPEAKING SO SLOWLY THAT OTHER PEOPLE COULD HAVE NOTICED. OR THE OPPOSITE, BEING SO FIGETY OR RESTLESS THAT YOU HAVE BEEN MOVING AROUND A LOT MORE THAN USUAL: SEVERAL DAYS
6. FEELING BAD ABOUT YOURSELF - OR THAT YOU ARE A FAILURE OR HAVE LET YOURSELF OR YOUR FAMILY DOWN: SEVERAL DAYS
9. THOUGHTS THAT YOU WOULD BE BETTER OFF DEAD, OR OF HURTING YOURSELF: NOT AT ALL
8. MOVING OR SPEAKING SO SLOWLY THAT OTHER PEOPLE COULD HAVE NOTICED. OR THE OPPOSITE - BEING SO FIDGETY OR RESTLESS THAT YOU HAVE BEEN MOVING AROUND A LOT MORE THAN USUAL: SEVERAL DAYS
SUM OF ALL RESPONSES TO PHQ QUESTIONS 1-9: 8
9. THOUGHTS THAT YOU WOULD BE BETTER OFF DEAD, OR OF HURTING YOURSELF: NOT AT ALL
SUM OF ALL RESPONSES TO PHQ QUESTIONS 1-9: 8
SUM OF ALL RESPONSES TO PHQ QUESTIONS 1-9: 8
5. POOR APPETITE OR OVEREATING: SEVERAL DAYS
2. FEELING DOWN, DEPRESSED OR HOPELESS: SEVERAL DAYS
3. TROUBLE FALLING OR STAYING ASLEEP: SEVERAL DAYS
7. TROUBLE CONCENTRATING ON THINGS, SUCH AS READING THE NEWSPAPER OR WATCHING TELEVISION: SEVERAL DAYS
2. FEELING DOWN, DEPRESSED OR HOPELESS: SEVERAL DAYS
1. LITTLE INTEREST OR PLEASURE IN DOING THINGS: SEVERAL DAYS
10. IF YOU CHECKED OFF ANY PROBLEMS, HOW DIFFICULT HAVE THESE PROBLEMS MADE IT FOR YOU TO DO YOUR WORK, TAKE CARE OF THINGS AT HOME, OR GET ALONG WITH OTHER PEOPLE: SOMEWHAT DIFFICULT
5. POOR APPETITE OR OVEREATING: SEVERAL DAYS

## 2025-02-04 ENCOUNTER — OFFICE VISIT (OUTPATIENT)
Dept: PRIMARY CARE CLINIC | Age: 55
End: 2025-02-04
Payer: COMMERCIAL

## 2025-02-04 VITALS
HEART RATE: 71 BPM | RESPIRATION RATE: 18 BRPM | TEMPERATURE: 97 F | SYSTOLIC BLOOD PRESSURE: 128 MMHG | DIASTOLIC BLOOD PRESSURE: 82 MMHG | OXYGEN SATURATION: 95 % | HEIGHT: 60 IN | WEIGHT: 270.4 LBS | BODY MASS INDEX: 53.09 KG/M2

## 2025-02-04 DIAGNOSIS — F31.9 BIPOLAR DEPRESSION (HCC): ICD-10-CM

## 2025-02-04 DIAGNOSIS — E11.9 TYPE 2 DIABETES MELLITUS WITHOUT COMPLICATION, WITHOUT LONG-TERM CURRENT USE OF INSULIN (HCC): Primary | ICD-10-CM

## 2025-02-04 DIAGNOSIS — F41.9 ANXIETY: ICD-10-CM

## 2025-02-04 DIAGNOSIS — I10 PRIMARY HYPERTENSION: ICD-10-CM

## 2025-02-04 DIAGNOSIS — E78.1 HIGH TRIGLYCERIDES: ICD-10-CM

## 2025-02-04 DIAGNOSIS — J44.9 CHRONIC OBSTRUCTIVE PULMONARY DISEASE, UNSPECIFIED COPD TYPE (HCC): ICD-10-CM

## 2025-02-04 LAB — HBA1C MFR BLD: 5.9 %

## 2025-02-04 PROCEDURE — 99214 OFFICE O/P EST MOD 30 MIN: CPT | Performed by: NURSE PRACTITIONER

## 2025-02-04 PROCEDURE — 83036 HEMOGLOBIN GLYCOSYLATED A1C: CPT | Performed by: NURSE PRACTITIONER

## 2025-02-04 PROCEDURE — 3079F DIAST BP 80-89 MM HG: CPT | Performed by: NURSE PRACTITIONER

## 2025-02-04 PROCEDURE — 3074F SYST BP LT 130 MM HG: CPT | Performed by: NURSE PRACTITIONER

## 2025-02-04 PROCEDURE — 3044F HG A1C LEVEL LT 7.0%: CPT | Performed by: NURSE PRACTITIONER

## 2025-02-04 RX ORDER — ARIPIPRAZOLE 30 MG/1
30 TABLET ORAL DAILY
Qty: 90 TABLET | Refills: 1 | Status: SHIPPED | OUTPATIENT
Start: 2025-02-04

## 2025-02-04 RX ORDER — HYDROXYZINE HYDROCHLORIDE 50 MG/1
50 TABLET, FILM COATED ORAL NIGHTLY
Qty: 90 TABLET | Refills: 0 | Status: SHIPPED | OUTPATIENT
Start: 2025-02-04

## 2025-02-04 NOTE — PROGRESS NOTES
[facility] [place] PHYSICIANS  SAUD SHEN CNP  [facility] PRIMARY CARE  [address] [place] [id number]  Dept: [phone]  Dept Fax: [phone]      Name: Yesi Zuluaga  : 1970         Chief Complaint:     Chief Complaint   Patient presents with    Diabetes    Weight Management     Would like to discuss weight loss medications.        History of Present Illness:      Yesi Zuluaga is a 54 y.o.  female who presents with Diabetes and Weight Management (Would like to discuss weight loss medications. )      HPI    Yesi is here today for a routine office visit.  She has been doing the Keto diet and has lost 30 pounds in 6 weeks.  She is feeling better.  She has had an improvement in her breathing.  She has not taken her Truliciy in 3 months.  She has been checking her blood sugars at home and they have been good.  She is no longer eating sweets or carbs with the Keto diet she is on.  She has started doing some exercises at home.      She is doing good with the Dulera inhaler and has not had to use her Albuterol as much.  She is smoking 3-5 cigarettes a day.  She had quit smoking but has resumed her smoking to help decrease her appetite.      She would like a medication to help with her weight loss.    She was hospitalized in September for COPD exacerbation and also had a cardiac cath and Echo completed.  She has not followed up with Cardiology.  She missed her Pulmonology appointment due to being out of town.  She was referred to Orthopedics from hospital but did not follow up.    A1C 5.9 today.    Past Medical History:     Past Medical History:   Diagnosis Date    Anxiety     Chronic back pain     Congenital heart disease     COPD (chronic obstructive pulmonary disease) ([facility])     Depression     Family history of thyroid problem     Headache     Hypertension     Obesity     Osteoarthritis     Sleep apnea     Type 2 diabetes mellitus without complication ([facility])       Reviewed all health maintenance

## 2025-02-05 ASSESSMENT — ENCOUNTER SYMPTOMS
EYES NEGATIVE: 1
RESPIRATORY NEGATIVE: 1
GASTROINTESTINAL NEGATIVE: 1
ALLERGIC/IMMUNOLOGIC NEGATIVE: 1

## 2025-02-21 ENCOUNTER — HOSPITAL ENCOUNTER (OUTPATIENT)
Age: 55
Discharge: HOME | End: 2025-02-21
Payer: COMMERCIAL

## 2025-02-21 DIAGNOSIS — E78.1 HIGH TRIGLYCERIDES: ICD-10-CM

## 2025-02-21 DIAGNOSIS — E11.9 TYPE 2 DIABETES MELLITUS WITHOUT COMPLICATION, WITHOUT LONG-TERM CURRENT USE OF INSULIN (HCC): ICD-10-CM

## 2025-02-21 DIAGNOSIS — I10 PRIMARY HYPERTENSION: ICD-10-CM

## 2025-02-21 LAB
ALBUMIN SERPL-MCNC: 3.9 G/DL (ref 3.5–5.2)
ALBUMIN/GLOB SERPL: 1.1 {RATIO} (ref 1–2.5)
ALP SERPL-CCNC: 120 U/L (ref 35–104)
ALT SERPL-CCNC: 47 U/L (ref 10–35)
ANION GAP SERPL CALCULATED.3IONS-SCNC: 12 MMOL/L (ref 9–16)
AST SERPL-CCNC: 30 U/L (ref 10–35)
BASOPHILS # BLD: 0.08 K/UL (ref 0–0.2)
BASOPHILS NFR BLD: 1 % (ref 0–2)
BILIRUB SERPL-MCNC: 0.6 MG/DL (ref 0–1.2)
BUN SERPL-MCNC: 15 MG/DL (ref 6–20)
BUN/CREAT SERPL: 19 (ref 9–20)
CALCIUM SERPL-MCNC: 9.7 MG/DL (ref 8.6–10.4)
CHLORIDE SERPL-SCNC: 103 MMOL/L (ref 98–107)
CO2 SERPL-SCNC: 22 MMOL/L (ref 20–31)
CREAT SERPL-MCNC: 0.8 MG/DL (ref 0.5–0.9)
EOSINOPHIL # BLD: 0.18 K/UL (ref 0–0.44)
EOSINOPHILS RELATIVE PERCENT: 2 % (ref 1–4)
ERYTHROCYTE [DISTWIDTH] IN BLOOD BY AUTOMATED COUNT: 16.3 % (ref 11.8–14.4)
GFR, ESTIMATED: 87 ML/MIN/1.73M2
GLUCOSE P FAST SERPL-MCNC: 89 MG/DL (ref 74–99)
HCT VFR BLD AUTO: 56.1 % (ref 36.3–47.1)
HGB BLD-MCNC: 18.6 G/DL (ref 11.9–15.1)
IMM GRANULOCYTES # BLD AUTO: 0.03 K/UL (ref 0–0.3)
IMM GRANULOCYTES NFR BLD: 0 %
LYMPHOCYTES NFR BLD: 2.88 K/UL (ref 1.1–3.7)
LYMPHOCYTES RELATIVE PERCENT: 36 % (ref 24–43)
MCH RBC QN AUTO: 30 PG (ref 25.2–33.5)
MCHC RBC AUTO-ENTMCNC: 33.2 G/DL (ref 28.4–34.8)
MCV RBC AUTO: 90.5 FL (ref 82.6–102.9)
MONOCYTES NFR BLD: 0.9 K/UL (ref 0.1–1.2)
MONOCYTES NFR BLD: 11 % (ref 3–12)
NEUTROPHILS NFR BLD: 50 % (ref 36–65)
NEUTS SEG NFR BLD: 4.05 K/UL (ref 1.5–8.1)
NRBC BLD-RTO: 0 PER 100 WBC
PLATELET # BLD AUTO: 283 K/UL (ref 138–453)
PMV BLD AUTO: 10.5 FL (ref 8.1–13.5)
POTASSIUM SERPL-SCNC: 4.4 MMOL/L (ref 3.7–5.3)
PROT SERPL-MCNC: 7.3 G/DL (ref 6.6–8.7)
RBC # BLD AUTO: 6.2 M/UL (ref 3.95–5.11)
SODIUM SERPL-SCNC: 137 MMOL/L (ref 136–145)
WBC OTHER # BLD: 8.1 K/UL (ref 3.5–11.3)

## 2025-02-21 PROCEDURE — 36415 COLL VENOUS BLD VENIPUNCTURE: CPT

## 2025-02-21 PROCEDURE — 82043 UR ALBUMIN QUANTITATIVE: CPT

## 2025-02-21 PROCEDURE — 85025 COMPLETE CBC W/AUTO DIFF WBC: CPT

## 2025-02-21 PROCEDURE — 80061 LIPID PANEL: CPT

## 2025-02-21 PROCEDURE — 82570 ASSAY OF URINE CREATININE: CPT

## 2025-02-21 PROCEDURE — 80053 COMPREHEN METABOLIC PANEL: CPT

## 2025-02-22 LAB
CHOLEST SERPL-MCNC: 148 MG/DL (ref 0–199)
CHOLESTEROL/HDL RATIO: 3.9
CREAT UR-MCNC: 27.2 MG/DL (ref 28–217)
HDLC SERPL-MCNC: 38 MG/DL
LDLC SERPL CALC-MCNC: 93 MG/DL (ref 0–100)
MICROALBUMIN UR-MCNC: 13 MG/L (ref 0–20)
MICROALBUMIN/CREAT UR-RTO: 48 MCG/MG CREAT (ref 0–25)
TRIGL SERPL-MCNC: 87 MG/DL
VLDLC SERPL CALC-MCNC: 17 MG/DL (ref 1–30)

## 2025-02-24 ENCOUNTER — TELEPHONE (OUTPATIENT)
Dept: PRIMARY CARE CLINIC | Age: 55
End: 2025-02-24

## 2025-02-24 NOTE — TELEPHONE ENCOUNTER
----- Message from REBEKAH Valente CNP sent at 2/24/2025  1:33 PM EST -----  Please notify patient of stable lab results.  Thanks Kathie

## 2025-03-10 ENCOUNTER — APPOINTMENT (OUTPATIENT)
Dept: GENERAL RADIOLOGY | Age: 55
End: 2025-03-10
Payer: COMMERCIAL

## 2025-03-10 ENCOUNTER — HOSPITAL ENCOUNTER (EMERGENCY)
Age: 55
Discharge: HOME | End: 2025-03-10
Attending: EMERGENCY MEDICINE
Payer: COMMERCIAL

## 2025-03-10 VITALS
SYSTOLIC BLOOD PRESSURE: 114 MMHG | OXYGEN SATURATION: 88 % | TEMPERATURE: 98.2 F | BODY MASS INDEX: 48.82 KG/M2 | DIASTOLIC BLOOD PRESSURE: 53 MMHG | HEART RATE: 81 BPM | RESPIRATION RATE: 16 BRPM | WEIGHT: 250 LBS

## 2025-03-10 DIAGNOSIS — M25.532 LEFT WRIST PAIN: Primary | ICD-10-CM

## 2025-03-10 LAB
ANION GAP SERPL CALCULATED.3IONS-SCNC: 13 MMOL/L (ref 9–16)
BASOPHILS # BLD: 0.12 K/UL (ref 0–0.2)
BASOPHILS NFR BLD: 1 % (ref 0–2)
BUN SERPL-MCNC: 13 MG/DL (ref 6–20)
BUN/CREAT SERPL: 16 (ref 9–20)
CALCIUM SERPL-MCNC: 9.4 MG/DL (ref 8.6–10.4)
CHLORIDE SERPL-SCNC: 100 MMOL/L (ref 98–107)
CO2 SERPL-SCNC: 24 MMOL/L (ref 20–31)
CREAT SERPL-MCNC: 0.8 MG/DL (ref 0.5–0.9)
CRP SERPL HS-MCNC: 17.9 MG/L (ref 0–5)
EOSINOPHIL # BLD: 0.23 K/UL (ref 0–0.44)
EOSINOPHILS RELATIVE PERCENT: 2 % (ref 1–4)
ERYTHROCYTE [DISTWIDTH] IN BLOOD BY AUTOMATED COUNT: 16.3 % (ref 11.8–14.4)
ERYTHROCYTE [SEDIMENTATION RATE] IN BLOOD BY PHOTOMETRIC METHOD: 39 MM/HR (ref 0–30)
GFR, ESTIMATED: >90 ML/MIN/1.73M2
GLUCOSE SERPL-MCNC: 95 MG/DL (ref 74–99)
HCT VFR BLD AUTO: 59 % (ref 36.3–47.1)
HGB BLD-MCNC: 19.9 G/DL (ref 11.9–15.1)
IMM GRANULOCYTES # BLD AUTO: 0.12 K/UL (ref 0–0.3)
IMM GRANULOCYTES NFR BLD: 1 %
LYMPHOCYTES NFR BLD: 2.11 K/UL (ref 1.1–3.7)
LYMPHOCYTES RELATIVE PERCENT: 18 % (ref 24–43)
MCH RBC QN AUTO: 30.6 PG (ref 25.2–33.5)
MCHC RBC AUTO-ENTMCNC: 33.7 G/DL (ref 28.4–34.8)
MCV RBC AUTO: 90.6 FL (ref 82.6–102.9)
MONOCYTES NFR BLD: 1.05 K/UL (ref 0.1–1.2)
MONOCYTES NFR BLD: 9 % (ref 3–12)
MORPHOLOGY: ABNORMAL
NEUTROPHILS NFR BLD: 69 % (ref 36–65)
NEUTS SEG NFR BLD: 8.07 K/UL (ref 1.5–8.1)
NRBC BLD-RTO: 0 PER 100 WBC
PLATELET # BLD AUTO: ABNORMAL K/UL (ref 138–453)
PLATELET, FLUORESCENCE: 226 K/UL (ref 138–453)
PLATELETS.RETICULATED NFR BLD AUTO: 4.6 % (ref 1.1–10.3)
POTASSIUM SERPL-SCNC: 4.5 MMOL/L (ref 3.7–5.3)
RBC # BLD AUTO: 6.51 M/UL (ref 3.95–5.11)
SODIUM SERPL-SCNC: 137 MMOL/L (ref 136–145)
URATE SERPL-MCNC: 4.3 MG/DL (ref 2.4–5.7)
WBC OTHER # BLD: 11.7 K/UL (ref 3.5–11.3)

## 2025-03-10 PROCEDURE — 99284 EMERGENCY DEPT VISIT MOD MDM: CPT

## 2025-03-10 PROCEDURE — 86140 C-REACTIVE PROTEIN: CPT

## 2025-03-10 PROCEDURE — 85652 RBC SED RATE AUTOMATED: CPT

## 2025-03-10 PROCEDURE — 36415 COLL VENOUS BLD VENIPUNCTURE: CPT

## 2025-03-10 PROCEDURE — 6360000002 HC RX W HCPCS: Performed by: EMERGENCY MEDICINE

## 2025-03-10 PROCEDURE — 73110 X-RAY EXAM OF WRIST: CPT

## 2025-03-10 PROCEDURE — 85025 COMPLETE CBC W/AUTO DIFF WBC: CPT

## 2025-03-10 PROCEDURE — 6370000000 HC RX 637 (ALT 250 FOR IP): Performed by: EMERGENCY MEDICINE

## 2025-03-10 PROCEDURE — 73130 X-RAY EXAM OF HAND: CPT

## 2025-03-10 PROCEDURE — 84550 ASSAY OF BLOOD/URIC ACID: CPT

## 2025-03-10 PROCEDURE — 96374 THER/PROPH/DIAG INJ IV PUSH: CPT

## 2025-03-10 PROCEDURE — 80048 BASIC METABOLIC PNL TOTAL CA: CPT

## 2025-03-10 RX ORDER — PREDNISONE 20 MG/1
60 TABLET ORAL ONCE
Status: COMPLETED | OUTPATIENT
Start: 2025-03-10 | End: 2025-03-10

## 2025-03-10 RX ORDER — PREDNISONE 50 MG/1
50 TABLET ORAL DAILY
Qty: 5 TABLET | Refills: 0 | Status: SHIPPED | OUTPATIENT
Start: 2025-03-10 | End: 2025-03-15

## 2025-03-10 RX ORDER — KETOROLAC TROMETHAMINE 15 MG/ML
15 INJECTION, SOLUTION INTRAMUSCULAR; INTRAVENOUS ONCE
Status: COMPLETED | OUTPATIENT
Start: 2025-03-10 | End: 2025-03-10

## 2025-03-10 RX ORDER — TRAMADOL HYDROCHLORIDE 50 MG/1
50 TABLET ORAL EVERY 6 HOURS PRN
Qty: 12 TABLET | Refills: 0 | Status: SHIPPED | OUTPATIENT
Start: 2025-03-10 | End: 2025-03-13 | Stop reason: SDUPTHER

## 2025-03-10 RX ADMIN — PREDNISONE 60 MG: 20 TABLET ORAL at 18:21

## 2025-03-10 RX ADMIN — KETOROLAC TROMETHAMINE 15 MG: 15 INJECTION, SOLUTION INTRAMUSCULAR; INTRAVENOUS at 18:21

## 2025-03-10 ASSESSMENT — PAIN SCALES - GENERAL
PAINLEVEL_OUTOF10: 10
PAINLEVEL_OUTOF10: 7

## 2025-03-10 ASSESSMENT — LIFESTYLE VARIABLES
HOW MANY STANDARD DRINKS CONTAINING ALCOHOL DO YOU HAVE ON A TYPICAL DAY: PATIENT DOES NOT DRINK
HOW OFTEN DO YOU HAVE A DRINK CONTAINING ALCOHOL: NEVER

## 2025-03-10 ASSESSMENT — PAIN DESCRIPTION - LOCATION: LOCATION: HAND;WRIST

## 2025-03-10 ASSESSMENT — PAIN DESCRIPTION - ORIENTATION: ORIENTATION: LEFT

## 2025-03-10 ASSESSMENT — PAIN DESCRIPTION - PAIN TYPE: TYPE: ACUTE PAIN

## 2025-03-10 ASSESSMENT — PAIN - FUNCTIONAL ASSESSMENT: PAIN_FUNCTIONAL_ASSESSMENT: 0-10

## 2025-03-10 NOTE — ED PROVIDER NOTES
EMERGENCY DEPARTMENT ENCOUNTER    Pt Name: Yesi Zuluaga  MRN: [id number]  Birthdate 1970  Date of evaluation: 3/10/25  CHIEF COMPLAINT       Chief Complaint   Patient presents with    Hand Pain     Left hand pain since Saturday. Tingling to fingertips and pain to wrist and hand. Denies any known injury. Ibuprofen taken 3 hrs pta with no relief.      HISTORY OF PRESENT ILLNESS   54-year-old female presents with complaints of pain and discomfort in the left hand and wrist.  The patient states that she started having left hand and wrist pain for the past few days.  She denies any specific fall or injury.  No fevers or chills, she denies any history of gout.           REVIEW OF SYSTEMS     Review of Systems  PASTMEDICAL HISTORY     Past Medical History:   Diagnosis Date    Anxiety     Chronic back pain     Congenital heart disease     COPD (chronic obstructive pulmonary disease) ([facility])     Depression     Family history of thyroid problem     Headache     Hypertension     Obesity     Osteoarthritis     Sleep apnea     Type 2 diabetes mellitus without complication ([facility])      Past Problem List  Patient Active Problem List   Diagnosis Code    Recurrent major depressive disorder F33.9    Mixed simple and mucopurulent chronic bronchitis ([facility]) J41.8    Morbid obesity with BMI of 45.0-49.9, adult E66.01, Z68.42    Personal history of tobacco use Z87.891    VANESSA (obstructive sleep apnea) G47.33    Pulmonary nodule R91.1    Polycythemia D75.1    COPD exacerbation ([facility]) J44.1    Type 2 diabetes mellitus without complication ([facility]) E11.9    Hypertension I10    Fluid overload E87.70     SURGICAL HISTORY       Past Surgical History:   Procedure Laterality Date    CARDIAC CATHETERIZATION Left 08/30/2024    R. radial / Dr. Pérez / [facility]    CARDIAC PROCEDURE N/A 8/30/2024    Left heart cath / coronary angiography performed by Dimas Pérez MD at [facility] CARDIAC CATH/IR LAB    CHOLECYSTECTOMY       CURRENT MEDICATIONS

## 2025-03-11 ENCOUNTER — TELEPHONE (OUTPATIENT)
Dept: PRIMARY CARE CLINIC | Age: 55
End: 2025-03-11

## 2025-03-11 NOTE — TELEPHONE ENCOUNTER
[facility] Transitions Initial Follow Up Call    Outreach made within 2 business days of discharge: Yes    Patient: Yesi Zuluaga Patient : 1970   MRN: [id number]  Reason for Admission: There are no discharge diagnoses documented for the most recent discharge.  Discharge Date: 3/10/25       Spoke with: Yesi    Discharge department/facility: [facility]     TCM Interactive Patient Contact:  Was patient able to fill all prescriptions: Yes  Was patient instructed to bring all medications to the follow-up visit: Yes  Is patient taking all medications as directed in the discharge summary? Yes  Does patient understand their discharge instructions: Yes  Does patient have questions or concerns that need addressed prior to 7-14 day follow up office visit: no    Scheduled appointment with PCP within 7-14 days    Follow Up  Future Appointments   Date Time Provider Department Center   3/13/2025  4:20 PM Kathie Jacobson APRN - CNP [facility] DEP   2025  4:20 PM Kathie Jacobson APRN - CNP [facility] DEP       Rosmery Morales MA

## 2025-03-13 ENCOUNTER — OFFICE VISIT (OUTPATIENT)
Dept: PRIMARY CARE CLINIC | Age: 55
End: 2025-03-13
Payer: COMMERCIAL

## 2025-03-13 VITALS
WEIGHT: 268.2 LBS | BODY MASS INDEX: 52.38 KG/M2 | DIASTOLIC BLOOD PRESSURE: 74 MMHG | TEMPERATURE: 98.1 F | SYSTOLIC BLOOD PRESSURE: 122 MMHG | RESPIRATION RATE: 16 BRPM | HEART RATE: 74 BPM | OXYGEN SATURATION: 95 %

## 2025-03-13 DIAGNOSIS — M79.642 LEFT HAND PAIN: ICD-10-CM

## 2025-03-13 DIAGNOSIS — Z87.39 HISTORY OF DEGENERATIVE DISC DISEASE: ICD-10-CM

## 2025-03-13 DIAGNOSIS — M25.532 LEFT WRIST PAIN: Primary | ICD-10-CM

## 2025-03-13 DIAGNOSIS — F31.9 BIPOLAR DEPRESSION (HCC): ICD-10-CM

## 2025-03-13 PROCEDURE — 3078F DIAST BP <80 MM HG: CPT | Performed by: NURSE PRACTITIONER

## 2025-03-13 PROCEDURE — 99214 OFFICE O/P EST MOD 30 MIN: CPT | Performed by: NURSE PRACTITIONER

## 2025-03-13 PROCEDURE — 3074F SYST BP LT 130 MM HG: CPT | Performed by: NURSE PRACTITIONER

## 2025-03-13 RX ORDER — BACLOFEN 10 MG/1
10 TABLET ORAL 3 TIMES DAILY
Qty: 270 TABLET | Refills: 3 | Status: SHIPPED | OUTPATIENT
Start: 2025-03-13

## 2025-03-13 RX ORDER — ARIPIPRAZOLE 30 MG/1
30 TABLET ORAL DAILY
Qty: 90 TABLET | Refills: 1 | Status: SHIPPED | OUTPATIENT
Start: 2025-03-13

## 2025-03-13 RX ORDER — GABAPENTIN 300 MG/1
300 CAPSULE ORAL 3 TIMES DAILY
Qty: 180 CAPSULE | Refills: 2 | Status: SHIPPED | OUTPATIENT
Start: 2025-03-13 | End: 2025-09-09

## 2025-03-13 RX ORDER — TRAMADOL HYDROCHLORIDE 50 MG/1
50 TABLET ORAL EVERY 6 HOURS PRN
Qty: 30 TABLET | Refills: 0 | Status: SHIPPED | OUTPATIENT
Start: 2025-03-13 | End: 2025-03-21

## 2025-03-13 ASSESSMENT — PATIENT HEALTH QUESTIONNAIRE - PHQ9
2. FEELING DOWN, DEPRESSED OR HOPELESS: NOT AT ALL
SUM OF ALL RESPONSES TO PHQ QUESTIONS 1-9: 0
3. TROUBLE FALLING OR STAYING ASLEEP: NOT AT ALL
5. POOR APPETITE OR OVEREATING: NOT AT ALL
SUM OF ALL RESPONSES TO PHQ QUESTIONS 1-9: 0
1. LITTLE INTEREST OR PLEASURE IN DOING THINGS: NOT AT ALL
9. THOUGHTS THAT YOU WOULD BE BETTER OFF DEAD, OR OF HURTING YOURSELF: NOT AT ALL
4. FEELING TIRED OR HAVING LITTLE ENERGY: NOT AT ALL
SUM OF ALL RESPONSES TO PHQ QUESTIONS 1-9: 0
10. IF YOU CHECKED OFF ANY PROBLEMS, HOW DIFFICULT HAVE THESE PROBLEMS MADE IT FOR YOU TO DO YOUR WORK, TAKE CARE OF THINGS AT HOME, OR GET ALONG WITH OTHER PEOPLE: NOT DIFFICULT AT ALL
7. TROUBLE CONCENTRATING ON THINGS, SUCH AS READING THE NEWSPAPER OR WATCHING TELEVISION: NOT AT ALL
8. MOVING OR SPEAKING SO SLOWLY THAT OTHER PEOPLE COULD HAVE NOTICED. OR THE OPPOSITE, BEING SO FIGETY OR RESTLESS THAT YOU HAVE BEEN MOVING AROUND A LOT MORE THAN USUAL: NOT AT ALL
SUM OF ALL RESPONSES TO PHQ QUESTIONS 1-9: 0
6. FEELING BAD ABOUT YOURSELF - OR THAT YOU ARE A FAILURE OR HAVE LET YOURSELF OR YOUR FAMILY DOWN: NOT AT ALL

## 2025-03-13 ASSESSMENT — ENCOUNTER SYMPTOMS
EYES NEGATIVE: 1
ALLERGIC/IMMUNOLOGIC NEGATIVE: 1
GASTROINTESTINAL NEGATIVE: 1
RESPIRATORY NEGATIVE: 1

## 2025-03-13 NOTE — PATIENT INSTRUCTIONS
SURVEY:     You may be receiving a survey from [facility] [employer] regarding your visit today.     Please complete the survey to enable us to provide the highest quality of care to you and your family.     If you cannot score us a very good on any question, please call the office to discuss how we could have made your experience a very good one.     Thank you,    Jaison Rothman, APRN-CNP  Kathie Jacobson, APRN-CNP  Elaine, LPN  Sarah, CMA  Rudolph, CMA  Rosmery, CMA  Cookie, PCA  Casie, CMA  Nithya, PM

## 2025-03-13 NOTE — PROGRESS NOTES
[facility]  SAUD SHEN CNP  [facility] [place] PRIMARY CARE  [address] [place] [id number]  Dept: [phone]  Dept Fax: [phone]      Name: Yesi Zuluaga  : 1970         Chief Complaint:     Chief Complaint   Patient presents with    Follow-up     Patient was in the ED on 3/10/25 for left wrist pain. Patient is still having pain and can barely sleep at night. Patient had Xrays done in the ED.        History of Present Illness:      Yesi Zuluaga is a 54 y.o.  female who presents with Follow-up (Patient was in the ED on 3/10/25 for left wrist pain. Patient is still having pain and can barely sleep at night. Patient had Xrays done in the ED. )      CARRIE Key is here today for an ED follow up.  She was seen in the ER on 3/10/25 for left wrist and hand pain.  She had negative xrays but her inflammatory markers were elevated.  She has been having left wrist and hand pain for awhile and wearing braces at night.  This was the worst the pain has ever been.  Her pain has improved with the prednisone and tramadol.  She still has pain in the left wrist with getting dressed.  She sometimes has pain at rest but no pain today.  She gets numbness in her fingers on and off.    Past Medical History:     Past Medical History:   Diagnosis Date    Anxiety     Chronic back pain     Congenital heart disease     COPD (chronic obstructive pulmonary disease) (HCC)     Depression     Family history of thyroid problem     Headache     Hypertension     Obesity     Osteoarthritis     Sleep apnea     Type 2 diabetes mellitus without complication (HCC)       Reviewed all health maintenance requirements and ordered appropriate tests  Health Maintenance Due   Topic Date Due    HIV screen  Never done    Hepatitis C screen  Never done    DTaP/Tdap/Td vaccine (1 - Tdap) Never done    Pneumococcal 50+ years Vaccine (1 of 2 - PCV) Never done    Colorectal Cancer Screen  02/15/2023    Flu vaccine (1) 2024

## 2025-03-17 DIAGNOSIS — M51.369 DEGENERATIVE DISC DISEASE, LUMBAR: ICD-10-CM

## 2025-03-17 RX ORDER — MELOXICAM 15 MG/1
15 TABLET ORAL DAILY
Qty: 90 TABLET | Refills: 1 | Status: SHIPPED | OUTPATIENT
Start: 2025-03-17

## 2025-03-17 NOTE — TELEPHONE ENCOUNTER
contrast/bubble/strain/3D) Once 07/12/2024   Basic Metabolic Panel Once 07/12/2024   EMG Once 03/13/2025       Next Visit Date:  Future Appointments   Date Time Provider Department Center   8/5/2025  4:20 PM Kathie Jacobson, APRN - CNP [facility] DEP            Patient Active Problem List:     Recurrent major depressive disorder     Mixed simple and mucopurulent chronic bronchitis (HCC)     Morbid obesity with BMI of 45.0-49.9, adult     Personal history of tobacco use     VANESSA (obstructive sleep apnea)     Pulmonary nodule     Polycythemia     COPD exacerbation (HCC)     Type 2 diabetes mellitus without complication (HCC)     Hypertension     Fluid overload

## 2025-03-21 ENCOUNTER — TELEPHONE (OUTPATIENT)
Dept: PRIMARY CARE CLINIC | Age: 55
End: 2025-03-21

## 2025-03-21 NOTE — TELEPHONE ENCOUNTER
Received call from Rozina MOREL in regards to patient's EMG referral. They do not take patient's insurance. Patient notified of this and verbalized that she is okay to go to [place] for this.     Referral faxed to Александр.

## 2025-04-03 DIAGNOSIS — R60.0 PERIPHERAL EDEMA: ICD-10-CM

## 2025-04-03 RX ORDER — SPIRONOLACTONE 25 MG/1
25 TABLET ORAL DAILY
Qty: 90 TABLET | Refills: 3 | Status: SHIPPED | OUTPATIENT
Start: 2025-04-03

## 2025-04-03 NOTE — TELEPHONE ENCOUNTER
Health Maintenance   Topic Date Due    HIV screen  Never done    Hepatitis C screen  Never done    Hepatitis B vaccine (1 of 3 - 19+ 3-dose series) Never done    DTaP/Tdap/Td vaccine (1 - Tdap) Never done    Pneumococcal 50+ years Vaccine (1 of 2 - PCV) Never done    Shingles vaccine (1 of 2) Never done    Colorectal Cancer Screen  02/15/2023    COVID-19 Vaccine (5 - 2024-25 season) 09/01/2024    Diabetic foot exam  08/28/2025 (Originally 11/17/1982)    Cervical cancer screen  08/28/2025 (Originally 11/17/2002)    Lung Cancer Screening &/or Counseling  06/06/2025    Flu vaccine (Season Ended) 08/01/2025    Diabetic retinal exam  01/03/2026    A1C test (Diabetic or Prediabetic)  02/04/2026    Diabetic Alb to Cr ratio (uACR) test  02/21/2026    Lipids  02/21/2026    GFR test (Diabetes, CKD 3-4, OR last GFR 15-59)  03/10/2026    Depression Monitoring  03/13/2026    Breast cancer screen  05/08/2026    Hepatitis A vaccine  Aged Out    Hib vaccine  Aged Out    Polio vaccine  Aged Out    Meningococcal (ACWY) vaccine  Aged Out    Meningococcal B vaccine  Aged Out    Diabetes screen  Discontinued             (applicable per patient's age: Cancer Screenings, Depression Screening, Fall Risk Screening, Immunizations)    Hemoglobin A1C (%)   Date Value   02/04/2025 5.9   07/02/2024 5.8   12/01/2023 6.0     AST (U/L)   Date Value   02/21/2025 30     ALT (U/L)   Date Value   02/21/2025 47 (H)     BUN (mg/dL)   Date Value   03/10/2025 13      (goal A1C is < 7)   (goal LDL is <100) need 30-50% reduction from baseline     BP Readings from Last 3 Encounters:   03/13/25 122/74   03/10/25 (!) 114/53   02/04/25 128/82    (goal /80)      All Future Testing planned in [facility]:  Lab Frequency Next Occurrence   Comprehensive Metabolic Panel Once 01/05/2025   6 Minute Walk Test Once 07/12/2024   Echo (TTE) complete (PRN contrast/bubble/strain/3D) Once 07/12/2024   Basic Metabolic Panel Once 07/12/2024   EMG Once 03/13/2025       Next

## 2025-04-10 ENCOUNTER — OFFICE VISIT (OUTPATIENT)
Dept: PRIMARY CARE CLINIC | Age: 55
End: 2025-04-10
Payer: COMMERCIAL

## 2025-04-10 VITALS
HEART RATE: 88 BPM | DIASTOLIC BLOOD PRESSURE: 62 MMHG | WEIGHT: 260.2 LBS | RESPIRATION RATE: 16 BRPM | OXYGEN SATURATION: 95 % | BODY MASS INDEX: 50.82 KG/M2 | SYSTOLIC BLOOD PRESSURE: 124 MMHG | TEMPERATURE: 98.1 F

## 2025-04-10 DIAGNOSIS — J98.4 RESTRICTIVE AIRWAY DISEASE: ICD-10-CM

## 2025-04-10 DIAGNOSIS — J44.9 CHRONIC OBSTRUCTIVE PULMONARY DISEASE, UNSPECIFIED COPD TYPE (HCC): ICD-10-CM

## 2025-04-10 DIAGNOSIS — H66.003 NON-RECURRENT ACUTE SUPPURATIVE OTITIS MEDIA OF BOTH EARS WITHOUT SPONTANEOUS RUPTURE OF TYMPANIC MEMBRANES: Primary | ICD-10-CM

## 2025-04-10 PROCEDURE — 3074F SYST BP LT 130 MM HG: CPT | Performed by: NURSE PRACTITIONER

## 2025-04-10 PROCEDURE — 3078F DIAST BP <80 MM HG: CPT | Performed by: NURSE PRACTITIONER

## 2025-04-10 PROCEDURE — 99214 OFFICE O/P EST MOD 30 MIN: CPT | Performed by: NURSE PRACTITIONER

## 2025-04-10 RX ORDER — FLUTICASONE PROPIONATE 50 MCG
1 SPRAY, SUSPENSION (ML) NASAL 2 TIMES DAILY
Qty: 9.9 ML | Refills: 0 | Status: SHIPPED | OUTPATIENT
Start: 2025-04-10 | End: 2025-04-17

## 2025-04-10 RX ORDER — ALBUTEROL SULFATE 90 UG/1
INHALANT RESPIRATORY (INHALATION)
Qty: 54 G | Refills: 3 | Status: SHIPPED | OUTPATIENT
Start: 2025-04-10

## 2025-04-10 ASSESSMENT — PATIENT HEALTH QUESTIONNAIRE - PHQ9
2. FEELING DOWN, DEPRESSED OR HOPELESS: NOT AT ALL
6. FEELING BAD ABOUT YOURSELF - OR THAT YOU ARE A FAILURE OR HAVE LET YOURSELF OR YOUR FAMILY DOWN: NOT AT ALL
SUM OF ALL RESPONSES TO PHQ QUESTIONS 1-9: 0
9. THOUGHTS THAT YOU WOULD BE BETTER OFF DEAD, OR OF HURTING YOURSELF: NOT AT ALL
10. IF YOU CHECKED OFF ANY PROBLEMS, HOW DIFFICULT HAVE THESE PROBLEMS MADE IT FOR YOU TO DO YOUR WORK, TAKE CARE OF THINGS AT HOME, OR GET ALONG WITH OTHER PEOPLE: NOT DIFFICULT AT ALL
5. POOR APPETITE OR OVEREATING: NOT AT ALL
7. TROUBLE CONCENTRATING ON THINGS, SUCH AS READING THE NEWSPAPER OR WATCHING TELEVISION: NOT AT ALL
4. FEELING TIRED OR HAVING LITTLE ENERGY: NOT AT ALL
1. LITTLE INTEREST OR PLEASURE IN DOING THINGS: NOT AT ALL
SUM OF ALL RESPONSES TO PHQ QUESTIONS 1-9: 0
3. TROUBLE FALLING OR STAYING ASLEEP: NOT AT ALL
8. MOVING OR SPEAKING SO SLOWLY THAT OTHER PEOPLE COULD HAVE NOTICED. OR THE OPPOSITE, BEING SO FIGETY OR RESTLESS THAT YOU HAVE BEEN MOVING AROUND A LOT MORE THAN USUAL: NOT AT ALL

## 2025-04-10 ASSESSMENT — ENCOUNTER SYMPTOMS
ALLERGIC/IMMUNOLOGIC NEGATIVE: 1
GASTROINTESTINAL NEGATIVE: 1
EYES NEGATIVE: 1
RHINORRHEA: 1
WHEEZING: 1
COUGH: 1

## 2025-04-10 NOTE — PATIENT INSTRUCTIONS
SURVEY:     You may be receiving a survey from [facility] [employer] regarding your visit today.     Please complete the survey to enable us to provide the highest quality of care to you and your family.     If you cannot score us a very good on any question, please call the office to discuss how we could have made your experience a very good one.     Thank you,    Jaison Rothman, APRN-CNP  Kathie Jacobson, APRN-CNP  Elaine, LPN  Sarah, CMA  Rudolph, CMA  Rosmery, CMA  Cookie, PCA  Casie, CMA  Nithya, PM

## 2025-04-10 NOTE — PROGRESS NOTES
[facility]  SAUD SHEN CNP  [facility] PRIMARY CARE  [address] [place] [id number]  Dept: [phone]  Dept Fax: [phone]      Name: Yesi Zuluaga  : 1970         Chief Complaint:     Chief Complaint   Patient presents with    Ear Pain     Patient went to Urgent Care 2 weeks ago and was on an antibiotic but is still having bilateral ear pain.        History of Present Illness:      Yesi Zuluaga is a 54 y.o.  female who presents with Ear Pain (Patient went to Urgent Care 2 weeks ago and was on an antibiotic but is still having bilateral ear pain. )      HPI    Yesi is here today for complaints of bilateral ear pain.  She went to the  2 weeks ago and was prescribed Omnicef for bilateral ear infection.  She no longer has shooting pains in her ears.  She has dizziness, muffled hearing, and a \"whooshing\" sound in her ears.  She has had some yellow drainage from her ears.  She denies a fever.      She has had some increased chest congestion this past week.  She has only been using her Dulera inhaler and needs a refill of her Albuterol.      Past Medical History:     Past Medical History:   Diagnosis Date    Anxiety     Chronic back pain     Congenital heart disease     COPD (chronic obstructive pulmonary disease) (HCC)     Depression     Family history of thyroid problem     Headache     Hypertension     Obesity     Osteoarthritis     Sleep apnea     Type 2 diabetes mellitus without complication       Reviewed all health maintenance requirements and ordered appropriate tests  Health Maintenance Due   Topic Date Due    DTaP/Tdap/Td vaccine (1 - Tdap) Never done    Pneumococcal 50+ years Vaccine (1 of 2 - PCV) Never done    Shingles vaccine (1 of 2) Never done    Colorectal Cancer Screen  02/15/2023    COVID-19 Vaccine (2024- season) 2024       Past Surgical History:     Past Surgical History:   Procedure Laterality Date    CARDIAC CATHETERIZATION Left 2024

## 2025-04-12 DIAGNOSIS — R42 DIZZINESS: ICD-10-CM

## 2025-04-12 DIAGNOSIS — I10 PRIMARY HYPERTENSION: ICD-10-CM

## 2025-04-14 RX ORDER — CARVEDILOL 6.25 MG/1
6.25 TABLET ORAL 2 TIMES DAILY
Qty: 180 TABLET | Refills: 1 | Status: SHIPPED | OUTPATIENT
Start: 2025-04-14

## 2025-04-14 NOTE — TELEPHONE ENCOUNTER
Health Maintenance   Topic Date Due    DTaP/Tdap/Td vaccine (1 - Tdap) Never done    Pneumococcal 50+ years Vaccine (1 of 2 - PCV) Never done    Shingles vaccine (1 of 2) Never done    Colorectal Cancer Screen  02/15/2023    COVID-19 Vaccine (5 - 2024-25 season) 09/01/2024    Diabetic foot exam  08/28/2025 (Originally 11/17/1982)    Cervical cancer screen  08/28/2025 (Originally 11/17/2002)    Hepatitis B vaccine (1 of 3 - 19+ 3-dose series) 04/10/2026 (Originally 11/17/1991)    Hepatitis C screen  04/10/2026 (Originally 11/17/1990)    HIV screen  04/10/2026 (Originally 11/17/1987)    Lung Cancer Screening &/or Counseling  06/06/2025    Flu vaccine (Season Ended) 08/01/2025    Diabetic retinal exam  01/03/2026    A1C test (Diabetic or Prediabetic)  02/04/2026    Diabetic Alb to Cr ratio (uACR) test  02/21/2026    Lipids  02/21/2026    GFR test (Diabetes, CKD 3-4, OR last GFR 15-59)  03/10/2026    Depression Monitoring  04/10/2026    Breast cancer screen  05/08/2026    Hepatitis A vaccine  Aged Out    Hib vaccine  Aged Out    Polio vaccine  Aged Out    Meningococcal (ACWY) vaccine  Aged Out    Meningococcal B vaccine  Aged Out    Diabetes screen  Discontinued             (applicable per patient's age: Cancer Screenings, Depression Screening, Fall Risk Screening, Immunizations)    Hemoglobin A1C (%)   Date Value   02/04/2025 5.9   07/02/2024 5.8   12/01/2023 6.0     AST (U/L)   Date Value   02/21/2025 30     ALT (U/L)   Date Value   02/21/2025 47 (H)     BUN (mg/dL)   Date Value   03/10/2025 13      (goal A1C is < 7)   (goal LDL is <100) need 30-50% reduction from baseline     BP Readings from Last 3 Encounters:   04/10/25 124/62   03/13/25 122/74   03/10/25 (!) 114/53    (goal /80)      All Future Testing planned in [facility]:  Lab Frequency Next Occurrence   Comprehensive Metabolic Panel Once 01/05/2025   6 Minute Walk Test Once 07/12/2024   Echo (TTE) complete (PRN contrast/bubble/strain/3D) Once 07/12/2024

## 2025-05-14 DIAGNOSIS — F41.9 ANXIETY: ICD-10-CM

## 2025-05-14 RX ORDER — HYDROXYZINE HYDROCHLORIDE 50 MG/1
50 TABLET, FILM COATED ORAL NIGHTLY
Qty: 90 TABLET | Refills: 0 | Status: SHIPPED | OUTPATIENT
Start: 2025-05-14

## 2025-05-14 NOTE — TELEPHONE ENCOUNTER
Health Maintenance   Topic Date Due    DTaP/Tdap/Td vaccine (1 - Tdap) Never done    Pneumococcal 50+ years Vaccine (1 of 2 - PCV) Never done    Shingles vaccine (1 of 2) Never done    Colorectal Cancer Screen  02/15/2023    COVID-19 Vaccine (5 - 2024-25 season) 09/01/2024    Lung Cancer Screening &/or Counseling  06/06/2025    Diabetic foot exam  08/28/2025 (Originally 11/17/1982)    Cervical cancer screen  08/28/2025 (Originally 11/17/2002)    Hepatitis B vaccine (1 of 3 - 19+ 3-dose series) 04/10/2026 (Originally 11/17/1991)    Hepatitis C screen  04/10/2026 (Originally 11/17/1990)    HIV screen  04/10/2026 (Originally 11/17/1987)    Flu vaccine (Season Ended) 08/01/2025    Diabetic retinal exam  01/03/2026    A1C test (Diabetic or Prediabetic)  02/04/2026    Diabetic Alb to Cr ratio (uACR) test  02/21/2026    Lipids  02/21/2026    GFR test (Diabetes, CKD 3-4, OR last GFR 15-59)  03/10/2026    Depression Monitoring  04/10/2026    Breast cancer screen  05/08/2026    Hepatitis A vaccine  Aged Out    Hib vaccine  Aged Out    Polio vaccine  Aged Out    Meningococcal (ACWY) vaccine  Aged Out    Meningococcal B vaccine  Aged Out    Diabetes screen  Discontinued             (applicable per patient's age: Cancer Screenings, Depression Screening, Fall Risk Screening, Immunizations)    Hemoglobin A1C (%)   Date Value   02/04/2025 5.9   07/02/2024 5.8   12/01/2023 6.0     AST (U/L)   Date Value   02/21/2025 30     ALT (U/L)   Date Value   02/21/2025 47 (H)     BUN (mg/dL)   Date Value   03/10/2025 13      (goal A1C is < 7)   (goal LDL is <100) need 30-50% reduction from baseline     BP Readings from Last 3 Encounters:   04/10/25 124/62   03/13/25 122/74   03/10/25 (!) 114/53    (goal /80)      All Future Testing planned in [facility]:  Lab Frequency Next Occurrence   Comprehensive Metabolic Panel Once 01/05/2025   6 Minute Walk Test Once 07/12/2024   Echo (TTE) complete (PRN contrast/bubble/strain/3D) Once 07/12/2024

## 2025-07-06 DIAGNOSIS — J44.9 CHRONIC OBSTRUCTIVE PULMONARY DISEASE, UNSPECIFIED COPD TYPE (HCC): ICD-10-CM

## 2025-07-07 NOTE — TELEPHONE ENCOUNTER
Health Maintenance   Topic Date Due    DTaP/Tdap/Td vaccine (1 - Tdap) Never done    Pneumococcal 50+ years Vaccine (1 of 2 - PCV) Never done    Shingles vaccine (1 of 2) Never done    Colorectal Cancer Screen  02/15/2023    COVID-19 Vaccine (5 - 2024-25 season) 09/01/2024    Lung Cancer Screening &/or Counseling  06/06/2025    Diabetic foot exam  08/28/2025 (Originally 11/17/1982)    Cervical cancer screen  08/28/2025 (Originally 11/17/2002)    Hepatitis B vaccine (1 of 3 - 19+ 3-dose series) 04/10/2026 (Originally 11/17/1991)    Hepatitis C screen  04/10/2026 (Originally 11/17/1990)    HIV screen  04/10/2026 (Originally 11/17/1987)    Flu vaccine (1) 08/01/2025    Diabetic retinal exam  01/03/2026    A1C test (Diabetic or Prediabetic)  02/04/2026    Diabetic Alb to Cr ratio (uACR) test  02/21/2026    Lipids  02/21/2026    GFR test (Diabetes, CKD 3-4, OR last GFR 15-59)  03/10/2026    Depression Monitoring  04/10/2026    Breast cancer screen  05/08/2026    Hepatitis A vaccine  Aged Out    Hib vaccine  Aged Out    Polio vaccine  Aged Out    Meningococcal (ACWY) vaccine  Aged Out    Meningococcal B vaccine  Aged Out    Diabetes screen  Discontinued             (applicable per patient's age: Cancer Screenings, Depression Screening, Fall Risk Screening, Immunizations)    Hemoglobin A1C (%)   Date Value   02/04/2025 5.9   07/02/2024 5.8   12/01/2023 6.0     AST (U/L)   Date Value   02/21/2025 30     ALT (U/L)   Date Value   02/21/2025 47 (H)     BUN (mg/dL)   Date Value   03/10/2025 13      (goal A1C is < 7)   (goal LDL is <100) need 30-50% reduction from baseline     BP Readings from Last 3 Encounters:   04/10/25 124/62   03/13/25 122/74   03/10/25 (!) 114/53    (goal /80)      All Future Testing planned in [facility]:  Lab Frequency Next Occurrence   Comprehensive Metabolic Panel Once 01/05/2025   6 Minute Walk Test Once 07/12/2024   Echo (TTE) complete (PRN contrast/bubble/strain/3D) Once 07/12/2024   Basic

## 2025-07-13 DIAGNOSIS — R73.03 PRE-DIABETES: ICD-10-CM

## 2025-07-13 DIAGNOSIS — R06.02 SOB (SHORTNESS OF BREATH): ICD-10-CM

## 2025-07-13 DIAGNOSIS — I10 PRIMARY HYPERTENSION: ICD-10-CM

## 2025-07-13 DIAGNOSIS — R53.83 OTHER FATIGUE: ICD-10-CM

## 2025-07-13 DIAGNOSIS — J43.9 PULMONARY EMPHYSEMA, UNSPECIFIED EMPHYSEMA TYPE (HCC): ICD-10-CM

## 2025-07-13 DIAGNOSIS — G47.33 OSA (OBSTRUCTIVE SLEEP APNEA): ICD-10-CM

## 2025-07-13 DIAGNOSIS — R07.89 ATYPICAL CHEST PAIN: ICD-10-CM

## 2025-07-13 DIAGNOSIS — R00.2 PALPITATION: ICD-10-CM

## 2025-07-13 DIAGNOSIS — Z71.6 TOBACCO ABUSE COUNSELING: ICD-10-CM

## 2025-07-13 DIAGNOSIS — R42 DIZZINESS: ICD-10-CM

## 2025-07-14 DIAGNOSIS — R42 DIZZINESS: ICD-10-CM

## 2025-07-14 DIAGNOSIS — R07.89 ATYPICAL CHEST PAIN: ICD-10-CM

## 2025-07-14 DIAGNOSIS — R53.83 OTHER FATIGUE: ICD-10-CM

## 2025-07-14 DIAGNOSIS — R00.2 PALPITATION: ICD-10-CM

## 2025-07-14 DIAGNOSIS — R06.02 SOB (SHORTNESS OF BREATH): ICD-10-CM

## 2025-07-14 DIAGNOSIS — Z71.6 TOBACCO ABUSE COUNSELING: ICD-10-CM

## 2025-07-14 DIAGNOSIS — G47.33 OSA (OBSTRUCTIVE SLEEP APNEA): ICD-10-CM

## 2025-07-14 DIAGNOSIS — I10 PRIMARY HYPERTENSION: ICD-10-CM

## 2025-07-14 DIAGNOSIS — J43.9 PULMONARY EMPHYSEMA, UNSPECIFIED EMPHYSEMA TYPE (HCC): ICD-10-CM

## 2025-07-14 DIAGNOSIS — R73.03 PRE-DIABETES: ICD-10-CM

## 2025-07-14 RX ORDER — DILTIAZEM HYDROCHLORIDE 180 MG/1
180 CAPSULE, COATED, EXTENDED RELEASE ORAL DAILY
Qty: 90 CAPSULE | Refills: 3 | OUTPATIENT
Start: 2025-07-14

## 2025-07-14 RX ORDER — ASPIRIN 81 MG/1
81 TABLET, COATED ORAL DAILY
Qty: 90 TABLET | Refills: 3 | Status: SHIPPED | OUTPATIENT
Start: 2025-07-14

## 2025-07-15 DIAGNOSIS — Z87.39 HISTORY OF DEGENERATIVE DISC DISEASE: ICD-10-CM

## 2025-07-22 RX ORDER — ALBUTEROL SULFATE 90 UG/1
2 INHALANT RESPIRATORY (INHALATION) EVERY 6 HOURS PRN
Qty: 8.5 G | Refills: 3 | Status: SHIPPED | OUTPATIENT
Start: 2025-07-22

## 2025-07-29 DIAGNOSIS — H66.003 NON-RECURRENT ACUTE SUPPURATIVE OTITIS MEDIA OF BOTH EARS WITHOUT SPONTANEOUS RUPTURE OF TYMPANIC MEMBRANES: ICD-10-CM

## 2025-07-29 RX ORDER — FLUTICASONE PROPIONATE 50 MCG
1 SPRAY, SUSPENSION (ML) NASAL 2 TIMES DAILY
Qty: 9.9 ML | Refills: 0 | Status: SHIPPED | OUTPATIENT
Start: 2025-07-29 | End: 2025-08-05

## 2025-07-29 NOTE — TELEPHONE ENCOUNTER
Health Maintenance   Topic Date Due    DTaP/Tdap/Td vaccine (1 - Tdap) Never done    Pneumococcal 50+ years Vaccine (1 of 2 - PCV) Never done    Shingles vaccine (1 of 2) Never done    Colorectal Cancer Screen  02/15/2023    COVID-19 Vaccine (5 - 2024-25 season) 09/01/2024    Lung Cancer Screening &/or Counseling  06/06/2025    Diabetic foot exam  08/28/2025 (Originally 11/17/1982)    Cervical cancer screen  08/28/2025 (Originally 11/17/2002)    Hepatitis B vaccine (1 of 3 - 19+ 3-dose series) 04/10/2026 (Originally 11/17/1991)    Hepatitis C screen  04/10/2026 (Originally 11/17/1990)    HIV screen  04/10/2026 (Originally 11/17/1987)    Flu vaccine (1) 08/01/2025    Diabetic retinal exam  01/03/2026    A1C test (Diabetic or Prediabetic)  02/04/2026    Diabetic Alb to Cr ratio (uACR) test  02/21/2026    Lipids  02/21/2026    GFR test (Diabetes, CKD 3-4, OR last GFR 15-59)  03/10/2026    Depression Monitoring  04/10/2026    Breast cancer screen  05/08/2026    Hepatitis A vaccine  Aged Out    Hib vaccine  Aged Out    Polio vaccine  Aged Out    Meningococcal (ACWY) vaccine  Aged Out    Meningococcal B vaccine  Aged Out    Diabetes screen  Discontinued             (applicable per patient's age: Cancer Screenings, Depression Screening, Fall Risk Screening, Immunizations)    Hemoglobin A1C (%)   Date Value   02/04/2025 5.9   07/02/2024 5.8   12/01/2023 6.0     AST (U/L)   Date Value   02/21/2025 30     ALT (U/L)   Date Value   02/21/2025 47 (H)     BUN (mg/dL)   Date Value   03/10/2025 13      (goal A1C is < 7)   (goal LDL is <100) need 30-50% reduction from baseline     BP Readings from Last 3 Encounters:   04/10/25 124/62   03/13/25 122/74   03/10/25 (!) 114/53    (goal /80)      All Future Testing planned in [facility]:  Lab Frequency Next Occurrence       Next Visit Date:  Future Appointments   Date Time Provider Department Center   8/5/2025  4:20 PM Kathie Jacobson, APRN - CNP [facility] DEP

## 2025-08-05 ENCOUNTER — OFFICE VISIT (OUTPATIENT)
Dept: PRIMARY CARE CLINIC | Age: 55
End: 2025-08-05
Payer: COMMERCIAL

## 2025-08-05 VITALS
WEIGHT: 254.6 LBS | HEART RATE: 74 BPM | RESPIRATION RATE: 16 BRPM | DIASTOLIC BLOOD PRESSURE: 82 MMHG | BODY MASS INDEX: 49.72 KG/M2 | OXYGEN SATURATION: 93 % | TEMPERATURE: 97.3 F | SYSTOLIC BLOOD PRESSURE: 124 MMHG

## 2025-08-05 DIAGNOSIS — R53.83 OTHER FATIGUE: ICD-10-CM

## 2025-08-05 DIAGNOSIS — E11.9 TYPE 2 DIABETES MELLITUS WITHOUT COMPLICATION, UNSPECIFIED WHETHER LONG TERM INSULIN USE (HCC): Primary | ICD-10-CM

## 2025-08-05 DIAGNOSIS — G47.33 OSA (OBSTRUCTIVE SLEEP APNEA): ICD-10-CM

## 2025-08-05 DIAGNOSIS — F31.9 BIPOLAR DEPRESSION (HCC): ICD-10-CM

## 2025-08-05 DIAGNOSIS — I10 PRIMARY HYPERTENSION: ICD-10-CM

## 2025-08-05 DIAGNOSIS — Z12.11 COLON CANCER SCREENING: ICD-10-CM

## 2025-08-05 DIAGNOSIS — D75.1 POLYCYTHEMIA: ICD-10-CM

## 2025-08-05 DIAGNOSIS — H93.13 TINNITUS OF BOTH EARS: ICD-10-CM

## 2025-08-05 PROCEDURE — 3017F COLORECTAL CA SCREEN DOC REV: CPT | Performed by: NURSE PRACTITIONER

## 2025-08-05 PROCEDURE — 3079F DIAST BP 80-89 MM HG: CPT | Performed by: NURSE PRACTITIONER

## 2025-08-05 PROCEDURE — 3044F HG A1C LEVEL LT 7.0%: CPT | Performed by: NURSE PRACTITIONER

## 2025-08-05 PROCEDURE — G8417 CALC BMI ABV UP PARAM F/U: HCPCS | Performed by: NURSE PRACTITIONER

## 2025-08-05 PROCEDURE — G8427 DOCREV CUR MEDS BY ELIG CLIN: HCPCS | Performed by: NURSE PRACTITIONER

## 2025-08-05 PROCEDURE — 99214 OFFICE O/P EST MOD 30 MIN: CPT | Performed by: NURSE PRACTITIONER

## 2025-08-05 PROCEDURE — 2022F DILAT RTA XM EVC RTNOPTHY: CPT | Performed by: NURSE PRACTITIONER

## 2025-08-05 PROCEDURE — 4004F PT TOBACCO SCREEN RCVD TLK: CPT | Performed by: NURSE PRACTITIONER

## 2025-08-05 PROCEDURE — 3074F SYST BP LT 130 MM HG: CPT | Performed by: NURSE PRACTITIONER

## 2025-08-05 ASSESSMENT — ENCOUNTER SYMPTOMS
EYES NEGATIVE: 1
RESPIRATORY NEGATIVE: 1
ALLERGIC/IMMUNOLOGIC NEGATIVE: 1
NAUSEA: 1

## 2025-08-08 ENCOUNTER — HOSPITAL ENCOUNTER (OUTPATIENT)
Dept: LAB | Age: 55
Discharge: HOME | End: 2025-08-08
Payer: COMMERCIAL

## 2025-08-08 DIAGNOSIS — R53.83 OTHER FATIGUE: ICD-10-CM

## 2025-08-08 DIAGNOSIS — E11.9 TYPE 2 DIABETES MELLITUS WITHOUT COMPLICATION, UNSPECIFIED WHETHER LONG TERM INSULIN USE (HCC): ICD-10-CM

## 2025-08-08 LAB
25(OH)D3 SERPL-MCNC: 17.1 NG/ML (ref 30–100)
ALBUMIN SERPL-MCNC: 3.7 G/DL (ref 3.5–5.2)
ALBUMIN/GLOB SERPL: 1.1 {RATIO} (ref 1–2.5)
ALP SERPL-CCNC: 114 U/L (ref 35–104)
ALT SERPL-CCNC: 36 U/L (ref 10–35)
ANION GAP SERPL CALCULATED.3IONS-SCNC: 13 MMOL/L (ref 9–16)
AST SERPL-CCNC: 31 U/L (ref 10–35)
BASOPHILS # BLD: 0.1 K/UL (ref 0–0.2)
BASOPHILS NFR BLD: 1 % (ref 0–2)
BILIRUB SERPL-MCNC: 0.8 MG/DL (ref 0–1.2)
BUN SERPL-MCNC: 11 MG/DL (ref 6–20)
BUN/CREAT SERPL: 14 (ref 9–20)
CALCIUM SERPL-MCNC: 9.2 MG/DL (ref 8.6–10.4)
CHLORIDE SERPL-SCNC: 100 MMOL/L (ref 98–107)
CO2 SERPL-SCNC: 24 MMOL/L (ref 20–31)
CREAT SERPL-MCNC: 0.8 MG/DL (ref 0.5–0.9)
EOSINOPHIL # BLD: 0.13 K/UL (ref 0–0.44)
EOSINOPHILS RELATIVE PERCENT: 2 % (ref 1–4)
ERYTHROCYTE [DISTWIDTH] IN BLOOD BY AUTOMATED COUNT: 17.2 % (ref 11.8–14.4)
EST. AVERAGE GLUCOSE BLD GHB EST-MCNC: 126 MG/DL
FERRITIN SERPL-MCNC: 130 NG/ML (ref 15–150)
FOLATE SERPL-MCNC: 5.4 NG/ML (ref 4.8–24.2)
GFR, ESTIMATED: 82 ML/MIN/1.73M2
GLUCOSE P FAST SERPL-MCNC: 87 MG/DL (ref 74–99)
HBA1C MFR BLD: 6 % (ref 4–6)
HCT VFR BLD AUTO: 56.3 % (ref 36.3–47.1)
HGB BLD-MCNC: 19 G/DL (ref 11.9–15.1)
IMM GRANULOCYTES # BLD AUTO: 0.05 K/UL (ref 0–0.3)
IMM GRANULOCYTES NFR BLD: 1 %
IRON SATN MFR SERPL: 36 % (ref 20–55)
IRON SERPL-MCNC: 127 UG/DL (ref 37–145)
LYMPHOCYTES NFR BLD: 2.22 K/UL (ref 1.1–3.7)
LYMPHOCYTES RELATIVE PERCENT: 27 % (ref 24–43)
MCH RBC QN AUTO: 31 PG (ref 25.2–33.5)
MCHC RBC AUTO-ENTMCNC: 33.7 G/DL (ref 28.4–34.8)
MCV RBC AUTO: 92 FL (ref 82.6–102.9)
MONOCYTES NFR BLD: 0.81 K/UL (ref 0.1–1.2)
MONOCYTES NFR BLD: 10 % (ref 3–12)
NEUTROPHILS NFR BLD: 60 % (ref 36–65)
NEUTS SEG NFR BLD: 4.91 K/UL (ref 1.5–8.1)
NRBC BLD-RTO: 0 PER 100 WBC
PLATELET # BLD AUTO: 241 K/UL (ref 138–453)
PMV BLD AUTO: 10 FL (ref 8.1–13.5)
POTASSIUM SERPL-SCNC: 4.8 MMOL/L (ref 3.7–5.3)
PROT SERPL-MCNC: 7 G/DL (ref 6.6–8.7)
RBC # BLD AUTO: 6.12 M/UL (ref 3.95–5.11)
SODIUM SERPL-SCNC: 137 MMOL/L (ref 136–145)
TIBC SERPL-MCNC: 355 UG/DL (ref 250–450)
TSH SERPL DL<=0.05 MIU/L-ACNC: 2 UIU/ML (ref 0.27–4.2)
UNSATURATED IRON BINDING CAPACITY: 228 UG/DL (ref 112–347)
VIT B12 SERPL-MCNC: 745 PG/ML (ref 232–1245)
WBC OTHER # BLD: 8.2 K/UL (ref 3.5–11.3)

## 2025-08-08 PROCEDURE — 83550 IRON BINDING TEST: CPT

## 2025-08-08 PROCEDURE — 82607 VITAMIN B-12: CPT

## 2025-08-08 PROCEDURE — 82746 ASSAY OF FOLIC ACID SERUM: CPT

## 2025-08-08 PROCEDURE — 80053 COMPREHEN METABOLIC PANEL: CPT

## 2025-08-08 PROCEDURE — 36415 COLL VENOUS BLD VENIPUNCTURE: CPT

## 2025-08-08 PROCEDURE — 82306 VITAMIN D 25 HYDROXY: CPT

## 2025-08-08 PROCEDURE — 83036 HEMOGLOBIN GLYCOSYLATED A1C: CPT

## 2025-08-08 PROCEDURE — 83540 ASSAY OF IRON: CPT

## 2025-08-08 PROCEDURE — 85025 COMPLETE CBC W/AUTO DIFF WBC: CPT

## 2025-08-08 PROCEDURE — 84443 ASSAY THYROID STIM HORMONE: CPT

## 2025-08-08 PROCEDURE — 82728 ASSAY OF FERRITIN: CPT

## 2025-08-12 ENCOUNTER — OFFICE VISIT (OUTPATIENT)
Dept: CARDIOLOGY | Age: 55
End: 2025-08-12
Payer: COMMERCIAL

## 2025-08-12 VITALS
SYSTOLIC BLOOD PRESSURE: 115 MMHG | HEIGHT: 60 IN | WEIGHT: 256.8 LBS | BODY MASS INDEX: 50.42 KG/M2 | DIASTOLIC BLOOD PRESSURE: 75 MMHG | RESPIRATION RATE: 18 BRPM | HEART RATE: 78 BPM

## 2025-08-12 DIAGNOSIS — D75.1 ERYTHROCYTOSIS: ICD-10-CM

## 2025-08-12 DIAGNOSIS — R07.89 ATYPICAL CHEST PAIN: ICD-10-CM

## 2025-08-12 DIAGNOSIS — I10 PRIMARY HYPERTENSION: ICD-10-CM

## 2025-08-12 DIAGNOSIS — R53.83 OTHER FATIGUE: ICD-10-CM

## 2025-08-12 DIAGNOSIS — R06.02 SOB (SHORTNESS OF BREATH): Primary | ICD-10-CM

## 2025-08-12 DIAGNOSIS — R42 DIZZINESS: ICD-10-CM

## 2025-08-12 DIAGNOSIS — J43.9 PULMONARY EMPHYSEMA, UNSPECIFIED EMPHYSEMA TYPE (HCC): ICD-10-CM

## 2025-08-12 DIAGNOSIS — R00.2 PALPITATION: ICD-10-CM

## 2025-08-12 DIAGNOSIS — G47.33 OSA (OBSTRUCTIVE SLEEP APNEA): ICD-10-CM

## 2025-08-12 DIAGNOSIS — R73.03 PRE-DIABETES: ICD-10-CM

## 2025-08-12 DIAGNOSIS — Z71.6 TOBACCO ABUSE COUNSELING: ICD-10-CM

## 2025-08-12 PROCEDURE — 99214 OFFICE O/P EST MOD 30 MIN: CPT | Performed by: INTERNAL MEDICINE

## 2025-08-12 PROCEDURE — 3023F SPIROM DOC REV: CPT | Performed by: INTERNAL MEDICINE

## 2025-08-12 PROCEDURE — 3078F DIAST BP <80 MM HG: CPT | Performed by: INTERNAL MEDICINE

## 2025-08-12 PROCEDURE — 3074F SYST BP LT 130 MM HG: CPT | Performed by: INTERNAL MEDICINE

## 2025-08-12 PROCEDURE — 3017F COLORECTAL CA SCREEN DOC REV: CPT | Performed by: INTERNAL MEDICINE

## 2025-08-12 PROCEDURE — 4004F PT TOBACCO SCREEN RCVD TLK: CPT | Performed by: INTERNAL MEDICINE

## 2025-08-12 PROCEDURE — 93000 ELECTROCARDIOGRAM COMPLETE: CPT | Performed by: INTERNAL MEDICINE

## 2025-08-12 PROCEDURE — G8417 CALC BMI ABV UP PARAM F/U: HCPCS | Performed by: INTERNAL MEDICINE

## 2025-08-12 PROCEDURE — G8427 DOCREV CUR MEDS BY ELIG CLIN: HCPCS | Performed by: INTERNAL MEDICINE

## 2025-08-12 RX ORDER — DILTIAZEM HYDROCHLORIDE 180 MG/1
180 CAPSULE, COATED, EXTENDED RELEASE ORAL DAILY
Qty: 90 CAPSULE | Refills: 3 | Status: SHIPPED | OUTPATIENT
Start: 2025-08-12

## 2025-08-12 RX ORDER — QUETIAPINE FUMARATE 25 MG/1
25 TABLET, FILM COATED ORAL DAILY
COMMUNITY
Start: 2025-08-10

## 2025-08-12 RX ORDER — BUPROPION HYDROCHLORIDE 150 MG/1
150 TABLET ORAL EVERY MORNING
COMMUNITY
Start: 2025-08-10

## 2025-08-13 ENCOUNTER — RESULTS FOLLOW-UP (OUTPATIENT)
Dept: PRIMARY CARE CLINIC | Age: 55
End: 2025-08-13

## 2025-08-13 DIAGNOSIS — E55.9 VITAMIN D DEFICIENCY: Primary | ICD-10-CM

## 2025-08-13 RX ORDER — ERGOCALCIFEROL 1.25 MG/1
50000 CAPSULE, LIQUID FILLED ORAL WEEKLY
Qty: 12 CAPSULE | Refills: 1 | Status: SHIPPED | OUTPATIENT
Start: 2025-08-13

## 2025-08-20 ENCOUNTER — OFFICE VISIT (OUTPATIENT)
Dept: ONCOLOGY | Age: 55
End: 2025-08-20
Payer: COMMERCIAL

## 2025-08-20 VITALS
SYSTOLIC BLOOD PRESSURE: 155 MMHG | DIASTOLIC BLOOD PRESSURE: 81 MMHG | BODY MASS INDEX: 50.97 KG/M2 | HEART RATE: 78 BPM | RESPIRATION RATE: 18 BRPM | TEMPERATURE: 97.6 F | WEIGHT: 261 LBS

## 2025-08-20 DIAGNOSIS — G47.33 OSA (OBSTRUCTIVE SLEEP APNEA): Primary | ICD-10-CM

## 2025-08-20 DIAGNOSIS — D75.1 POLYCYTHEMIA: ICD-10-CM

## 2025-08-20 PROCEDURE — G8427 DOCREV CUR MEDS BY ELIG CLIN: HCPCS | Performed by: INTERNAL MEDICINE

## 2025-08-20 PROCEDURE — 99245 OFF/OP CONSLTJ NEW/EST HI 55: CPT | Performed by: INTERNAL MEDICINE

## 2025-08-20 PROCEDURE — 3077F SYST BP >= 140 MM HG: CPT | Performed by: INTERNAL MEDICINE

## 2025-08-20 PROCEDURE — 3079F DIAST BP 80-89 MM HG: CPT | Performed by: INTERNAL MEDICINE

## 2025-08-20 PROCEDURE — G8417 CALC BMI ABV UP PARAM F/U: HCPCS | Performed by: INTERNAL MEDICINE

## 2025-08-20 RX ORDER — 0.9 % SODIUM CHLORIDE 0.9 %
250 INTRAVENOUS SOLUTION INTRAVENOUS ONCE
Status: CANCELLED | OUTPATIENT
Start: 2025-08-22 | End: 2025-08-22

## 2025-08-21 ENCOUNTER — HOSPITAL ENCOUNTER (OUTPATIENT)
Dept: LAB | Age: 55
Discharge: HOME | End: 2025-08-21
Payer: COMMERCIAL

## 2025-08-21 DIAGNOSIS — R07.89 ATYPICAL CHEST PAIN: ICD-10-CM

## 2025-08-21 DIAGNOSIS — D75.1 POLYCYTHEMIA: ICD-10-CM

## 2025-08-21 DIAGNOSIS — R53.83 OTHER FATIGUE: ICD-10-CM

## 2025-08-21 DIAGNOSIS — G47.33 OSA (OBSTRUCTIVE SLEEP APNEA): ICD-10-CM

## 2025-08-21 DIAGNOSIS — J43.9 PULMONARY EMPHYSEMA, UNSPECIFIED EMPHYSEMA TYPE (HCC): ICD-10-CM

## 2025-08-21 DIAGNOSIS — I10 PRIMARY HYPERTENSION: ICD-10-CM

## 2025-08-21 DIAGNOSIS — R06.02 SOB (SHORTNESS OF BREATH): ICD-10-CM

## 2025-08-21 DIAGNOSIS — R42 DIZZINESS: ICD-10-CM

## 2025-08-21 DIAGNOSIS — R73.03 PRE-DIABETES: ICD-10-CM

## 2025-08-21 DIAGNOSIS — R00.2 PALPITATION: ICD-10-CM

## 2025-08-21 DIAGNOSIS — Z71.6 TOBACCO ABUSE COUNSELING: ICD-10-CM

## 2025-08-21 LAB
BASOPHILS # BLD: 0.1 K/UL (ref 0–0.2)
BASOPHILS NFR BLD: 1 % (ref 0–2)
CHOLEST SERPL-MCNC: 195 MG/DL (ref 0–199)
CHOLESTEROL/HDL RATIO: 3.1
EOSINOPHIL # BLD: 0.2 K/UL (ref 0–0.44)
EOSINOPHILS RELATIVE PERCENT: 2 % (ref 1–4)
ERYTHROCYTE [DISTWIDTH] IN BLOOD BY AUTOMATED COUNT: 17.6 % (ref 11.8–14.4)
FERRITIN SERPL-MCNC: 113 NG/ML (ref 15–150)
HCT VFR BLD AUTO: 58.3 % (ref 36.3–47.1)
HDLC SERPL-MCNC: 62 MG/DL
HGB BLD-MCNC: 19.7 G/DL (ref 11.9–15.1)
IMM GRANULOCYTES # BLD AUTO: 0.08 K/UL (ref 0–0.3)
IMM GRANULOCYTES NFR BLD: 1 %
LDLC SERPL CALC-MCNC: 107 MG/DL (ref 0–100)
LYMPHOCYTES NFR BLD: 2.97 K/UL (ref 1.1–3.7)
LYMPHOCYTES RELATIVE PERCENT: 30 % (ref 24–43)
MCH RBC QN AUTO: 31.3 PG (ref 25.2–33.5)
MCHC RBC AUTO-ENTMCNC: 33.8 G/DL (ref 28.4–34.8)
MCV RBC AUTO: 92.7 FL (ref 82.6–102.9)
MONOCYTES NFR BLD: 0.92 K/UL (ref 0.1–1.2)
MONOCYTES NFR BLD: 9 % (ref 3–12)
NEUTROPHILS NFR BLD: 56 % (ref 36–65)
NEUTS SEG NFR BLD: 5.5 K/UL (ref 1.5–8.1)
NRBC BLD-RTO: 0 PER 100 WBC
PLATELET # BLD AUTO: 264 K/UL (ref 138–453)
PMV BLD AUTO: 10.7 FL (ref 8.1–13.5)
RBC # BLD AUTO: 6.29 M/UL (ref 3.95–5.11)
TRIGL SERPL-MCNC: 132 MG/DL
VLDLC SERPL CALC-MCNC: 26 MG/DL (ref 1–30)
WBC OTHER # BLD: 9.8 K/UL (ref 3.5–11.3)

## 2025-08-21 PROCEDURE — 80061 LIPID PANEL: CPT

## 2025-08-21 PROCEDURE — 82728 ASSAY OF FERRITIN: CPT

## 2025-08-21 PROCEDURE — 36415 COLL VENOUS BLD VENIPUNCTURE: CPT

## 2025-08-21 PROCEDURE — 85025 COMPLETE CBC W/AUTO DIFF WBC: CPT

## 2025-08-22 ENCOUNTER — HOSPITAL ENCOUNTER (OUTPATIENT)
Dept: INFUSION THERAPY | Age: 55
Discharge: HOME | End: 2025-08-22
Payer: COMMERCIAL

## 2025-08-22 VITALS
RESPIRATION RATE: 18 BRPM | TEMPERATURE: 97.4 F | DIASTOLIC BLOOD PRESSURE: 85 MMHG | HEART RATE: 70 BPM | SYSTOLIC BLOOD PRESSURE: 134 MMHG

## 2025-08-22 DIAGNOSIS — D75.1 POLYCYTHEMIA: Primary | ICD-10-CM

## 2025-08-22 PROCEDURE — 99195 PHLEBOTOMY: CPT

## 2025-08-22 RX ORDER — ARIPIPRAZOLE 20 MG/1
20 TABLET ORAL DAILY
COMMUNITY
End: 2025-08-25

## 2025-08-22 RX ORDER — 0.9 % SODIUM CHLORIDE 0.9 %
250 INTRAVENOUS SOLUTION INTRAVENOUS ONCE
OUTPATIENT
Start: 2025-09-18 | End: 2025-09-18

## 2025-08-22 ASSESSMENT — PAIN DESCRIPTION - DESCRIPTORS: DESCRIPTORS: THROBBING

## 2025-08-22 ASSESSMENT — PAIN DESCRIPTION - LOCATION: LOCATION: SHOULDER

## 2025-08-22 ASSESSMENT — PAIN DESCRIPTION - ONSET: ONSET: ON-GOING

## 2025-08-22 ASSESSMENT — PAIN SCALES - GENERAL: PAINLEVEL_OUTOF10: 6

## 2025-08-22 ASSESSMENT — PAIN - FUNCTIONAL ASSESSMENT: PAIN_FUNCTIONAL_ASSESSMENT: PREVENTS OR INTERFERES WITH ALL ACTIVE AND SOME PASSIVE ACTIVITIES

## 2025-08-22 ASSESSMENT — PAIN DESCRIPTION - ORIENTATION: ORIENTATION: LEFT

## 2025-08-22 ASSESSMENT — PAIN DESCRIPTION - FREQUENCY: FREQUENCY: CONTINUOUS

## 2025-08-22 ASSESSMENT — PAIN DESCRIPTION - PAIN TYPE: TYPE: CHRONIC PAIN

## 2025-08-25 ENCOUNTER — RESULTS FOLLOW-UP (OUTPATIENT)
Dept: CARDIOLOGY | Age: 55
End: 2025-08-25

## 2025-08-25 ENCOUNTER — OFFICE VISIT (OUTPATIENT)
Dept: PULMONOLOGY | Age: 55
End: 2025-08-25

## 2025-08-25 VITALS
DIASTOLIC BLOOD PRESSURE: 70 MMHG | HEART RATE: 80 BPM | SYSTOLIC BLOOD PRESSURE: 133 MMHG | WEIGHT: 262.4 LBS | OXYGEN SATURATION: 92 % | BODY MASS INDEX: 51.51 KG/M2 | RESPIRATION RATE: 18 BRPM | TEMPERATURE: 97.5 F | HEIGHT: 60 IN

## 2025-08-25 DIAGNOSIS — J41.8 MIXED SIMPLE AND MUCOPURULENT CHRONIC BRONCHITIS (HCC): ICD-10-CM

## 2025-08-25 DIAGNOSIS — D75.1 POLYCYTHEMIA: ICD-10-CM

## 2025-08-25 DIAGNOSIS — E66.01 MORBID OBESITY WITH BMI OF 50.0-59.9, ADULT (HCC): ICD-10-CM

## 2025-08-25 DIAGNOSIS — G47.33 OSA ON CPAP: Primary | ICD-10-CM

## 2025-08-25 DIAGNOSIS — Z87.891 PERSONAL HISTORY OF TOBACCO USE: ICD-10-CM

## 2025-08-25 RX ORDER — QUETIAPINE FUMARATE 100 MG/1
100 TABLET, FILM COATED ORAL NIGHTLY
COMMUNITY

## 2025-08-25 RX ORDER — BUPROPION HYDROCHLORIDE 200 MG/1
200 TABLET, EXTENDED RELEASE ORAL DAILY
COMMUNITY

## 2025-08-25 RX ORDER — ARIPIPRAZOLE 10 MG/1
10 TABLET ORAL DAILY
COMMUNITY

## 2025-08-25 RX ORDER — NICOTINE 21 MG/24HR
1 PATCH, TRANSDERMAL 24 HOURS TRANSDERMAL DAILY
Qty: 30 PATCH | Refills: 0 | Status: SHIPPED | OUTPATIENT
Start: 2025-08-25

## 2025-08-28 ENCOUNTER — HOSPITAL ENCOUNTER (OUTPATIENT)
Dept: LAB | Age: 55
Discharge: HOME | End: 2025-08-28
Payer: COMMERCIAL

## 2025-08-28 DIAGNOSIS — D75.1 POLYCYTHEMIA: ICD-10-CM

## 2025-08-28 DIAGNOSIS — G47.33 OSA (OBSTRUCTIVE SLEEP APNEA): ICD-10-CM

## 2025-08-28 LAB — COHGB MFR BLD: 16.7 % (ref 0–5)

## 2025-08-28 PROCEDURE — 82375 ASSAY CARBOXYHB QUANT: CPT

## 2025-08-28 PROCEDURE — 36415 COLL VENOUS BLD VENIPUNCTURE: CPT

## 2025-08-28 PROCEDURE — 82668 ASSAY OF ERYTHROPOIETIN: CPT

## 2025-08-28 PROCEDURE — 81270 JAK2 GENE: CPT

## 2025-08-30 LAB — EPO SERPL-ACNC: 30 MU/ML (ref 4–27)

## 2025-09-04 LAB
SPECIMEN SOURCE: NORMAL
V617 MUTATION, PERCENT: 0 %
V617F MUTATION, QNT: NOT DETECTED

## (undated) DEVICE — RADIFOCUS OPTITORQUE ANGIOGRAPHIC CATHETER: Brand: OPTITORQUE

## (undated) DEVICE — GLOVE ORANGE PI 7 1/2   MSG9075

## (undated) DEVICE — MERCY TIFFIN CATH LAB PACK: Brand: MEDLINE INDUSTRIES, INC.

## (undated) DEVICE — DRAPE, RADIAL, STERILE: Brand: MEDLINE

## (undated) DEVICE — BENTSON WIRE GUIDE 20CM DISTAL FLEXIBILITY WITH SOFTENED TIP: Brand: BENTSON

## (undated) DEVICE — CATHETER ANGIO 6FR L110CM ID0.056IN VENT PIG CRV ROBUST

## (undated) DEVICE — GLIDESHEATH NITINOL HYDROPHILIC COATED INTRODUCER SHEATH: Brand: GLIDESHEATH